# Patient Record
Sex: FEMALE | Race: WHITE | HISPANIC OR LATINO | URBAN - METROPOLITAN AREA
[De-identification: names, ages, dates, MRNs, and addresses within clinical notes are randomized per-mention and may not be internally consistent; named-entity substitution may affect disease eponyms.]

---

## 2021-08-11 ENCOUNTER — EMERGENCY (EMERGENCY)
Facility: HOSPITAL | Age: 21
LOS: 1 days | Discharge: HOME | End: 2021-08-11
Attending: EMERGENCY MEDICINE
Payer: MEDICAID

## 2021-08-11 VITALS
WEIGHT: 175.93 LBS | DIASTOLIC BLOOD PRESSURE: 87 MMHG | OXYGEN SATURATION: 97 % | HEART RATE: 94 BPM | HEIGHT: 65 IN | SYSTOLIC BLOOD PRESSURE: 142 MMHG | RESPIRATION RATE: 18 BRPM | TEMPERATURE: 99 F

## 2021-08-11 PROCEDURE — 99284 EMERGENCY DEPT VISIT MOD MDM: CPT

## 2021-08-12 LAB
HPIV3 RNA SPEC QL NAA+PROBE: DETECTED
RAPID RVP RESULT: DETECTED
SARS-COV-2 RNA SPEC QL NAA+PROBE: SIGNIFICANT CHANGE UP

## 2021-08-12 PROCEDURE — 94640 AIRWAY INHALATION TREATMENT: CPT

## 2021-08-12 PROCEDURE — 99283 EMERGENCY DEPT VISIT LOW MDM: CPT

## 2021-08-12 PROCEDURE — 0225U NFCT DS DNA&RNA 21 SARSCOV2: CPT

## 2021-08-12 RX ORDER — ALBUTEROL 90 UG/1
2 AEROSOL, METERED ORAL EVERY 6 HOURS
Refills: 0 | Status: DISCONTINUED | OUTPATIENT
Start: 2021-08-12 | End: 2021-08-16

## 2021-08-12 RX ORDER — IBUPROFEN 200 MG
600 TABLET ORAL ONCE
Refills: 0 | Status: COMPLETED | OUTPATIENT
Start: 2021-08-12 | End: 2021-08-12

## 2021-08-12 RX ORDER — FLUTICASONE PROPIONATE 50 MCG
1 SPRAY, SUSPENSION NASAL
Refills: 0 | Status: DISCONTINUED | OUTPATIENT
Start: 2021-08-12 | End: 2021-08-16

## 2021-08-12 RX ORDER — ACETAMINOPHEN 500 MG
650 TABLET ORAL ONCE
Refills: 0 | Status: COMPLETED | OUTPATIENT
Start: 2021-08-12 | End: 2021-08-12

## 2021-08-12 RX ADMIN — ALBUTEROL 2 PUFF(S): 90 AEROSOL, METERED ORAL at 00:53

## 2021-08-12 RX ADMIN — Medication 1 SPRAY(S): at 00:52

## 2021-08-12 RX ADMIN — Medication 650 MILLIGRAM(S): at 00:52

## 2021-08-12 RX ADMIN — Medication 600 MILLIGRAM(S): at 00:52

## 2021-08-12 NOTE — ED PROVIDER NOTE - PROGRESS NOTE DETAILS
CHANDRA Cannon NOTE: Pt feeling better, headache resolved, advised self quarantine until results available of COVID test. Pt stable for d/c, reports improvement, VSS, tolerating PO, ambulatory.  Discussion includes results, plan, and return precautions. Pt advised to f/u with PMD 1-2 days. Pt verbalized understanding/agreement of plan.

## 2021-08-12 NOTE — ED PROVIDER NOTE - OBJECTIVE STATEMENT
22 y/o F with no PMHx presents to ED reporting runny nose, sneezing, mild frontal headache, dry cough, subjective fever and chills over past 3 days. No known exposure to COVID. Took no analgesics prior to arrival. Denies pregnancy.

## 2021-08-12 NOTE — ED PROVIDER NOTE - NSFOLLOWUPINSTRUCTIONS_ED_ALL_ED_FT
READ ALL ATTACHED INSTRUCTIONS FOR CORONAVIRUS IMMEDIATELY   - You were tested for COVID-19 (Coronavirus) during your visit to Emergency Department.   - Do not return to work/school/public areas/public transit until you have tested negative for COVID-19.  - Results will be available in 1-2 days. Self quarantine until COVID-19 results available.  - Monitor your symptoms using symptom tracker.  - Practice social distancing and avoid contact with others. Avoid sharing personal household items.   - Practice proper hand hygiene. Disinfect surfaces frequently. Cover your coughs and sneezes.   - Stay hydrated.    - Your blood pressure reading was high while in ED, please monitor your blood pressure at home and follow up with PMD.     SEEK IMMEDIATE MEDICAL CARE IF YOU HAVE ANY OF THE FOLLOWING SYMPTOMS  **Seek immediate medicate attention if you develop new/worsening, signs/symptoms or concerns including, but not limited to shortness of breath, difficulty breathing, chest pain, confusion, severe weakness.**    If you believe you are experiencing a medical emergency call 9-1-1.       Viral Syndrome    WHAT YOU NEED TO KNOW:    Viral syndrome is a term used for symptoms of an infection caused by a virus. Viruses are spread easily from person to person through the air and on shared items.    DISCHARGE INSTRUCTIONS:    Call your local emergency number (911 in the [place]) or have someone else call if:   •You have a seizure.      •You cannot be woken.      •You have chest pain or trouble breathing.      Return to the emergency department if:   •You have a stiff neck, a bad headache, and sensitivity to light.      •You feel weak, dizzy, or confused.      •You stop urinating or urinate a lot less than usual.      •You cough up blood or thick yellow or green mucus.      •You have severe abdominal pain or your abdomen is larger than usual.      Call your doctor if:   •Your symptoms do not get better with treatment or get worse after 3 days.      •You have a rash or ear pain.      •You have burning when you urinate.      •You have questions or concerns about your condition or care.      Medicines: You may need any of the following:   •Acetaminophen decreases pain and fever. It is available without a doctor's order. Ask how much to take and how often to take it. Follow directions. Read the labels of all other medicines you are using to see if they also contain acetaminophen, or ask your doctor or pharmacist. Acetaminophen can cause liver damage if not taken correctly. Do not use more than 4 grams (4,000 milligrams) total of acetaminophen in one day.       •NSAIDs, such as ibuprofen, help decrease swelling, pain, and fever. NSAIDs can cause stomach bleeding or kidney problems in certain people. If you take blood thinner medicine, always ask your healthcare provider if NSAIDs are safe for you. Always read the medicine label and follow directions.      •Cold medicine helps decrease swelling, control a cough, and relieve chest or nasal congestion.       •Saline nasal spray helps decrease nasal congestion.       •Take your medicine as directed. Contact your healthcare provider if you think your medicine is not helping or if you have side effects. Tell him of her if you are allergic to any medicine. Keep a list of the medicines, vitamins, and herbs you take. Include the amounts, and when and why you take them. Bring the list or the pill bottles to follow-up visits. Carry your medicine list with you in case of an emergency.      Manage your symptoms:   •Drink liquids as directed to prevent dehydration. Ask how much liquid to drink each day and which liquids are best for you. Ask if you should drink an oral rehydration solution (ORS). An ORS has the right amounts of water, salts, and sugar you need to replace body fluids. This may help prevent dehydration caused by vomiting or diarrhea. Do not drink liquids with caffeine. Liquids with caffeine can make dehydration worse.      •Get plenty of rest to help your body heal. Take naps throughout the day. Ask your healthcare provider when you can return to work and your normal activities.      •Use a cool mist humidifier to help you breathe easier. Ask your healthcare provider how to use a cool mist humidifier.      •Eat honey or use cough drops for a sore throat. Cough drops are available without a doctor's order. Follow directions for taking cough drops.      •Do not smoke or be close to anyone who is smoking. Nicotine and other chemicals in cigarettes and cigars can cause lung damage. Smoking can also delay healing. Ask your healthcare provider for information if you currently smoke and need help to quit. E-cigarettes or smokeless tobacco still contain nicotine. Talk to your healthcare provider before you use these products.      Prevent the spread of germs:          •Wash your hands often. Wash your hands several times each day. Wash after you use the bathroom, change a child's diaper, and before you prepare or eat food. Use soap and water every time. Rub your soapy hands together, lacing your fingers. Wash the front and back of your hands, and in between your fingers. Use the fingers of one hand to scrub under the fingernails of the other hand. Wash for at least 20 seconds. Rinse with warm, running water for several seconds. Then dry your hands with a clean towel or paper towel. Use hand  that contains alcohol if soap and water are not available. Do not touch your eyes, nose, or mouth without washing your hands first.  Handwashing           •Cover a sneeze or cough. Use a tissue that covers your mouth and nose. Throw the tissue away in a trash can right away. Use the bend of your arm if a tissue is not available. Wash your hands well with soap and water or use a hand .      •Stay away from others while you are sick. Avoid crowds as much as possible.      •Ask about vaccines you may need. Talk to your healthcare provider about your vaccine history. He or she will tell you which vaccines you need, and when to get them.?Get the influenza (flu) vaccine as soon as recommended each year. The flu vaccine is available starting in September or October. Flu viruses change, so it is important to get a flu vaccine every year.      ?Get the pneumonia vaccine if recommended. This vaccine is usually recommended every 5 years. Your provider will tell you when to get this vaccine, if needed.        Follow up with your doctor as directed: Write down your questions so you remember to ask them during your visits.

## 2021-08-12 NOTE — ED PROVIDER NOTE - ATTENDING CONTRIBUTION TO CARE
Britta: I performed a face to face bedside interview with patient regarding history of present illness, review of symptoms and past medical history. I completed an independent physical exam.  I have discussed patient's plan of care with advanced care provider.   I agree with note as stated above including HISTORY OF PRESENT ILLNESS, HIV, PAST MEDICAL/SURGICAL/FAMILY/SOCIAL HISTORY, ALLERGIES AND HOME MEDICATIONS, REVIEW OF SYSTEMS, PHYSICAL EXAM, MEDICAL DECISION MAKING and any PROGRESS NOTES during the time I functioned as the attending physician for this patient  unless otherwise noted. My brief assessment is as follows: 21F no PMH p/w nasal congestion, mild frontal headache, dry cough, subjective fever and chills over past 3 days. No known exposure to COVID. Plan for symptom control, RVP/covid swab, reassess

## 2021-08-12 NOTE — ED PROVIDER NOTE - PATIENT PORTAL LINK FT
You can access the FollowMyHealth Patient Portal offered by [facility] by registering at the following website: [email or web address]. By joining [facility]’s FollowMyHealth portal, you will also be able to view your health information using other applications (apps) compatible with our system.

## 2021-08-12 NOTE — ED PROVIDER NOTE - CLINICAL SUMMARY MEDICAL DECISION MAKING FREE TEXT BOX
20 y/o F with no PMHx presents to ED reporting runny nose, sneezing, mild frontal headache, dry cough, subjective fever and chills over past 3 days. No known exposure to COVID.   -Will give antipyretic, flonase, albuterol inhaler and check COVID testing

## 2021-08-12 NOTE — ED PROVIDER NOTE - PHYSICAL EXAMINATION
Vital signs noted, see flowsheet.  General: NAD, well appearing and non-toxic.  HEENT: NC/AT. MMM. + Nasal discharge, throat clear.  Conjunctiva and sclera clear b/l.  EOMI. PERRL.  Neck: Soft and supple, full ROM without pain.  Cardiac: RRR. +S1/S2. Peripheral pulses 2+ and symmetric b/l. No LE edema.  Respiratory: Speaking in full sentences, no evidence of respiratory distress. Lungs CTA b/l, no wheezes/rhonchi/rales/stridor.   Abdomen: Normoactive bowel sounds x 4 quadrants. Soft, NTND. No guarding or rebound tenderness. No suprapubic tenderness.  Back: Spine midline and non-tender. No CVAT.  Skin: Normal color for race, no evidence of rash, ecchymosis, cyanosis or jaundice.   Neuro: Awake, alert and oriented to person/place/time/situation. Moves all extremities spontaneously and symmetrically.  No focal deficits  Psych: Normal affect

## 2021-10-11 ENCOUNTER — APPOINTMENT (OUTPATIENT)
Dept: OBGYN | Facility: CLINIC | Age: 21
End: 2021-10-11
Payer: MEDICAID

## 2021-10-11 ENCOUNTER — RESULT CHARGE (OUTPATIENT)
Age: 21
End: 2021-10-11

## 2021-10-11 VITALS
DIASTOLIC BLOOD PRESSURE: 70 MMHG | WEIGHT: 177 LBS | BODY MASS INDEX: 29.49 KG/M2 | SYSTOLIC BLOOD PRESSURE: 134 MMHG | HEIGHT: 65 IN

## 2021-10-11 DIAGNOSIS — Z78.9 OTHER SPECIFIED HEALTH STATUS: ICD-10-CM

## 2021-10-11 DIAGNOSIS — R87.619 UNSPECIFIED ABNORMAL CYTOLOGICAL FINDINGS IN SPECIMENS FROM CERVIX UTERI: ICD-10-CM

## 2021-10-11 DIAGNOSIS — Z80.42 FAMILY HISTORY OF MALIGNANT NEOPLASM OF PROSTATE: ICD-10-CM

## 2021-10-11 DIAGNOSIS — Z83.3 FAMILY HISTORY OF DIABETES MELLITUS: ICD-10-CM

## 2021-10-11 PROBLEM — Z00.00 ENCOUNTER FOR PREVENTIVE HEALTH EXAMINATION: Status: ACTIVE | Noted: 2021-10-11

## 2021-10-11 LAB
HCG UR QL: NEGATIVE
QUALITY CONTROL: YES

## 2021-10-11 PROCEDURE — 81025 URINE PREGNANCY TEST: CPT

## 2021-10-11 PROCEDURE — 99202 OFFICE O/P NEW SF 15 MIN: CPT

## 2021-10-11 NOTE — HISTORY OF PRESENT ILLNESS
[N] : Patient denies prior pregnancies [Currently Active] : currently active [Men] : men [Y] : Patient uses contraception [Oral Contraceptive] : uses oral contraception pills [Menarche Age: ____] : age at menarche was [unfilled] [Yes] : Yes [TextBox_4] : New pt presents for a f/u of abnormal pap done at PCP [PapSmeardate] : 04/24/2021 [TextBox_31] : LSIL [LMPDate] : 09/27/2021 [FreeTextEntry1] : pain with sex [FreeTextEntry3] : OCP

## 2021-10-11 NOTE — DISCUSSION/SUMMARY
[FreeTextEntry1] : \par Reviewed patient pap results with her.  Pap results LSIL. As per ASCCP guidelines, recommendation for pap to be repeated in one year. She needs to return in 6 months for her gyn annual and pap follow-up. \par \par Patient appreciative, concerns and questions addressed. Patient verbalized understanding.\par \par RTO in 6 months for gyn annual and prn.

## 2021-10-25 ENCOUNTER — TRANSCRIPTION ENCOUNTER (OUTPATIENT)
Age: 21
End: 2021-10-25

## 2022-04-27 ENCOUNTER — APPOINTMENT (OUTPATIENT)
Dept: OBGYN | Facility: CLINIC | Age: 22
End: 2022-04-27

## 2022-05-02 ENCOUNTER — APPOINTMENT (OUTPATIENT)
Dept: OBGYN | Facility: CLINIC | Age: 22
End: 2022-05-02
Payer: MEDICAID

## 2022-05-02 VITALS
DIASTOLIC BLOOD PRESSURE: 72 MMHG | WEIGHT: 180.8 LBS | HEIGHT: 65 IN | BODY MASS INDEX: 30.12 KG/M2 | SYSTOLIC BLOOD PRESSURE: 122 MMHG

## 2022-05-02 DIAGNOSIS — Z01.419 ENCOUNTER FOR GYNECOLOGICAL EXAMINATION (GENERAL) (ROUTINE) W/OUT ABNORMAL FINDINGS: ICD-10-CM

## 2022-05-02 PROCEDURE — 99395 PREV VISIT EST AGE 18-39: CPT

## 2022-05-02 NOTE — PHYSICAL EXAM
[Chaperone Present] : A chaperone was present in the examining room during all aspects of the physical examination [Appropriately responsive] : appropriately responsive [Alert] : alert [No Acute Distress] : no acute distress [No Lymphadenopathy] : no lymphadenopathy [Soft] : soft [Non-tender] : non-tender [Non-distended] : non-distended [Oriented x3] : oriented x3 [Examination Of The Breasts] : a normal appearance [No Discharge] : no discharge [No Masses] : no breast masses were palpable [Labia Majora] : normal [Labia Minora] : normal [No Bleeding] : There was no active vaginal bleeding [Normal] : normal [Uterine Adnexae] : non-palpable [FreeTextEntry1] : amy

## 2022-05-02 NOTE — HISTORY OF PRESENT ILLNESS
[Oral Contraceptive] : uses oral contraception pills [Y] : Patient is sexually active [N] : Patient denies prior pregnancies [No] : Patient does not have concerns regarding sex [Currently Active] : currently active [Patient refuses STI testing] : Patient refuses STI testing [LMPDate] : 04/05/22 [PGHxTotal] : 0 [FreeTextEntry1] : 04/05/22

## 2022-05-02 NOTE — DISCUSSION/SUMMARY
[FreeTextEntry1] :   The benefits of adequate calcium intake and a daily multivitamin along with routine daily cardiovascular exercise were reviewed with the patient.\par   The patient was recommended to have pelvic sono for complaints of pain. \par   The importance of safer-sex was discussed with the patient.\par We reviewed ASCCP/ACOG guidelines for pap smears.

## 2022-05-03 LAB
C TRACH RRNA SPEC QL NAA+PROBE: NOT DETECTED
N GONORRHOEA RRNA SPEC QL NAA+PROBE: NOT DETECTED
SOURCE TP AMPLIFICATION: NORMAL

## 2022-05-09 ENCOUNTER — NON-APPOINTMENT (OUTPATIENT)
Age: 22
End: 2022-05-09

## 2022-05-12 LAB — CYTOLOGY CVX/VAG DOC THIN PREP: ABNORMAL

## 2022-06-07 ENCOUNTER — APPOINTMENT (OUTPATIENT)
Dept: ANTEPARTUM | Facility: CLINIC | Age: 22
End: 2022-06-07
Payer: MEDICAID

## 2022-06-07 ENCOUNTER — ASOB RESULT (OUTPATIENT)
Age: 22
End: 2022-06-07

## 2022-06-07 ENCOUNTER — APPOINTMENT (OUTPATIENT)
Dept: OBGYN | Facility: CLINIC | Age: 22
End: 2022-06-07
Payer: MEDICAID

## 2022-06-07 VITALS
BODY MASS INDEX: 29.99 KG/M2 | HEIGHT: 65 IN | SYSTOLIC BLOOD PRESSURE: 128 MMHG | WEIGHT: 180 LBS | DIASTOLIC BLOOD PRESSURE: 84 MMHG

## 2022-06-07 DIAGNOSIS — R10.2 PELVIC AND PERINEAL PAIN: ICD-10-CM

## 2022-06-07 PROCEDURE — 99213 OFFICE O/P EST LOW 20 MIN: CPT

## 2022-06-07 PROCEDURE — 76830 TRANSVAGINAL US NON-OB: CPT

## 2022-06-09 PROBLEM — R10.2 PELVIC PAIN IN FEMALE: Status: ACTIVE | Noted: 2022-05-02

## 2022-06-09 NOTE — HISTORY OF PRESENT ILLNESS
[FreeTextEntry1] : Rosalia presents for follow of pelvic pain, which she reported at her annual exam. She has been experiencing intermittent pelvic pain, dull, bilateral, for over 1 year. She also reports that she suffers from constipation. \par Sonogram today is showing 3.5cm uterus with 3mm lining, no ovarian or adnexal masses. \par

## 2022-06-09 NOTE — DISCUSSION/SUMMARY
[FreeTextEntry1] : We reviewed normal pelvic sonogram. I explained that pelvic pain can be caused by GYN, urinary, MSK, or GI origins. Given the fact that she also suffers from constipation, I advised avoidance of constipation to see if this will help with her symptoms. Daily probiotics recommended. I also advised use of miralax twice weekly as needed. \par RTO for annual exam or as needed.

## 2022-06-28 ENCOUNTER — NON-APPOINTMENT (OUTPATIENT)
Age: 22
End: 2022-06-28

## 2022-06-28 ENCOUNTER — EMERGENCY (EMERGENCY)
Facility: HOSPITAL | Age: 22
LOS: 1 days | Discharge: HOME | End: 2022-06-28
Attending: EMERGENCY MEDICINE
Payer: MEDICAID

## 2022-06-28 VITALS
DIASTOLIC BLOOD PRESSURE: 98 MMHG | WEIGHT: 179.9 LBS | OXYGEN SATURATION: 98 % | HEART RATE: 112 BPM | TEMPERATURE: 99 F | HEIGHT: 65 IN | SYSTOLIC BLOOD PRESSURE: 155 MMHG | RESPIRATION RATE: 20 BRPM

## 2022-06-28 VITALS — SYSTOLIC BLOOD PRESSURE: 154 MMHG | OXYGEN SATURATION: 98 % | HEART RATE: 96 BPM | DIASTOLIC BLOOD PRESSURE: 96 MMHG

## 2022-06-28 LAB
ALBUMIN SERPL ELPH-MCNC: 4 G/DL — SIGNIFICANT CHANGE UP (ref 3.3–5.2)
ALP SERPL-CCNC: 120 U/L — SIGNIFICANT CHANGE UP (ref 40–120)
ALT FLD-CCNC: 24 U/L — SIGNIFICANT CHANGE UP
ANION GAP SERPL CALC-SCNC: 13 MMOL/L — SIGNIFICANT CHANGE UP (ref 5–17)
AST SERPL-CCNC: 29 U/L — SIGNIFICANT CHANGE UP
BASOPHILS # BLD AUTO: 0.04 K/UL — SIGNIFICANT CHANGE UP (ref 0–0.2)
BASOPHILS NFR BLD AUTO: 0.5 % — SIGNIFICANT CHANGE UP (ref 0–2)
BILIRUB SERPL-MCNC: 0.2 MG/DL — LOW (ref 0.4–2)
BUN SERPL-MCNC: 8.4 MG/DL — SIGNIFICANT CHANGE UP (ref 8–20)
CALCIUM SERPL-MCNC: 8.7 MG/DL — SIGNIFICANT CHANGE UP (ref 8.6–10.2)
CHLORIDE SERPL-SCNC: 105 MMOL/L — SIGNIFICANT CHANGE UP (ref 98–107)
CO2 SERPL-SCNC: 20 MMOL/L — LOW (ref 22–29)
CREAT SERPL-MCNC: 0.46 MG/DL — LOW (ref 0.5–1.3)
D DIMER BLD IA.RAPID-MCNC: <150 NG/ML DDU — SIGNIFICANT CHANGE UP
EGFR: 137 ML/MIN/1.73M2 — SIGNIFICANT CHANGE UP
EOSINOPHIL # BLD AUTO: 0.18 K/UL — SIGNIFICANT CHANGE UP (ref 0–0.5)
EOSINOPHIL NFR BLD AUTO: 2.1 % — SIGNIFICANT CHANGE UP (ref 0–6)
GLUCOSE SERPL-MCNC: 157 MG/DL — HIGH (ref 70–99)
HCG SERPL-ACNC: <4 MIU/ML — SIGNIFICANT CHANGE UP
HCT VFR BLD CALC: 41.3 % — SIGNIFICANT CHANGE UP (ref 34.5–45)
HGB BLD-MCNC: 14 G/DL — SIGNIFICANT CHANGE UP (ref 11.5–15.5)
IMM GRANULOCYTES NFR BLD AUTO: 0.2 % — SIGNIFICANT CHANGE UP (ref 0–1.5)
LYMPHOCYTES # BLD AUTO: 2.49 K/UL — SIGNIFICANT CHANGE UP (ref 1–3.3)
LYMPHOCYTES # BLD AUTO: 29.6 % — SIGNIFICANT CHANGE UP (ref 13–44)
MCHC RBC-ENTMCNC: 29.2 PG — SIGNIFICANT CHANGE UP (ref 27–34)
MCHC RBC-ENTMCNC: 33.9 GM/DL — SIGNIFICANT CHANGE UP (ref 32–36)
MCV RBC AUTO: 86.2 FL — SIGNIFICANT CHANGE UP (ref 80–100)
MONOCYTES # BLD AUTO: 0.65 K/UL — SIGNIFICANT CHANGE UP (ref 0–0.9)
MONOCYTES NFR BLD AUTO: 7.7 % — SIGNIFICANT CHANGE UP (ref 2–14)
NEUTROPHILS # BLD AUTO: 5.02 K/UL — SIGNIFICANT CHANGE UP (ref 1.8–7.4)
NEUTROPHILS NFR BLD AUTO: 59.9 % — SIGNIFICANT CHANGE UP (ref 43–77)
PLATELET # BLD AUTO: 270 K/UL — SIGNIFICANT CHANGE UP (ref 150–400)
POTASSIUM SERPL-MCNC: 3.4 MMOL/L — LOW (ref 3.5–5.3)
POTASSIUM SERPL-SCNC: 3.4 MMOL/L — LOW (ref 3.5–5.3)
PROT SERPL-MCNC: 6.7 G/DL — SIGNIFICANT CHANGE UP (ref 6.6–8.7)
RAPID RVP RESULT: SIGNIFICANT CHANGE UP
RBC # BLD: 4.79 M/UL — SIGNIFICANT CHANGE UP (ref 3.8–5.2)
RBC # FLD: 12 % — SIGNIFICANT CHANGE UP (ref 10.3–14.5)
SARS-COV-2 RNA SPEC QL NAA+PROBE: SIGNIFICANT CHANGE UP
SODIUM SERPL-SCNC: 138 MMOL/L — SIGNIFICANT CHANGE UP (ref 135–145)
TSH SERPL-MCNC: 0.76 UIU/ML — SIGNIFICANT CHANGE UP (ref 0.27–4.2)
WBC # BLD: 8.4 K/UL — SIGNIFICANT CHANGE UP (ref 3.8–10.5)
WBC # FLD AUTO: 8.4 K/UL — SIGNIFICANT CHANGE UP (ref 3.8–10.5)

## 2022-06-28 PROCEDURE — 93010 ELECTROCARDIOGRAM REPORT: CPT

## 2022-06-28 PROCEDURE — 36415 COLL VENOUS BLD VENIPUNCTURE: CPT

## 2022-06-28 PROCEDURE — 85025 COMPLETE CBC W/AUTO DIFF WBC: CPT

## 2022-06-28 PROCEDURE — 99285 EMERGENCY DEPT VISIT HI MDM: CPT | Mod: 25

## 2022-06-28 PROCEDURE — 80053 COMPREHEN METABOLIC PANEL: CPT

## 2022-06-28 PROCEDURE — 84702 CHORIONIC GONADOTROPIN TEST: CPT

## 2022-06-28 PROCEDURE — 71046 X-RAY EXAM CHEST 2 VIEWS: CPT

## 2022-06-28 PROCEDURE — 0225U NFCT DS DNA&RNA 21 SARSCOV2: CPT

## 2022-06-28 PROCEDURE — 85379 FIBRIN DEGRADATION QUANT: CPT

## 2022-06-28 PROCEDURE — 84443 ASSAY THYROID STIM HORMONE: CPT

## 2022-06-28 PROCEDURE — 71046 X-RAY EXAM CHEST 2 VIEWS: CPT | Mod: 26

## 2022-06-28 PROCEDURE — 93005 ELECTROCARDIOGRAM TRACING: CPT

## 2022-06-28 PROCEDURE — 99285 EMERGENCY DEPT VISIT HI MDM: CPT

## 2022-06-28 NOTE — ED PROVIDER NOTE - PATIENT PORTAL LINK FT
You can access the FollowMyHealth Patient Portal offered by [facility] by registering at the following website: [email or web address]. By joining [facility]’s FollowMyHealth portal, you will also be able to view your health information using other applications (apps) compatible with our system.

## 2022-06-28 NOTE — ED ADULT NURSE NOTE - SUICIDE SCREENING QUESTION 2
August 7, 2018     Patient: Jarek Amaya   YOB: 1964   Date of Visit: 8/7/2018       To Whom it May Concern:    Vladimir Remy is under my professional care  She was seen in my office on 8/7/2018  Please excuse her from work on August 3, 2018  If you have any questions or concerns, please don't hesitate to call           Sincerely,          Tanesha Gaspar MD        CC: No Recipients
No

## 2022-06-28 NOTE — ED PROVIDER NOTE - PROGRESS NOTE DETAILS
labs and imaging reviewed incidental elevated glucose, recommend outpatient f/u with PCP.  Given copies of all results, understands and agrees to proceed.

## 2022-06-28 NOTE — ED ADULT NURSE NOTE - OBJECTIVE STATEMENT
Pt with c/o palpitations, sob x 1 days. Pt states she has been stressed lately. Pt denies N/V/D, fevers, CP.

## 2022-06-28 NOTE — ED PROVIDER NOTE - ATTENDING APP SHARED VISIT CONTRIBUTION OF CARE
Hayden BONNER- 23 Y/O F with no known medical problems p/w sudden onset fo chest pain and sob lasting 10 minutes whole sitting at home this afternoon. Pt states that she is stressed about not getting into university. No nausea, vomiting, diarrhea. pt on ocp, non smoker. Pt had physical this morning and did not eat and drink well whole day. ED  Honey    Pt is alert, well appearing female, s1s2 mild tachy reg, b/l clear breath sounds, abd soft, nt, nd, neuro exam aox3, cn 2-12 intact, no focal deficits, skin warm, dry, good turgor    ekg showed sinus tachy, plan to check labs, r/o acs cxr, r/o pe with d- dimer and reassess

## 2022-06-28 NOTE — ED PROVIDER NOTE - OBJECTIVE STATEMENT
This is a 22 year old female BIBA here for palpitations and feeling like she cant take a full breath x 1 day.  She notes no pmhx or shx.  She reports LMP last week, denies possibility of pregnancy.  She notes no fevers, chills, n/v/d or any sick contacts, recent travel or rashes.  Of note patient is tearful expressing stress at home hear her sister in MVA last week and reports triggered her anxiety because her older sister  of MVA.

## 2022-06-28 NOTE — ED PROVIDER NOTE - CARE PROVIDER_API CALL
Erlin Cheek)  Cardiovascular Disease  [address] [facility], [place] [address]  Phone: [phone]  Fax: [phone]  Follow Up Time:

## 2022-06-28 NOTE — ED ADULT TRIAGE NOTE - CHIEF COMPLAINT QUOTE
Pt c/o palpitations.  Pt reports accompanying flu-like symptoms x 1 week.  Tested negative for flu and covid.

## 2022-06-28 NOTE — ED PROVIDER NOTE - NS ED ATTENDING STATEMENT MOD
This was a shared visit with the MADHU. I reviewed and verified the documentation and independently performed the documented:

## 2022-07-08 ENCOUNTER — APPOINTMENT (OUTPATIENT)
Dept: CARDIOLOGY | Facility: CLINIC | Age: 22
End: 2022-07-08

## 2022-07-08 ENCOUNTER — NON-APPOINTMENT (OUTPATIENT)
Age: 22
End: 2022-07-08

## 2022-07-08 VITALS
DIASTOLIC BLOOD PRESSURE: 76 MMHG | HEART RATE: 99 BPM | SYSTOLIC BLOOD PRESSURE: 122 MMHG | BODY MASS INDEX: 29.36 KG/M2 | HEIGHT: 65 IN | WEIGHT: 176.2 LBS | OXYGEN SATURATION: 97 %

## 2022-07-08 VITALS — HEART RATE: 99 BPM | OXYGEN SATURATION: 97 %

## 2022-07-08 PROCEDURE — 99204 OFFICE O/P NEW MOD 45 MIN: CPT

## 2022-07-08 PROCEDURE — 93000 ELECTROCARDIOGRAM COMPLETE: CPT | Mod: 59

## 2022-07-08 PROCEDURE — 93242 EXT ECG>48HR<7D RECORDING: CPT

## 2022-07-12 NOTE — REASON FOR VISIT
[Symptom and Test Evaluation] : symptom and test evaluation [FreeTextEntry1] : 21 y/o F with no PMH presents for evaluation of palpitations, chest tightness and shortness of breath \par \par Patient notes that she was lying in bed last week and had palpitations + shortness of breath \par and then next day notes that this occurred again similar symptoms with associated chest pain pressure in chest when shes states that sometimes it goes down the arm \par No dizziness or lightheadedness and this only occurred last week \par No swelling in the legs \par no orthopnea or PND\par \par smoke: none\par alcohol occasionally \par drink coffee : used to drink every day \par family hx: father dm2 cancer of the prostate

## 2022-07-12 NOTE — ASSESSMENT
[FreeTextEntry1] : 21 y/o F with no PMH presents for evaluation of palpitations, chest tightness and shortness of breath \par \par 1) Chest pressure with shortness of breath and palpitations\par - patient notes to be having recurrent symptoms of palpitation, chest pressure and shortness of breath \par - EKG reviewed and shows 7/8/2022: Sinus Rhythm @ 93 BPM WITHIN NORMAL LIMITS\par - will refer for exercise stress test to assess for any recurrent symptoms with exercise resendez treadmill score and ekg changes with exercise \par - refer for TTE to assess LV function and valvulopathy \par - refer for Holter monitor for 3 days \par \par Daisy Garcia D.O., FACC\par Cardiology/Vascular Cardiology -[facility]\par Telephone # [phone]\par \par

## 2022-07-19 ENCOUNTER — APPOINTMENT (OUTPATIENT)
Dept: CARDIOLOGY | Facility: CLINIC | Age: 22
End: 2022-07-19

## 2022-07-19 PROCEDURE — 93306 TTE W/DOPPLER COMPLETE: CPT

## 2022-08-02 ENCOUNTER — APPOINTMENT (OUTPATIENT)
Dept: CARDIOLOGY | Facility: CLINIC | Age: 22
End: 2022-08-02

## 2022-08-02 PROCEDURE — 93015 CV STRESS TEST SUPVJ I&R: CPT

## 2022-08-03 ENCOUNTER — TRANSCRIPTION ENCOUNTER (OUTPATIENT)
Age: 22
End: 2022-08-03

## 2022-10-11 ENCOUNTER — APPOINTMENT (OUTPATIENT)
Dept: CARDIOLOGY | Facility: CLINIC | Age: 22
End: 2022-10-11

## 2022-10-11 VITALS
BODY MASS INDEX: 29.99 KG/M2 | TEMPERATURE: 98.6 F | WEIGHT: 180 LBS | HEART RATE: 112 BPM | SYSTOLIC BLOOD PRESSURE: 118 MMHG | DIASTOLIC BLOOD PRESSURE: 72 MMHG | OXYGEN SATURATION: 98 % | HEIGHT: 65 IN

## 2022-10-11 DIAGNOSIS — R06.02 SHORTNESS OF BREATH: ICD-10-CM

## 2022-10-11 DIAGNOSIS — R07.89 OTHER CHEST PAIN: ICD-10-CM

## 2022-10-11 DIAGNOSIS — R00.2 PALPITATIONS: ICD-10-CM

## 2022-10-11 PROCEDURE — 99213 OFFICE O/P EST LOW 20 MIN: CPT | Mod: 25

## 2022-10-11 PROCEDURE — 93000 ELECTROCARDIOGRAM COMPLETE: CPT

## 2022-10-11 RX ORDER — LEVONORGESTREL AND ETHINYL ESTRADIOL 0.15-0.03
KIT ORAL
Refills: 0 | Status: DISCONTINUED | COMMUNITY
End: 2022-10-11

## 2022-10-25 ENCOUNTER — NON-APPOINTMENT (OUTPATIENT)
Age: 22
End: 2022-10-25

## 2022-10-25 PROBLEM — R07.89 CHEST TIGHTNESS OR PRESSURE: Status: ACTIVE | Noted: 2022-07-08

## 2022-10-25 PROBLEM — R06.02 SHORTNESS OF BREATH: Status: ACTIVE | Noted: 2022-07-08

## 2022-10-25 PROBLEM — R00.2 HEART PALPITATIONS: Status: ACTIVE | Noted: 2022-07-08

## 2022-10-25 NOTE — CARDIOLOGY SUMMARY
[de-identified] :  10/11/2022: Sinus  Rhythm @ 100 bpm WITHIN NORMAL LIMITS\par 7/8/2022: Sinus Rhythm @ 93 BPM WITHIN NORMAL LIMITS\par  [de-identified] : 3 day holter monitor \par Sinus rhythm min HR 74 BPM WITH AVERAGE hr 94 BPM MAX hr 154 BPM \par No SVE/SVT Pause or PVC or VT  [de-identified] : 8/2022: Normal Study; 11 METS with no chest pain or shortness of breath \par No EKG changes with exercise \par Duke treadmill score 10 at low cardiac risk  [de-identified] : 7/20/2022: 1. Technically difficult study.\par 2. Normal global left ventricular systolic function.\par 3. Left ventricular ejection fraction, by visual estimation, is 55 to 60%.\par 4. LV EF by Biplane MOD = 58%.\par 5. Spectral Doppler shows impaired relaxation pattern of left ventricular myocardial filling (Grade I diastolic dysfunction).\par 6. Normal right ventricular size and function.\par 7. There is no evidence of pericardial effusion.\par 8. There are no prior studies on this patient for comparison purposes.

## 2022-10-25 NOTE — REASON FOR VISIT
[Symptom and Test Evaluation] : symptom and test evaluation [FreeTextEntry1] : 23 y/o F with no PMH presents for evaluation of palpitations, chest tightness and shortness of breath, here for follow up of cardiovascular testing \par \par Patient notes that she has been doing well with no recurrence of symptoms with no chest pain or shortness of breath \par \par smoke: none\par alcohol occasionally \par drink coffee : used to drink every day \par family hx: father dm2 cancer of the prostate \par

## 2022-10-25 NOTE — ASSESSMENT
[FreeTextEntry1] : 23 y/o F with no PMH presents for evaluation of palpitations, chest tightness and shortness of breath \par \par 1) Chest pressure with shortness of breath and palpitations\par - patient notes to have no recurrence of symptoms of palpitation, chest pressure and shortness of breath \par - EKG reviewed and shows  10/11/2022: Sinus  Rhythm @ 100 bpm WITHIN NORMAL LIMITS\par - TTE done and shows normal LVEF with no significant valvulopathy\par - Exercise stress test within normal limits with Ortiz treadmill score of  10 at low cardiac risk \par - Holter monitor overall normal with no events \par \par \par \par Daisy Garcia D.O., FACC\par Cardiology/Vascular Cardiology -[facility]\par Telephone # [phone]\par \par . \par

## 2023-02-15 ENCOUNTER — NON-APPOINTMENT (OUTPATIENT)
Age: 23
End: 2023-02-15

## 2023-02-15 ENCOUNTER — APPOINTMENT (OUTPATIENT)
Dept: OBGYN | Facility: CLINIC | Age: 23
End: 2023-02-15
Payer: MEDICAID

## 2023-02-15 VITALS
DIASTOLIC BLOOD PRESSURE: 70 MMHG | SYSTOLIC BLOOD PRESSURE: 122 MMHG | BODY MASS INDEX: 31.46 KG/M2 | WEIGHT: 188.8 LBS | HEIGHT: 65 IN

## 2023-02-15 DIAGNOSIS — N91.1 SECONDARY AMENORRHEA: ICD-10-CM

## 2023-02-15 DIAGNOSIS — N91.5 OLIGOMENORRHEA, UNSPECIFIED: ICD-10-CM

## 2023-02-15 LAB
HCG UR QL: NEGATIVE
QUALITY CONTROL: YES

## 2023-02-15 PROCEDURE — 99213 OFFICE O/P EST LOW 20 MIN: CPT

## 2023-02-15 PROCEDURE — 81025 URINE PREGNANCY TEST: CPT

## 2023-02-15 RX ORDER — MEDROXYPROGESTERONE ACETATE 5 MG/1
5 TABLET ORAL
Qty: 10 | Refills: 3 | Status: ACTIVE | COMMUNITY
Start: 2023-02-15 | End: 1900-01-01

## 2023-02-16 NOTE — HISTORY OF PRESENT ILLNESS
[FreeTextEntry1] : Rosalia presents with amenorrhea since oct when nexplanon was removed due to arm pain. Prior to that LMP was in august. \par She has been using condoms for contraception. \par She has occasional pelvic pain. She reports weight gain. She is unaware of previous diagnosis of PCOS but sonogram in June 2022 showing bilateral polyfollicular ovaries.

## 2023-02-16 NOTE — DISCUSSION/SUMMARY
[FreeTextEntry1] : We reviewed the risks of prolonged amenorrhea and I advised that she use a provera withdrawal in order to give herself a period. Instructions were reviewed and Rx sent to pharmacy. \par In regards to workup, I advised labwork today to evaluate for PCOS and other hormonal causes of amenorrhea. Bloodwork drawn today in office. I will call the patient with the results and we will treat as indicated. \par She is not sure if she desires pregnancy at this time or not. I explained that even though she has irregular menses, she can still conceive spontaneously. She will continue using condoms for the time being while the workup is being performed.

## 2023-02-20 ENCOUNTER — NON-APPOINTMENT (OUTPATIENT)
Age: 23
End: 2023-02-20

## 2023-02-22 LAB
ESTIMATED AVERAGE GLUCOSE: 114 MG/DL
ESTRADIOL SERPL-MCNC: 44 PG/ML
FSH SERPL-MCNC: 7.2 IU/L
HBA1C MFR BLD HPLC: 5.6 %
HCG SERPL-MCNC: <1 MIU/ML
LH SERPL-ACNC: 15.7 IU/L
PROLACTIN SERPL-MCNC: 18.9 NG/ML
TESTOST SERPL-MCNC: 60.1 NG/DL
TSH SERPL-ACNC: 1.49 UIU/ML

## 2023-02-25 LAB — 17OHP SERPL-MCNC: 44 NG/DL

## 2023-03-02 LAB — DHEA-SULFATE, SERUM: 386 UG/DL

## 2023-03-13 ENCOUNTER — ASOB RESULT (OUTPATIENT)
Age: 23
End: 2023-03-13

## 2023-03-13 ENCOUNTER — APPOINTMENT (OUTPATIENT)
Dept: ANTEPARTUM | Facility: CLINIC | Age: 23
End: 2023-03-13
Payer: MEDICAID

## 2023-03-13 ENCOUNTER — APPOINTMENT (OUTPATIENT)
Dept: OBGYN | Facility: CLINIC | Age: 23
End: 2023-03-13
Payer: MEDICAID

## 2023-03-13 VITALS
HEIGHT: 65 IN | DIASTOLIC BLOOD PRESSURE: 70 MMHG | SYSTOLIC BLOOD PRESSURE: 120 MMHG | WEIGHT: 188 LBS | BODY MASS INDEX: 31.32 KG/M2

## 2023-03-13 PROCEDURE — 76830 TRANSVAGINAL US NON-OB: CPT

## 2023-03-13 PROCEDURE — 99213 OFFICE O/P EST LOW 20 MIN: CPT

## 2023-03-13 RX ORDER — LETROZOLE TABLETS 2.5 MG/1
2.5 TABLET, FILM COATED ORAL DAILY
Qty: 10 | Refills: 5 | Status: ACTIVE | COMMUNITY
Start: 2023-03-13 | End: 1900-01-01

## 2023-03-13 NOTE — PHYSICAL EXAM
[Chaperone Present] : A chaperone was present in the examining room during all aspects of the physical examination [FreeTextEntry1] : amy [Appropriately responsive] : appropriately responsive [Alert] : alert [No Acute Distress] : no acute distress [Oriented x3] : oriented x3

## 2023-03-13 NOTE — COUNSELING
[Vitamins/Supplements] : vitamins/supplements [Preconception Care/ Fertility options] : preconception care, fertility options [Lab Results] : lab results [Medication Management] : medication management

## 2023-03-13 NOTE — HISTORY OF PRESENT ILLNESS
[FreeTextEntry1] : Rosalia presents for follow up of prolonged amenorrhea/PCOS/infertility. She desires pregnancy. \par Labwork at last visit showed elevated testosterone, she has an appointment to see endo in June. \par She used provera and had withdrawal bleeding, LMP 3/5. She is no longer bleeding. \par \par Sono today showing 6mm lining and polyfollicular ovaries.

## 2023-03-13 NOTE — DISCUSSION/SUMMARY
[FreeTextEntry1] : We reviewed the sonogram and lab results which are consistent with PCOS. Lifestyle modifications including daily PNV, regular exercise, and weight loss were recommended.\par \par The patient desires ovulation induction using letrozole. We reviewed that letrozole has higher success rate and lower twin rate than clomiphene for patients with PCOS, but there is still a risk of twinning with either medication. \par The mechanism of action was explained and she is aware that she may experiencing mild hot flashes. Using a diagram, we reviewed the normal menstrual cycle and how to count from cycle day 1. Provera withdrawal bleed was given and she was instructed to take 5 mg of letrozole from cycle day 3 to 7. Timed intercourse was recommended every other day from 10-20. If she does not have menses on day 30 she was instructed to take a pregnancy test and call the office. If she gets a period she will count that as day 1 and start the cycle over. Written instructions were provided \par We reviewed that infertility can be multifactorial and that if she does not conceive within 3 cycles of letrozole, we should proceed with semen analysis and HSG. She is aware that she may require referral to [facility] in the future. RTO 3-4 months to check in.

## 2023-05-23 ENCOUNTER — LABORATORY RESULT (OUTPATIENT)
Age: 23
End: 2023-05-23

## 2023-05-23 ENCOUNTER — APPOINTMENT (OUTPATIENT)
Dept: OBGYN | Facility: CLINIC | Age: 23
End: 2023-05-23
Payer: MEDICAID

## 2023-05-23 VITALS
HEIGHT: 65 IN | SYSTOLIC BLOOD PRESSURE: 118 MMHG | BODY MASS INDEX: 30.16 KG/M2 | WEIGHT: 181 LBS | DIASTOLIC BLOOD PRESSURE: 68 MMHG

## 2023-05-23 DIAGNOSIS — Z12.4 ENCOUNTER FOR SCREENING FOR MALIGNANT NEOPLASM OF CERVIX: ICD-10-CM

## 2023-05-23 DIAGNOSIS — Z01.419 ENCOUNTER FOR GYNECOLOGICAL EXAMINATION (GENERAL) (ROUTINE) W/OUT ABNORMAL FINDINGS: ICD-10-CM

## 2023-05-23 DIAGNOSIS — N97.0 FEMALE INFERTILITY ASSOCIATED WITH ANOVULATION: ICD-10-CM

## 2023-05-23 LAB
HCG UR QL: NEGATIVE
QUALITY CONTROL: YES

## 2023-05-23 PROCEDURE — 81025 URINE PREGNANCY TEST: CPT

## 2023-05-23 PROCEDURE — 99395 PREV VISIT EST AGE 18-39: CPT

## 2023-05-24 PROBLEM — N97.0 INFERTILITY ASSOCIATED WITH ANOVULATION: Status: ACTIVE | Noted: 2023-03-13

## 2023-05-24 NOTE — PHYSICAL EXAM
[Chaperone Present] : A chaperone was present in the examining room during all aspects of the physical examination [Appropriately responsive] : appropriately responsive [Alert] : alert [No Acute Distress] : no acute distress [No Lymphadenopathy] : no lymphadenopathy [Soft] : soft [Non-tender] : non-tender [Non-distended] : non-distended [Oriented x3] : oriented x3 [Examination Of The Breasts] : a normal appearance [No Discharge] : no discharge [No Masses] : no breast masses were palpable [Labia Majora] : normal [Labia Minora] : normal [Normal] : normal [Uterine Adnexae] : non-palpable [FreeTextEntry1] : amy

## 2023-05-24 NOTE — DISCUSSION/SUMMARY
[FreeTextEntry1] :   The benefits of adequate calcium intake and a daily multivitamin along with routine daily cardiovascular exercise were reviewed with the patient.\par We discussed that letrozole needs to be 5mg per day on cycle day 3-7. \par   The importance of safer-sex was discussed with the patient.\par We reviewed ASCCP/ACOG guidelines for pap smears. \par RTO 3 months for infertility follow up.

## 2023-05-24 NOTE — HISTORY OF PRESENT ILLNESS
[No] : Patient does not have concerns regarding sex [Currently Active] : currently active [Patient refuses STI testing] : Patient refuses STI testing [FreeTextEntry1] : Rosalia is a  LMP 3/29/23 who presents for annual exam. She is without complaints. Denies pelvic pain, abnormal bleeding, or vaginal discharge. Denies issues with bowel or bladder function. \par PCOS, took letrozole but only 2.5mg and did not ovulate or get menses. Last period occurred after provera. \par She is sexually active with male partner (s). She desires pregnancy. Denies pain with intercourse. She is sexually satisfied. \par Lives with family. Works as customer service. Feels safe at home. Denies depression/abuse. \par Immunizations: unsure if received gardasil\par

## 2023-06-02 LAB
CYTOLOGY CVX/VAG DOC THIN PREP: ABNORMAL
HPV HIGH+LOW RISK DNA PNL CVX: DETECTED

## 2023-06-12 ENCOUNTER — APPOINTMENT (OUTPATIENT)
Dept: OBGYN | Facility: CLINIC | Age: 23
End: 2023-06-12
Payer: MEDICAID

## 2023-06-12 VITALS
WEIGHT: 180 LBS | SYSTOLIC BLOOD PRESSURE: 128 MMHG | BODY MASS INDEX: 29.99 KG/M2 | HEIGHT: 65 IN | DIASTOLIC BLOOD PRESSURE: 74 MMHG

## 2023-06-12 DIAGNOSIS — R87.612 LOW GRADE SQUAMOUS INTRAEPITHELIAL LESION ON CYTOLOGIC SMEAR OF CERVIX (LGSIL): ICD-10-CM

## 2023-06-12 DIAGNOSIS — Z32.02 ENCOUNTER FOR PREGNANCY TEST, RESULT NEGATIVE: ICD-10-CM

## 2023-06-12 LAB
HCG UR QL: NEGATIVE
QUALITY CONTROL: YES

## 2023-06-12 PROCEDURE — 81025 URINE PREGNANCY TEST: CPT

## 2023-06-12 PROCEDURE — 57454 BX/CURETT OF CERVIX W/SCOPE: CPT

## 2023-06-12 NOTE — PROCEDURE
[Colposcopy] : Colposcopy  [Consent Obtained] : Consent obtained [Risks] : risks [Benefits] : benefits [Alternatives] : alternatives [Patient] : patient [LGSIL] : LGSIL [HPV High Risk] : HPV high risk [No Premedication] : no premedication [Colposcopy Adequate] : colposcopy adequate [SCI Fully Visualized] : SCI fully visualized [ECC Performed] : ECC performed [Lesion] : lesion seen [Biopsy] : biopsy taken [Hemostasis Obtained] : Hemostasis obtained [Tolerated Well] : the patient tolerated the procedure well [de-identified] : 1 [de-identified] : diffuse acetowhite [de-identified] : cervix 11 o clock [de-identified] : jax wilder [de-identified] : pressure, monsels [de-identified] : low grade\par i will call patient with results

## 2023-06-21 ENCOUNTER — APPOINTMENT (OUTPATIENT)
Dept: ENDOCRINOLOGY | Facility: CLINIC | Age: 23
End: 2023-06-21
Payer: MEDICAID

## 2023-06-21 VITALS
DIASTOLIC BLOOD PRESSURE: 78 MMHG | HEIGHT: 65 IN | WEIGHT: 180 LBS | BODY MASS INDEX: 29.99 KG/M2 | OXYGEN SATURATION: 95 % | HEART RATE: 94 BPM | SYSTOLIC BLOOD PRESSURE: 118 MMHG

## 2023-06-21 DIAGNOSIS — R79.89 OTHER SPECIFIED ABNORMAL FINDINGS OF BLOOD CHEMISTRY: ICD-10-CM

## 2023-06-21 DIAGNOSIS — E28.2 POLYCYSTIC OVARIAN SYNDROME: ICD-10-CM

## 2023-06-21 PROCEDURE — 99204 OFFICE O/P NEW MOD 45 MIN: CPT

## 2023-06-22 ENCOUNTER — NON-APPOINTMENT (OUTPATIENT)
Age: 23
End: 2023-06-22

## 2023-06-27 LAB — CORE LAB BIOPSY: NORMAL

## 2023-07-11 ENCOUNTER — APPOINTMENT (OUTPATIENT)
Dept: OBGYN | Facility: CLINIC | Age: 23
End: 2023-07-11

## 2023-07-31 ENCOUNTER — NON-APPOINTMENT (OUTPATIENT)
Age: 23
End: 2023-07-31

## 2023-07-31 ENCOUNTER — APPOINTMENT (OUTPATIENT)
Dept: ORTHOPEDIC SURGERY | Facility: CLINIC | Age: 23
End: 2023-07-31
Payer: COMMERCIAL

## 2023-07-31 VITALS
HEIGHT: 65 IN | HEART RATE: 89 BPM | SYSTOLIC BLOOD PRESSURE: 131 MMHG | WEIGHT: 180 LBS | DIASTOLIC BLOOD PRESSURE: 90 MMHG | BODY MASS INDEX: 29.99 KG/M2

## 2023-07-31 PROCEDURE — 72100 X-RAY EXAM L-S SPINE 2/3 VWS: CPT

## 2023-07-31 PROCEDURE — 99204 OFFICE O/P NEW MOD 45 MIN: CPT

## 2023-07-31 RX ORDER — METHYLPREDNISOLONE 4 MG/1
4 TABLET ORAL
Qty: 1 | Refills: 0 | Status: ACTIVE | COMMUNITY
Start: 2023-07-31 | End: 1900-01-01

## 2023-07-31 NOTE — PHYSICAL EXAM
[de-identified] : General Appearance: normal without acute distress Mental: Alert and oriented x 3 Psych/affect: appropriate, cooperative Gait: Normal gait  Lumbar spine Palpation: Tender to palpation at paraspinal muscles on left Motor: 5/5 L2-S1 Sensory: 2/2 L2-S1 Straight leg raise: Positive bilaterally Clonus: < 5 beats [de-identified] : 7/31/2023: AP lateral lumbar spine: Disc space well-preserved

## 2023-07-31 NOTE — DISCUSSION/SUMMARY
[de-identified] : Lumbar radiculopathy  Extensive discussion of the natural history of this issue was had with the patient.  We discussed the treatment options focusing on conservative therapy which includes anti-inflammatories, physical therapy/home exercise, & activity modification.  -A prescription for a Medrol Dosepak with the appropriate warnings and side effects was given to the patient. -Physical therapy prescription was given to the patient. Patient will follow-up in 1 to 2 months if the pain returns or does not improve.  We discussed red flag symptoms and that the patient should immediately report to the emergency department if these occur.

## 2023-10-26 ENCOUNTER — APPOINTMENT (OUTPATIENT)
Dept: ORTHOPEDIC SURGERY | Facility: CLINIC | Age: 23
End: 2023-10-26
Payer: MEDICAID

## 2023-10-26 PROCEDURE — 99214 OFFICE O/P EST MOD 30 MIN: CPT

## 2023-10-26 RX ORDER — MELOXICAM 15 MG/1
15 TABLET ORAL DAILY
Qty: 30 | Refills: 1 | Status: ACTIVE | COMMUNITY
Start: 2023-10-26 | End: 1900-01-01

## 2023-11-07 DIAGNOSIS — M54.16 RADICULOPATHY, LUMBAR REGION: ICD-10-CM

## 2023-11-16 ENCOUNTER — APPOINTMENT (OUTPATIENT)
Dept: RADIOLOGY | Facility: CLINIC | Age: 23
End: 2023-11-16

## 2023-11-16 ENCOUNTER — APPOINTMENT (OUTPATIENT)
Dept: MRI IMAGING | Facility: CLINIC | Age: 23
End: 2023-11-16

## 2024-02-20 ENCOUNTER — NON-APPOINTMENT (OUTPATIENT)
Age: 24
End: 2024-02-20

## 2024-04-26 ENCOUNTER — NON-APPOINTMENT (OUTPATIENT)
Age: 24
End: 2024-04-26

## 2024-05-01 ENCOUNTER — APPOINTMENT (OUTPATIENT)
Dept: ANTEPARTUM | Facility: CLINIC | Age: 24
End: 2024-05-01
Payer: COMMERCIAL

## 2024-05-01 ENCOUNTER — ASOB RESULT (OUTPATIENT)
Age: 24
End: 2024-05-01

## 2024-05-01 ENCOUNTER — APPOINTMENT (OUTPATIENT)
Dept: OBGYN | Facility: CLINIC | Age: 24
End: 2024-05-01
Payer: COMMERCIAL

## 2024-05-01 VITALS
DIASTOLIC BLOOD PRESSURE: 80 MMHG | BODY MASS INDEX: 28.49 KG/M2 | SYSTOLIC BLOOD PRESSURE: 118 MMHG | WEIGHT: 171 LBS | HEIGHT: 65 IN

## 2024-05-01 DIAGNOSIS — Z13.29 ENCOUNTER FOR SCREENING FOR OTHER SUSPECTED ENDOCRINE DISORDER: ICD-10-CM

## 2024-05-01 DIAGNOSIS — Z11.3 ENCOUNTER FOR SCREENING FOR INFECTIONS WITH A PREDOMINANTLY SEXUAL MODE OF TRANSMISSION: ICD-10-CM

## 2024-05-01 DIAGNOSIS — Z13.1 ENCOUNTER FOR SCREENING FOR DIABETES MELLITUS: ICD-10-CM

## 2024-05-01 DIAGNOSIS — Z11.1 ENCOUNTER FOR SCREENING FOR RESPIRATORY TUBERCULOSIS: ICD-10-CM

## 2024-05-01 LAB
APPEARANCE: CLEAR
BILIRUBIN URINE: NEGATIVE
BLOOD URINE: NEGATIVE
COLOR: YELLOW
GLUCOSE QUALITATIVE U: NEGATIVE
KETONES URINE: NEGATIVE
LEUKOCYTE ESTERASE URINE: NEGATIVE
NITRITE URINE: NEGATIVE
PH URINE: 5.5
PROTEIN URINE: NEGATIVE
SPECIFIC GRAVITY URINE: <=1.005
UROBILINOGEN URINE: 0.2 (ref 0.2–?)

## 2024-05-01 PROCEDURE — 0500F INITIAL PRENATAL CARE VISIT: CPT

## 2024-05-01 PROCEDURE — 76817 TRANSVAGINAL US OBSTETRIC: CPT

## 2024-05-01 RX ORDER — FOLIC ACID 1 MG/1
1 TABLET ORAL DAILY
Qty: 30 | Refills: 3 | Status: ACTIVE | COMMUNITY
Start: 2024-05-01 | End: 1900-01-01

## 2024-05-03 LAB
ABO + RH PNL BLD: NORMAL
BACTERIA UR CULT: NORMAL
BLD GP AB SCN SERPL QL: NORMAL
C TRACH RRNA SPEC QL NAA+PROBE: NOT DETECTED
ESTIMATED AVERAGE GLUCOSE: 103 MG/DL
HBA1C MFR BLD HPLC: 5.2 %
HBV SURFACE AG SER QL: NONREACTIVE
HCT VFR BLD CALC: 44.3 %
HCV AB SER QL: NONREACTIVE
HCV S/CO RATIO: 0.19 S/CO
HGB A MFR BLD: 97.6 %
HGB A2 MFR BLD: 2.4 %
HGB BLD-MCNC: 14.3 G/DL
HGB FRACT BLD-IMP: NORMAL
HIV1+2 AB SPEC QL IA.RAPID: NONREACTIVE
MCHC RBC-ENTMCNC: 27.9 PG
MCHC RBC-ENTMCNC: 32.3 GM/DL
MCV RBC AUTO: 86.4 FL
MEV IGG FLD QL IA: 26.5 AU/ML
MEV IGG+IGM SER-IMP: POSITIVE
N GONORRHOEA RRNA SPEC QL NAA+PROBE: NOT DETECTED
PLATELET # BLD AUTO: 300 K/UL
RBC # BLD: 5.13 M/UL
RBC # FLD: 13.8 %
RUBV IGG FLD-ACNC: 2.2 INDEX
RUBV IGG SER-IMP: POSITIVE
SOURCE AMPLIFICATION: NORMAL
T PALLIDUM AB SER QL IA: NEGATIVE
TSH SERPL-ACNC: 1.9 UIU/ML
VZV AB TITR SER: POSITIVE
VZV IGG SER IF-ACNC: 2115 INDEX
WBC # FLD AUTO: 13.37 K/UL

## 2024-05-04 PROBLEM — Z11.3 SCREEN FOR STD (SEXUALLY TRANSMITTED DISEASE): Status: ACTIVE | Noted: 2022-05-02

## 2024-05-04 PROBLEM — Z11.1 TUBERCULOSIS SCREENING: Status: ACTIVE | Noted: 2024-05-04

## 2024-05-04 PROBLEM — Z13.29 SCREENING FOR HYPOTHYROIDISM: Status: ACTIVE | Noted: 2023-02-15

## 2024-05-04 PROBLEM — Z13.1 SCREENING FOR DIABETES MELLITUS: Status: ACTIVE | Noted: 2023-02-15

## 2024-05-07 LAB
M TB IFN-G BLD-IMP: NEGATIVE
QUANTIFERON TB PLUS MITOGEN MINUS NIL: >10 IU/ML
QUANTIFERON TB PLUS NIL: 0.01 IU/ML
QUANTIFERON TB PLUS TB1 MINUS NIL: 0 IU/ML
QUANTIFERON TB PLUS TB2 MINUS NIL: 0 IU/ML

## 2024-05-21 ENCOUNTER — EMERGENCY (EMERGENCY)
Facility: HOSPITAL | Age: 24
LOS: 1 days | Discharge: HOME | End: 2024-05-21
Attending: EMERGENCY MEDICINE
Payer: MEDICAID

## 2024-05-21 VITALS
DIASTOLIC BLOOD PRESSURE: 99 MMHG | RESPIRATION RATE: 16 BRPM | SYSTOLIC BLOOD PRESSURE: 141 MMHG | HEART RATE: 100 BPM | OXYGEN SATURATION: 99 % | WEIGHT: 174.17 LBS | TEMPERATURE: 98 F

## 2024-05-21 PROCEDURE — 99284 EMERGENCY DEPT VISIT MOD MDM: CPT

## 2024-05-21 NOTE — ED ADULT TRIAGE NOTE - CHIEF COMPLAINT QUOTE
c/o vaginal bleeding and cramping since 1900. 11 weeks pregnant. has used one pad, "light pink" bleeding.

## 2024-05-21 NOTE — ED PROVIDER NOTE - PATIENT PORTAL LINK FT
You can access the FollowMyHealth Patient Portal offered by [facility] by registering at the following website: [email or web address]. By joining [email or web address]’s FollowMyHealth portal, you will also be able to view your health information using other applications (apps) compatible with our system.

## 2024-05-21 NOTE — ED PROVIDER NOTE - CLINICAL SUMMARY MEDICAL DECISION MAKING FREE TEXT BOX
23 yo female no PMHx LMP 3/4  11w5d GA presents to ED c/o vaginal spotting since 7pm, only with wiping. 23 yo female no PMHx LMP 3/4  11w5d GA presents to ED c/o vaginal spotting since 7pm, only with wiping. US with live IUP. UA with bacteria, will treat asymptomatic bacteruria with cephalexin. Medically stable for discharge. 23 yo female no PMHx LMP 3/4  11w5d GA presents to ED c/o vaginal spotting since 7pm, only with wiping. US with live IUP. Rh+. UA with bacteria, will treat asymptomatic bacteruria with cephalexin. Medically stable for discharge.

## 2024-05-21 NOTE — ED PROVIDER NOTE - PHYSICAL EXAMINATION
Gen: Nontoxic, well appearing, in NAD.  Skin: Warm and dry as visualized.  Head: NC/AT.  Eyes: PERRLA. EOMI.  Neck: Supple, FROM. Trachea midline.   Resp: No distress.  Cardio: Well perfused.  Abd: Nondistended. Soft, nontender. No guarding. No CVAT.   Ext: No deformities. MAEx4. FROM.   Neuro: A&Ox3. Appears nonfocal.   Psych: Normal affect and mood.

## 2024-05-21 NOTE — ED PROVIDER NOTE - NSFOLLOWUPINSTRUCTIONS_ED_ALL_ED_FT
- Prescription sent to pharmacy.  - Acetaminophen 650mg every 6 hours as needed for pain.   - Please bring all documentation from your ED visit to any related future follow up appointment.  - Please call to schedule follow up appointment with your primary care physician within 24-48 hours.  - Please seek immediate medical attention or return to the ED for any new/worsening, signs/symptoms, or concerns.    Feel better! - Prescription sent to pharmacy.  - No sexual intercourse until cleared by OBGYN.   - Acetaminophen 650mg every 6 hours as needed for pain.   - Please bring all documentation from your ED visit to any related future follow up appointment.  - Please call to schedule follow up appointment with your primary care physician within 24-48 hours.  - Please seek immediate medical attention or return to the ED for any new/worsening, signs/symptoms, or concerns.    Feel better! - Prescription sent to pharmacy.  - Acetaminophen 650mg every 6 hours as needed for pain.   - No sexual intercourse until cleared by OBGYN.   - Please bring all documentation from your ED visit to any related future follow up appointment.  - Please call to schedule follow up appointment with your primary care physician within 24-48 hours.  - Please seek immediate medical attention or return to the ED for any new/worsening, signs/symptoms, or concerns.    Feel better!

## 2024-05-21 NOTE — ED PROVIDER NOTE - OBJECTIVE STATEMENT
25 yo female no PMHx LMP 3/4  11w5d GA presents to ED c/o vaginal spotting since 7pm. Only with wiping, no clots. Intermittent cramping. Last US , next appt. . No further complaints at this time.   OB: Megan Soto

## 2024-05-22 VITALS — SYSTOLIC BLOOD PRESSURE: 113 MMHG | DIASTOLIC BLOOD PRESSURE: 83 MMHG

## 2024-05-22 LAB
ALBUMIN SERPL ELPH-MCNC: 4 G/DL — SIGNIFICANT CHANGE UP (ref 3.3–5.2)
ALP SERPL-CCNC: 115 U/L — SIGNIFICANT CHANGE UP (ref 40–120)
ALT FLD-CCNC: 8 U/L — SIGNIFICANT CHANGE UP
ANION GAP SERPL CALC-SCNC: 13 MMOL/L — SIGNIFICANT CHANGE UP (ref 5–17)
APPEARANCE UR: CLEAR — SIGNIFICANT CHANGE UP
AST SERPL-CCNC: 12 U/L — SIGNIFICANT CHANGE UP
BACTERIA # UR AUTO: ABNORMAL /HPF
BILIRUB SERPL-MCNC: <0.2 MG/DL — LOW (ref 0.4–2)
BILIRUB UR-MCNC: NEGATIVE — SIGNIFICANT CHANGE UP
BLD GP AB SCN SERPL QL: SIGNIFICANT CHANGE UP
BUN SERPL-MCNC: 6.4 MG/DL — LOW (ref 8–20)
CALCIUM SERPL-MCNC: 9.4 MG/DL — SIGNIFICANT CHANGE UP (ref 8.4–10.5)
CAST: 1 /LPF — SIGNIFICANT CHANGE UP (ref 0–4)
CHLORIDE SERPL-SCNC: 103 MMOL/L — SIGNIFICANT CHANGE UP (ref 96–108)
CO2 SERPL-SCNC: 21 MMOL/L — LOW (ref 22–29)
COLOR SPEC: YELLOW — SIGNIFICANT CHANGE UP
CREAT SERPL-MCNC: 0.36 MG/DL — LOW (ref 0.5–1.3)
DIFF PNL FLD: ABNORMAL
EGFR: 144 ML/MIN/1.73M2 — SIGNIFICANT CHANGE UP
GLUCOSE SERPL-MCNC: 83 MG/DL — SIGNIFICANT CHANGE UP (ref 70–99)
GLUCOSE UR QL: NEGATIVE MG/DL — SIGNIFICANT CHANGE UP
HCT VFR BLD CALC: 40.2 % — SIGNIFICANT CHANGE UP (ref 34.5–45)
HGB BLD-MCNC: 13.6 G/DL — SIGNIFICANT CHANGE UP (ref 11.5–15.5)
KETONES UR-MCNC: NEGATIVE MG/DL — SIGNIFICANT CHANGE UP
LEUKOCYTE ESTERASE UR-ACNC: NEGATIVE — SIGNIFICANT CHANGE UP
MCHC RBC-ENTMCNC: 28.2 PG — SIGNIFICANT CHANGE UP (ref 27–34)
MCHC RBC-ENTMCNC: 33.8 GM/DL — SIGNIFICANT CHANGE UP (ref 32–36)
MCV RBC AUTO: 83.4 FL — SIGNIFICANT CHANGE UP (ref 80–100)
NITRITE UR-MCNC: NEGATIVE — SIGNIFICANT CHANGE UP
PH UR: 6 — SIGNIFICANT CHANGE UP (ref 5–8)
PLATELET # BLD AUTO: 246 K/UL — SIGNIFICANT CHANGE UP (ref 150–400)
POTASSIUM SERPL-MCNC: 4 MMOL/L — SIGNIFICANT CHANGE UP (ref 3.5–5.3)
POTASSIUM SERPL-SCNC: 4 MMOL/L — SIGNIFICANT CHANGE UP (ref 3.5–5.3)
PROT SERPL-MCNC: 7 G/DL — SIGNIFICANT CHANGE UP (ref 6.6–8.7)
PROT UR-MCNC: NEGATIVE MG/DL — SIGNIFICANT CHANGE UP
RBC # BLD: 4.82 M/UL — SIGNIFICANT CHANGE UP (ref 3.8–5.2)
RBC # FLD: 13.2 % — SIGNIFICANT CHANGE UP (ref 10.3–14.5)
RBC CASTS # UR COMP ASSIST: 1 /HPF — SIGNIFICANT CHANGE UP (ref 0–4)
SODIUM SERPL-SCNC: 137 MMOL/L — SIGNIFICANT CHANGE UP (ref 135–145)
SP GR SPEC: 1.01 — SIGNIFICANT CHANGE UP (ref 1–1.03)
SQUAMOUS # UR AUTO: 6 /HPF — HIGH (ref 0–5)
UROBILINOGEN FLD QL: 0.2 MG/DL — SIGNIFICANT CHANGE UP (ref 0.2–1)
WBC # BLD: 13.41 K/UL — HIGH (ref 3.8–10.5)
WBC # FLD AUTO: 13.41 K/UL — HIGH (ref 3.8–10.5)
WBC UR QL: 6 /HPF — HIGH (ref 0–5)

## 2024-05-22 PROCEDURE — 76802 OB US < 14 WKS ADDL FETUS: CPT

## 2024-05-22 PROCEDURE — 99284 EMERGENCY DEPT VISIT MOD MDM: CPT

## 2024-05-22 PROCEDURE — 87086 URINE CULTURE/COLONY COUNT: CPT

## 2024-05-22 PROCEDURE — 81001 URINALYSIS AUTO W/SCOPE: CPT

## 2024-05-22 PROCEDURE — 86901 BLOOD TYPING SEROLOGIC RH(D): CPT

## 2024-05-22 PROCEDURE — 36415 COLL VENOUS BLD VENIPUNCTURE: CPT

## 2024-05-22 PROCEDURE — 84702 CHORIONIC GONADOTROPIN TEST: CPT

## 2024-05-22 PROCEDURE — 76815 OB US LIMITED FETUS(S): CPT

## 2024-05-22 PROCEDURE — 76817 TRANSVAGINAL US OBSTETRIC: CPT

## 2024-05-22 PROCEDURE — 80053 COMPREHEN METABOLIC PANEL: CPT

## 2024-05-22 PROCEDURE — 86850 RBC ANTIBODY SCREEN: CPT

## 2024-05-22 PROCEDURE — 76815 OB US LIMITED FETUS(S): CPT | Mod: 26

## 2024-05-22 PROCEDURE — 86900 BLOOD TYPING SEROLOGIC ABO: CPT

## 2024-05-22 PROCEDURE — 76817 TRANSVAGINAL US OBSTETRIC: CPT | Mod: 26

## 2024-05-22 PROCEDURE — 85027 COMPLETE CBC AUTOMATED: CPT

## 2024-05-22 RX ORDER — CEPHALEXIN 500 MG
1 CAPSULE ORAL
Qty: 28 | Refills: 0
Start: 2024-05-22 | End: 2024-05-28

## 2024-05-22 NOTE — ED ADULT NURSE NOTE - OBJECTIVE STATEMENT
Pt presents to ED c/o vaginal bleeding that started around 7pm 5/21. Pt reports she is 11 weeks pregnant. Pt denies saturating pad in 1 hour. RR even and unlabored on room air, NAD. Pt safety maintained.

## 2024-05-23 LAB
CULTURE RESULTS: SIGNIFICANT CHANGE UP
SPECIMEN SOURCE: SIGNIFICANT CHANGE UP

## 2024-05-28 ENCOUNTER — APPOINTMENT (OUTPATIENT)
Dept: OBGYN | Facility: CLINIC | Age: 24
End: 2024-05-28

## 2024-05-30 ENCOUNTER — NON-APPOINTMENT (OUTPATIENT)
Age: 24
End: 2024-05-30

## 2024-05-30 ENCOUNTER — ASOB RESULT (OUTPATIENT)
Age: 24
End: 2024-05-30

## 2024-05-30 ENCOUNTER — APPOINTMENT (OUTPATIENT)
Dept: OBGYN | Facility: CLINIC | Age: 24
End: 2024-05-30
Payer: COMMERCIAL

## 2024-05-30 ENCOUNTER — APPOINTMENT (OUTPATIENT)
Dept: ANTEPARTUM | Facility: CLINIC | Age: 24
End: 2024-05-30
Payer: COMMERCIAL

## 2024-05-30 VITALS
HEIGHT: 65 IN | WEIGHT: 173.25 LBS | BODY MASS INDEX: 28.86 KG/M2 | SYSTOLIC BLOOD PRESSURE: 122 MMHG | DIASTOLIC BLOOD PRESSURE: 68 MMHG

## 2024-05-30 DIAGNOSIS — O30.041 TWIN PREGNANCY, DICHORIONIC/DIAMNIOTIC, FIRST TRIMESTER: ICD-10-CM

## 2024-05-30 PROCEDURE — 76813 OB US NUCHAL MEAS 1 GEST: CPT

## 2024-05-30 PROCEDURE — 76801 OB US < 14 WKS SINGLE FETUS: CPT

## 2024-05-30 PROCEDURE — 76814 OB US NUCHAL MEAS ADD-ON: CPT

## 2024-05-30 PROCEDURE — 0502F SUBSEQUENT PRENATAL CARE: CPT

## 2024-05-31 LAB
APPEARANCE: CLEAR
BILIRUBIN URINE: NEGATIVE
BLOOD URINE: NEGATIVE
COLOR: YELLOW
GLUCOSE QUALITATIVE U: NEGATIVE
KETONES URINE: NEGATIVE
LEUKOCYTE ESTERASE URINE: ABNORMAL
NITRITE URINE: NEGATIVE
PH URINE: 6.5
PROTEIN URINE: NEGATIVE
SPECIFIC GRAVITY URINE: <=1.005
UROBILINOGEN URINE: 0.2 (ref 0.2–?)

## 2024-06-06 ENCOUNTER — NON-APPOINTMENT (OUTPATIENT)
Age: 24
End: 2024-06-06

## 2024-06-06 LAB
ADDITIONAL US: NORMAL
COMMENTS: AFP: NORMAL
CRL SCAN TWIN B: NORMAL
CRL SCAN: NORMAL
CROWN RUMP LENGTH TWIN B: 55.3 MM
CROWN RUMP LENGTH: 52.7 MM
DOWN SYNDROME AGE RISK: NORMAL
DOWN SYNDROME INTERPRETATION: NORMAL
DOWN SYNDROME SCREENING RISK: NORMAL
GEST. AGE ON COLLECTION DATE: 12 WEEKS
HCG MOM: 0.91
HCG VALUE: 83.2 IU/ML
MATERNAL AGE AT EDD: 26.7 YR
NOTE: AFP: NORMAL
NT MOM TWIN B: 0.96
NT TWIN B: 1.2 MM
NUCHAL TRANSLUCENCY (NT): 1.1 MM
NUCHAL TRANSLUCENCY MOM: 0.91
NUMBER OF FETUSES: 2
PAPP-A MOM: 2.86
PAPP-A VALUE: 1942.1 NG/ML
RACE: NORMAL
RESULTS AFP: NORMAL
SONOGRAPHER ID#: NORMAL
SUBMIT PART 2 SAMPLE USING: NORMAL
TEST RESULTS: AFP: NORMAL
TRISOMY 18 AGE RISK: NORMAL
TRISOMY 18 INTERPRETATION: NORMAL
TRISOMY 18 SCREENING RISK: NORMAL
WEIGHT AFP: 173 LBS

## 2024-06-07 ENCOUNTER — NON-APPOINTMENT (OUTPATIENT)
Age: 24
End: 2024-06-07

## 2024-06-07 ENCOUNTER — APPOINTMENT (OUTPATIENT)
Dept: OBGYN | Facility: CLINIC | Age: 24
End: 2024-06-07
Payer: COMMERCIAL

## 2024-06-07 PROCEDURE — 36415 COLL VENOUS BLD VENIPUNCTURE: CPT

## 2024-06-14 ENCOUNTER — NON-APPOINTMENT (OUTPATIENT)
Age: 24
End: 2024-06-14

## 2024-06-14 ENCOUNTER — ASOB RESULT (OUTPATIENT)
Age: 24
End: 2024-06-14

## 2024-06-14 ENCOUNTER — APPOINTMENT (OUTPATIENT)
Dept: OBGYN | Facility: CLINIC | Age: 24
End: 2024-06-14
Payer: COMMERCIAL

## 2024-06-14 ENCOUNTER — APPOINTMENT (OUTPATIENT)
Dept: ANTEPARTUM | Facility: CLINIC | Age: 24
End: 2024-06-14
Payer: COMMERCIAL

## 2024-06-14 ENCOUNTER — TRANSCRIPTION ENCOUNTER (OUTPATIENT)
Age: 24
End: 2024-06-14

## 2024-06-14 VITALS
WEIGHT: 173 LBS | SYSTOLIC BLOOD PRESSURE: 110 MMHG | BODY MASS INDEX: 28.82 KG/M2 | DIASTOLIC BLOOD PRESSURE: 68 MMHG | HEIGHT: 65 IN

## 2024-06-14 DIAGNOSIS — O20.9 HEMORRHAGE IN EARLY PREGNANCY, UNSPECIFIED: ICD-10-CM

## 2024-06-14 LAB
APPEARANCE: CLEAR
BILIRUBIN URINE: NEGATIVE
BLOOD URINE: NEGATIVE
COLOR: YELLOW
GLUCOSE QUALITATIVE U: NEGATIVE
KETONES URINE: NEGATIVE
LEUKOCYTE ESTERASE URINE: ABNORMAL
NITRITE URINE: NEGATIVE
PH URINE: 5.5
PROTEIN URINE: NEGATIVE
SPECIFIC GRAVITY URINE: 1.02
UROBILINOGEN URINE: 0.2 (ref 0.2–?)

## 2024-06-14 PROCEDURE — 76816 OB US FOLLOW-UP PER FETUS: CPT

## 2024-06-14 PROCEDURE — 99213 OFFICE O/P EST LOW 20 MIN: CPT

## 2024-06-14 PROCEDURE — 76817 TRANSVAGINAL US OBSTETRIC: CPT

## 2024-06-17 ENCOUNTER — NON-APPOINTMENT (OUTPATIENT)
Age: 24
End: 2024-06-17

## 2024-06-17 DIAGNOSIS — O30.049 TWIN PREGNANCY, DICHORIONIC/DIAMNIOTIC, UNSPECIFIED TRIMESTER: ICD-10-CM

## 2024-06-25 ENCOUNTER — APPOINTMENT (OUTPATIENT)
Dept: ENDOCRINOLOGY | Facility: CLINIC | Age: 24
End: 2024-06-25

## 2024-06-26 ENCOUNTER — NON-APPOINTMENT (OUTPATIENT)
Age: 24
End: 2024-06-26

## 2024-06-26 ENCOUNTER — APPOINTMENT (OUTPATIENT)
Dept: OBGYN | Facility: CLINIC | Age: 24
End: 2024-06-26
Payer: COMMERCIAL

## 2024-06-26 ENCOUNTER — ASOB RESULT (OUTPATIENT)
Age: 24
End: 2024-06-26

## 2024-06-26 ENCOUNTER — APPOINTMENT (OUTPATIENT)
Dept: ANTEPARTUM | Facility: CLINIC | Age: 24
End: 2024-06-26
Payer: COMMERCIAL

## 2024-06-26 VITALS
DIASTOLIC BLOOD PRESSURE: 60 MMHG | BODY MASS INDEX: 29.32 KG/M2 | WEIGHT: 176 LBS | HEIGHT: 65 IN | SYSTOLIC BLOOD PRESSURE: 130 MMHG

## 2024-06-26 DIAGNOSIS — Z34.92 ENCOUNTER FOR SUPERVISION OF NORMAL PREGNANCY, UNSPECIFIED, SECOND TRIMESTER: ICD-10-CM

## 2024-06-26 LAB
APPEARANCE: CLEAR
BILIRUBIN URINE: NEGATIVE
BLOOD URINE: NEGATIVE
COLOR: YELLOW
GLUCOSE QUALITATIVE U: NEGATIVE
KETONES URINE: NEGATIVE
LEUKOCYTE ESTERASE URINE: NEGATIVE
NITRITE URINE: NEGATIVE
PH URINE: 6.5
PROTEIN URINE: NEGATIVE
SPECIFIC GRAVITY URINE: <=1.005
UROBILINOGEN URINE: 0.2 (ref 0.2–?)

## 2024-06-26 PROCEDURE — 0502F SUBSEQUENT PRENATAL CARE: CPT

## 2024-06-26 PROCEDURE — 76817 TRANSVAGINAL US OBSTETRIC: CPT

## 2024-06-28 ENCOUNTER — APPOINTMENT (OUTPATIENT)
Dept: MATERNAL FETAL MEDICINE | Facility: CLINIC | Age: 24
End: 2024-06-28
Payer: COMMERCIAL

## 2024-06-28 ENCOUNTER — ASOB RESULT (OUTPATIENT)
Age: 24
End: 2024-06-28

## 2024-06-28 PROCEDURE — 99202 OFFICE O/P NEW SF 15 MIN: CPT | Mod: 95

## 2024-06-29 LAB
ADDITIONAL US: NORMAL
AFP MOM: 2.63
AFP VALUE: 73.2 NG/ML
COLLECTED ON 2: NORMAL
COLLECTED ON: NORMAL
CRL SCAN TWIN B: NORMAL
CRL SCAN: NORMAL
CROWN RUMP LENGTH TWIN B: 55.3 MM
CROWN RUMP LENGTH: 52.7 MM
DIA MOM: 1.85
DIA VALUE: 263.6 PG/ML
DOWN SYNDROME AGE RISK: NORMAL
DOWN SYNDROME INTERPRETATION: NORMAL
DOWN SYNDROME SCREENING RISK: NORMAL
FIRST TRIMESTER SAMPLE: NORMAL
GEST. AGE ON COLLECTION DATE: 12 WEEKS
GESTATIONAL AGE: 15.9 WEEKS
HCG MOM: 0.87
HCG VALUE: 29.2 IU/ML
INSULIN DEP DIABETES: NO
MATERNAL AGE AT EDD: 26.7 YR
NT MOM TWIN B: 0.96
NT TWIN B: 1.2 MM
NUCHAL TRANSLUCENCY (NT): 1.1 MM
NUCHAL TRANSLUCENCY MOM: 0.91
NUMBER OF FETUSES: 2
OPEN SPINA BIFIDA: NORMAL
OSB INTERPRETATION: NORMAL
PAPP-A MOM: 2.86
PAPP-A VALUE: 1942.1 NG/ML
RACE: NORMAL
SECOND TRIMESTER SAMPLE: NORMAL
SEQUENTIAL 2 COMMENTS: NORMAL
SEQUENTIAL 2 NOTE: NORMAL
SEQUENTIAL 2 RESULTS: NORMAL
SEQUENTIAL 2 TEST RESULTS: NORMAL
SONOGRAPHER ID#: NORMAL
TRISOMY 18 AGE RISK: NORMAL
TRISOMY 18 INTERPRETATION: NORMAL
TRISOMY 18 SCREENING RISK: NORMAL
UE3 MOM: 1.83
UE3 VALUE: 1.62 NG/ML
WEIGHT AFP: 173 LBS
WEIGHT: 176 LBS

## 2024-07-01 ENCOUNTER — NON-APPOINTMENT (OUTPATIENT)
Age: 24
End: 2024-07-01

## 2024-07-25 ENCOUNTER — ASOB RESULT (OUTPATIENT)
Age: 24
End: 2024-07-25

## 2024-07-25 ENCOUNTER — APPOINTMENT (OUTPATIENT)
Dept: ANTEPARTUM | Facility: CLINIC | Age: 24
End: 2024-07-25
Payer: COMMERCIAL

## 2024-07-25 PROCEDURE — 76812 OB US DETAILED ADDL FETUS: CPT

## 2024-07-25 PROCEDURE — 76817 TRANSVAGINAL US OBSTETRIC: CPT

## 2024-07-25 PROCEDURE — 76811 OB US DETAILED SNGL FETUS: CPT

## 2024-07-30 DIAGNOSIS — R79.89 OTHER SPECIFIED ABNORMAL FINDINGS OF BLOOD CHEMISTRY: ICD-10-CM

## 2024-07-30 DIAGNOSIS — R87.612 LOW GRADE SQUAMOUS INTRAEPITHELIAL LESION ON CYTOLOGIC SMEAR OF CERVIX (LGSIL): ICD-10-CM

## 2024-07-30 DIAGNOSIS — M54.16 RADICULOPATHY, LUMBAR REGION: ICD-10-CM

## 2024-07-30 DIAGNOSIS — R07.89 OTHER CHEST PAIN: ICD-10-CM

## 2024-07-30 DIAGNOSIS — Z13.1 ENCOUNTER FOR SCREENING FOR DIABETES MELLITUS: ICD-10-CM

## 2024-07-30 DIAGNOSIS — Z01.419 ENCOUNTER FOR GYNECOLOGICAL EXAMINATION (GENERAL) (ROUTINE) W/OUT ABNORMAL FINDINGS: ICD-10-CM

## 2024-07-30 DIAGNOSIS — R10.2 PELVIC AND PERINEAL PAIN: ICD-10-CM

## 2024-07-30 DIAGNOSIS — Z87.898 PERSONAL HISTORY OF OTHER SPECIFIED CONDITIONS: ICD-10-CM

## 2024-07-30 DIAGNOSIS — N97.0 FEMALE INFERTILITY ASSOCIATED WITH ANOVULATION: ICD-10-CM

## 2024-07-30 DIAGNOSIS — N91.1 SECONDARY AMENORRHEA: ICD-10-CM

## 2024-07-30 DIAGNOSIS — Z87.42 PERSONAL HISTORY OF OTHER DISEASES OF THE FEMALE GENITAL TRACT: ICD-10-CM

## 2024-07-30 DIAGNOSIS — N91.5 OLIGOMENORRHEA, UNSPECIFIED: ICD-10-CM

## 2024-07-30 DIAGNOSIS — Z11.1 ENCOUNTER FOR SCREENING FOR RESPIRATORY TUBERCULOSIS: ICD-10-CM

## 2024-07-30 DIAGNOSIS — Z32.02 ENCOUNTER FOR PREGNANCY TEST, RESULT NEGATIVE: ICD-10-CM

## 2024-07-30 DIAGNOSIS — Z13.29 ENCOUNTER FOR SCREENING FOR OTHER SUSPECTED ENDOCRINE DISORDER: ICD-10-CM

## 2024-07-30 DIAGNOSIS — Z12.4 ENCOUNTER FOR SCREENING FOR MALIGNANT NEOPLASM OF CERVIX: ICD-10-CM

## 2024-07-30 DIAGNOSIS — E28.2 POLYCYSTIC OVARIAN SYNDROME: ICD-10-CM

## 2024-08-01 ENCOUNTER — ASOB RESULT (OUTPATIENT)
Age: 24
End: 2024-08-01

## 2024-08-01 ENCOUNTER — APPOINTMENT (OUTPATIENT)
Dept: ANTEPARTUM | Facility: CLINIC | Age: 24
End: 2024-08-01
Payer: COMMERCIAL

## 2024-08-01 ENCOUNTER — APPOINTMENT (OUTPATIENT)
Dept: MATERNAL FETAL MEDICINE | Facility: CLINIC | Age: 24
End: 2024-08-01
Payer: COMMERCIAL

## 2024-08-01 VITALS
OXYGEN SATURATION: 98 % | SYSTOLIC BLOOD PRESSURE: 130 MMHG | HEART RATE: 96 BPM | BODY MASS INDEX: 29.95 KG/M2 | WEIGHT: 180 LBS | DIASTOLIC BLOOD PRESSURE: 70 MMHG

## 2024-08-01 DIAGNOSIS — Z3A.21 21 WEEKS GESTATION OF PREGNANCY: ICD-10-CM

## 2024-08-01 DIAGNOSIS — Z87.09 PERSONAL HISTORY OF OTHER DISEASES OF THE RESPIRATORY SYSTEM: ICD-10-CM

## 2024-08-01 PROCEDURE — ZZZZZ: CPT

## 2024-08-01 PROCEDURE — 76816 OB US FOLLOW-UP PER FETUS: CPT

## 2024-08-01 PROCEDURE — 99215 OFFICE O/P EST HI 40 MIN: CPT

## 2024-08-01 RX ORDER — KRILL/OM-3/DHA/EPA/PHOSPHO/AST 1000-230MG
81 CAPSULE ORAL
Refills: 0 | Status: ACTIVE | COMMUNITY
Start: 2024-08-01

## 2024-08-01 RX ORDER — ASPIRIN 81 MG/1
81 TABLET, CHEWABLE ORAL
Qty: 60 | Refills: 6 | Status: ACTIVE | COMMUNITY
Start: 2024-08-01 | End: 1900-01-01

## 2024-08-01 RX ORDER — CHOLECALCIFEROL (VITAMIN D3) 25 MCG
27-0.8-228 TABLET,CHEWABLE ORAL
Refills: 0 | Status: ACTIVE | COMMUNITY
Start: 2024-08-01

## 2024-08-01 NOTE — DISCUSSION/SUMMARY
[FreeTextEntry1] : Rosalia is a 24-year-old G1, P0 being seen today at 21 weeks for spontaneous diamniotic dichorionic twin gestation.  A targeted ultrasound was performed today and no abnormalities were noted and the comprehensive ultrasound study is complete.  Vital signs today reveal blood pressure of 130/70 maternal weight is 180 pounds consistent with a BMI of 29.95 kg.  Diamniotic dichorionic twin gestation.  Management of a dichorionic diamniotic twin gestation was discussed.  Rosalia will have growth scans performed monthly until delivery.  In addition weekly  testing beginning at 32 weeks until delivery was also discussed.  Problems and complications related to a twin gestation including gestational hypertension, preeclampsia and gestational diabetes were discussed.  She is on low-dose aspirin taking 1 tablet daily.  She was advised to alternate 1 tablet and 2 tablets which she will do until 37 weeks.  A 3-hour glucose tolerance test between 24 and 28 weeks is suggested.  We discussed fetal growth discordance.  Signs and symptoms of preeclampsia were discussed and should be reinforced later in the pregnancy.  We discussed the possibility of  labor with  delivery.  Signs and symptoms of  labor were discussed and she will contact your office should any of those occur.  Twin gestations are best delivered at 38 weeks and mode of delivery will be based on fetal presentation.  Rosalia has had  prenatal screening tests performed.  Both her Ultra-Screen and sequential blood work demonstrated low risk for fetal aneuploidy.  However noninterventional prenatal screening blood work has been performed twice and has not been reportable due to low fetal fraction.  She had a genetic consultation which discussed the increased risk of fetal aneuploidy.  Amniocentesis was discussed and was declined.  Report was sent under separate cover.  We discussed amniocentesis again and Rosalia continues to decline diagnostic testing.  She understands that reassuring  screening test does not preclude the possibility of fetal aneuploidy.  She has received the initial COVID-19 vaccination with the Pfizer vaccine.  Risks and benefits of booster vaccination in pregnancy were discussed and after all questions were answered she continues to decline booster vaccination in pregnancy.  She understands she has certain comorbidities that put her at increased risk for problems and/or complications should she have a COVID infection.  She will contact your office to discuss various treatment options should that occur.  Her father has diabetes.  She has a noncontributory medical and surgical history.  She has no known allergies to medications and denies alcohol, tobacco or drug use.  I spent a total of 42 minutes reviewing patient's prenatal record, prenatal blood work, outside medical records, previous consultations and ultrasound reports counseling and coordinating care.  Recommendations;  1.  Growth scan performed monthly. 2.  Low-dose aspirin alternating 1 tablet and 2 tablets until 37 weeks. 3.  3-hour glucose tolerance test between 24 and 28 weeks. 4.  Weekly  testing beginning at 32 weeks until delivery. 5.  Delivery at 38 weeks is recommended. 6.  Follow-up maternal-fetal medicine consultation as clinically indicated.

## 2024-08-07 ENCOUNTER — NON-APPOINTMENT (OUTPATIENT)
Age: 24
End: 2024-08-07

## 2024-08-07 ENCOUNTER — APPOINTMENT (OUTPATIENT)
Dept: OBGYN | Facility: CLINIC | Age: 24
End: 2024-08-07

## 2024-08-07 PROBLEM — R35.0 URINARY FREQUENCY: Status: ACTIVE | Noted: 2024-08-07

## 2024-08-07 PROCEDURE — 0502F SUBSEQUENT PRENATAL CARE: CPT

## 2024-08-08 ENCOUNTER — NON-APPOINTMENT (OUTPATIENT)
Age: 24
End: 2024-08-08

## 2024-08-12 ENCOUNTER — NON-APPOINTMENT (OUTPATIENT)
Age: 24
End: 2024-08-12

## 2024-08-13 ENCOUNTER — NON-APPOINTMENT (OUTPATIENT)
Age: 24
End: 2024-08-13

## 2024-08-13 ENCOUNTER — APPOINTMENT (OUTPATIENT)
Dept: ANTEPARTUM | Facility: CLINIC | Age: 24
End: 2024-08-13
Payer: COMMERCIAL

## 2024-08-13 ENCOUNTER — APPOINTMENT (OUTPATIENT)
Dept: OBGYN | Facility: CLINIC | Age: 24
End: 2024-08-13
Payer: COMMERCIAL

## 2024-08-13 ENCOUNTER — ASOB RESULT (OUTPATIENT)
Age: 24
End: 2024-08-13

## 2024-08-13 DIAGNOSIS — O36.8122: ICD-10-CM

## 2024-08-13 LAB
APPEARANCE: CLEAR
BILIRUBIN URINE: NEGATIVE
BLOOD URINE: ABNORMAL
COLOR: YELLOW
GLUCOSE QUALITATIVE U: NEGATIVE
KETONES URINE: 15
LEUKOCYTE ESTERASE URINE: ABNORMAL
NITRITE URINE: NEGATIVE
PH URINE: 5.5
PROTEIN URINE: NEGATIVE
SPECIFIC GRAVITY URINE: <=1.005
UROBILINOGEN URINE: 0.2 (ref 0.2–?)

## 2024-08-13 PROCEDURE — 0502F SUBSEQUENT PRENATAL CARE: CPT

## 2024-08-13 PROCEDURE — 76815 OB US LIMITED FETUS(S): CPT

## 2024-08-15 ENCOUNTER — NON-APPOINTMENT (OUTPATIENT)
Age: 24
End: 2024-08-15

## 2024-08-21 ENCOUNTER — ASOB RESULT (OUTPATIENT)
Age: 24
End: 2024-08-21

## 2024-08-21 ENCOUNTER — APPOINTMENT (OUTPATIENT)
Dept: ANTEPARTUM | Facility: CLINIC | Age: 24
End: 2024-08-21
Payer: COMMERCIAL

## 2024-08-21 PROCEDURE — 76816 OB US FOLLOW-UP PER FETUS: CPT | Mod: 59

## 2024-08-21 PROCEDURE — 76820 UMBILICAL ARTERY ECHO: CPT | Mod: 59

## 2024-08-21 RX ORDER — SULFAMETHOXAZOLE AND TRIMETHOPRIM 800; 160 MG/1; MG/1
800-160 TABLET ORAL TWICE DAILY
Qty: 6 | Refills: 0 | Status: ACTIVE | COMMUNITY
Start: 2024-08-13

## 2024-08-27 ENCOUNTER — APPOINTMENT (OUTPATIENT)
Dept: MATERNAL FETAL MEDICINE | Facility: CLINIC | Age: 24
End: 2024-08-27
Payer: COMMERCIAL

## 2024-08-27 ENCOUNTER — ASOB RESULT (OUTPATIENT)
Age: 24
End: 2024-08-27

## 2024-08-27 PROCEDURE — 99442: CPT | Mod: 93

## 2024-08-28 ENCOUNTER — NON-APPOINTMENT (OUTPATIENT)
Age: 24
End: 2024-08-28

## 2024-08-30 ENCOUNTER — APPOINTMENT (OUTPATIENT)
Dept: ANTEPARTUM | Facility: CLINIC | Age: 24
End: 2024-08-30
Payer: COMMERCIAL

## 2024-08-30 ENCOUNTER — ASOB RESULT (OUTPATIENT)
Age: 24
End: 2024-08-30

## 2024-08-30 PROCEDURE — 93976 VASCULAR STUDY: CPT

## 2024-08-30 PROCEDURE — 76821 MIDDLE CEREBRAL ARTERY ECHO: CPT

## 2024-08-30 PROCEDURE — 76815 OB US LIMITED FETUS(S): CPT | Mod: 59

## 2024-08-30 PROCEDURE — 76820 UMBILICAL ARTERY ECHO: CPT

## 2024-09-03 ENCOUNTER — ASOB RESULT (OUTPATIENT)
Age: 24
End: 2024-09-03

## 2024-09-03 ENCOUNTER — NON-APPOINTMENT (OUTPATIENT)
Age: 24
End: 2024-09-03

## 2024-09-03 ENCOUNTER — APPOINTMENT (OUTPATIENT)
Dept: ANTEPARTUM | Facility: CLINIC | Age: 24
End: 2024-09-03
Payer: MEDICAID

## 2024-09-03 PROCEDURE — 76820 UMBILICAL ARTERY ECHO: CPT | Mod: 59

## 2024-09-03 PROCEDURE — 93976 VASCULAR STUDY: CPT

## 2024-09-03 PROCEDURE — 76815 OB US LIMITED FETUS(S): CPT

## 2024-09-04 ENCOUNTER — APPOINTMENT (OUTPATIENT)
Dept: OBGYN | Facility: CLINIC | Age: 24
End: 2024-09-04
Payer: MEDICAID

## 2024-09-04 DIAGNOSIS — O30.049 TWIN PREGNANCY, DICHORIONIC/DIAMNIOTIC, UNSPECIFIED TRIMESTER: ICD-10-CM

## 2024-09-04 DIAGNOSIS — O99.719 DISEASES OF THE SKIN AND SUBCUTANEOUS TISSUE COMPLICATING PREGNANCY, UNSPECIFIED TRIMESTER: ICD-10-CM

## 2024-09-04 DIAGNOSIS — Z34.92 ENCOUNTER FOR SUPERVISION OF NORMAL PREGNANCY, UNSPECIFIED, SECOND TRIMESTER: ICD-10-CM

## 2024-09-04 LAB
APPEARANCE: CLEAR
BILIRUBIN URINE: NEGATIVE
BLOOD URINE: NEGATIVE
COLOR: YELLOW
GLUCOSE QUALITATIVE U: NEGATIVE
KETONES URINE: NEGATIVE
LEUKOCYTE ESTERASE URINE: ABNORMAL
NITRITE URINE: NEGATIVE
PH URINE: 5.5
PROTEIN URINE: NEGATIVE
SPECIFIC GRAVITY URINE: >=1.03
UROBILINOGEN URINE: 0.2 (ref 0.2–?)

## 2024-09-04 PROCEDURE — 0502F SUBSEQUENT PRENATAL CARE: CPT

## 2024-09-05 ENCOUNTER — NON-APPOINTMENT (OUTPATIENT)
Age: 24
End: 2024-09-05

## 2024-09-06 ENCOUNTER — APPOINTMENT (OUTPATIENT)
Dept: ANTEPARTUM | Facility: CLINIC | Age: 24
End: 2024-09-06
Payer: COMMERCIAL

## 2024-09-06 ENCOUNTER — ASOB RESULT (OUTPATIENT)
Age: 24
End: 2024-09-06

## 2024-09-06 ENCOUNTER — RX RENEWAL (OUTPATIENT)
Age: 24
End: 2024-09-06

## 2024-09-06 PROCEDURE — 76821 MIDDLE CEREBRAL ARTERY ECHO: CPT | Mod: 59

## 2024-09-06 PROCEDURE — 76819 FETAL BIOPHYS PROFIL W/O NST: CPT | Mod: 59

## 2024-09-06 PROCEDURE — 76816 OB US FOLLOW-UP PER FETUS: CPT | Mod: 59

## 2024-09-06 PROCEDURE — 76820 UMBILICAL ARTERY ECHO: CPT | Mod: 59

## 2024-09-06 PROCEDURE — 36415 COLL VENOUS BLD VENIPUNCTURE: CPT

## 2024-09-10 ENCOUNTER — APPOINTMENT (OUTPATIENT)
Dept: ANTEPARTUM | Facility: CLINIC | Age: 24
End: 2024-09-10
Payer: MEDICAID

## 2024-09-10 ENCOUNTER — ASOB RESULT (OUTPATIENT)
Age: 24
End: 2024-09-10

## 2024-09-10 PROCEDURE — 76821 MIDDLE CEREBRAL ARTERY ECHO: CPT | Mod: 59

## 2024-09-10 PROCEDURE — 76820 UMBILICAL ARTERY ECHO: CPT | Mod: 59

## 2024-09-10 PROCEDURE — 76818 FETAL BIOPHYS PROFILE W/NST: CPT | Mod: 59

## 2024-09-11 DIAGNOSIS — Z3A.21 21 WEEKS GESTATION OF PREGNANCY: ICD-10-CM

## 2024-09-11 LAB
ALBUMIN SERPL ELPH-MCNC: 4 G/DL
ALP BLD-CCNC: 163 U/L
ALT SERPL-CCNC: 11 U/L
ANION GAP SERPL CALC-SCNC: 21 MMOL/L
AST SERPL-CCNC: 16 U/L
BILIRUB SERPL-MCNC: 0.2 MG/DL
BUN SERPL-MCNC: 8 MG/DL
CALCIUM SERPL-MCNC: 9.4 MG/DL
CHLORIDE SERPL-SCNC: 100 MMOL/L
CO2 SERPL-SCNC: 20 MMOL/L
CREAT SERPL-MCNC: 0.45 MG/DL
CREAT SPEC-SCNC: 72 MG/DL
CREAT/PROT UR: 0.2 RATIO
EGFR: 136 ML/MIN/1.73M2
GLUCOSE 1H P 50 G GLC PO SERPL-MCNC: 101 MG/DL
GLUCOSE SERPL-MCNC: 39 MG/DL
HCT VFR BLD CALC: 41.4 %
HGB BLD-MCNC: 12.8 G/DL
MCHC RBC-ENTMCNC: 29 PG
MCHC RBC-ENTMCNC: 30.9 GM/DL
MCV RBC AUTO: 93.7 FL
PLATELET # BLD AUTO: 259 K/UL
POTASSIUM SERPL-SCNC: 3.5 MMOL/L
PROT SERPL-MCNC: 6.7 G/DL
PROT UR-MCNC: 13 MG/DL
RBC # BLD: 4.42 M/UL
RBC # FLD: 14.2 %
SODIUM SERPL-SCNC: 141 MMOL/L
WBC # FLD AUTO: 11.4 K/UL

## 2024-09-13 ENCOUNTER — APPOINTMENT (OUTPATIENT)
Dept: ANTEPARTUM | Facility: CLINIC | Age: 24
End: 2024-09-13
Payer: COMMERCIAL

## 2024-09-13 ENCOUNTER — ASOB RESULT (OUTPATIENT)
Age: 24
End: 2024-09-13

## 2024-09-13 PROCEDURE — 76819 FETAL BIOPHYS PROFIL W/O NST: CPT

## 2024-09-13 PROCEDURE — 76820 UMBILICAL ARTERY ECHO: CPT | Mod: 59

## 2024-09-13 PROCEDURE — 76821 MIDDLE CEREBRAL ARTERY ECHO: CPT

## 2024-09-17 ENCOUNTER — APPOINTMENT (OUTPATIENT)
Dept: MATERNAL FETAL MEDICINE | Facility: CLINIC | Age: 24
End: 2024-09-17
Payer: COMMERCIAL

## 2024-09-17 ENCOUNTER — APPOINTMENT (OUTPATIENT)
Dept: ANTEPARTUM | Facility: CLINIC | Age: 24
End: 2024-09-17
Payer: MEDICAID

## 2024-09-17 ENCOUNTER — ASOB RESULT (OUTPATIENT)
Age: 24
End: 2024-09-17

## 2024-09-17 ENCOUNTER — APPOINTMENT (OUTPATIENT)
Dept: ANTEPARTUM | Facility: CLINIC | Age: 24
End: 2024-09-17
Payer: COMMERCIAL

## 2024-09-17 VITALS
WEIGHT: 183 LBS | BODY MASS INDEX: 30.45 KG/M2 | DIASTOLIC BLOOD PRESSURE: 70 MMHG | HEART RATE: 105 BPM | OXYGEN SATURATION: 99 % | SYSTOLIC BLOOD PRESSURE: 130 MMHG

## 2024-09-17 DIAGNOSIS — Z87.09 PERSONAL HISTORY OF OTHER DISEASES OF THE RESPIRATORY SYSTEM: ICD-10-CM

## 2024-09-17 DIAGNOSIS — Z3A.28 28 WEEKS GESTATION OF PREGNANCY: ICD-10-CM

## 2024-09-17 DIAGNOSIS — O30.049 TWIN PREGNANCY, DICHORIONIC/DIAMNIOTIC, UNSPECIFIED TRIMESTER: ICD-10-CM

## 2024-09-17 DIAGNOSIS — O36.5992: ICD-10-CM

## 2024-09-17 PROCEDURE — 76818 FETAL BIOPHYS PROFILE W/NST: CPT | Mod: 59

## 2024-09-17 PROCEDURE — 76820 UMBILICAL ARTERY ECHO: CPT | Mod: 59

## 2024-09-17 PROCEDURE — ZZZZZ: CPT

## 2024-09-17 PROCEDURE — 99214 OFFICE O/P EST MOD 30 MIN: CPT

## 2024-09-17 PROCEDURE — 76821 MIDDLE CEREBRAL ARTERY ECHO: CPT | Mod: 59

## 2024-09-17 PROCEDURE — 76818 FETAL BIOPHYS PROFILE W/NST: CPT

## 2024-09-17 PROCEDURE — 93976 VASCULAR STUDY: CPT

## 2024-09-17 NOTE — DATA REVIEWED
[FreeTextEntry1] : Todays testing shows BPP 8/8 on both twins with normal Doppler flow noted.   She is scheduled for an interval growth sonogram next week to assess the growth of both twins, especially twin B.   We discussed indetail the differences between SGA and IUGr, and the need for not only serial growth sonograms but also the benefits of Doppler flow studies and frequent fetal testing.   We reviewed the scheduled that has been made for the upcoming weeks, as well as the different potential delivery options pending these results.   All of Rosalia questions were answered.

## 2024-09-17 NOTE — HISTORY OF PRESENT ILLNESS
[FreeTextEntry1] : Rosalia presents for BPP/Doppler for a set of Di/Di twins with a significant growth discordance and IUGR of twin B

## 2024-09-17 NOTE — DISCUSSION/SUMMARY
[FreeTextEntry1] : Twice weekly testing is scheduled.   Interval growth sonogram is scheduled for next week.   If Doppler flow studies and fetal testing remain reassuring, will reevaluate delivery timing base don fetal growth in third trimester.

## 2024-09-19 ENCOUNTER — NON-APPOINTMENT (OUTPATIENT)
Age: 24
End: 2024-09-19

## 2024-09-20 ENCOUNTER — APPOINTMENT (OUTPATIENT)
Dept: ANTEPARTUM | Facility: CLINIC | Age: 24
End: 2024-09-20
Payer: MEDICAID

## 2024-09-20 ENCOUNTER — ASOB RESULT (OUTPATIENT)
Age: 24
End: 2024-09-20

## 2024-09-20 ENCOUNTER — APPOINTMENT (OUTPATIENT)
Dept: OBGYN | Facility: CLINIC | Age: 24
End: 2024-09-20
Payer: MEDICAID

## 2024-09-20 VITALS
HEIGHT: 65 IN | SYSTOLIC BLOOD PRESSURE: 130 MMHG | WEIGHT: 184.13 LBS | BODY MASS INDEX: 30.68 KG/M2 | DIASTOLIC BLOOD PRESSURE: 64 MMHG

## 2024-09-20 DIAGNOSIS — Z34.92 ENCOUNTER FOR SUPERVISION OF NORMAL PREGNANCY, UNSPECIFIED, SECOND TRIMESTER: ICD-10-CM

## 2024-09-20 PROCEDURE — 0502F SUBSEQUENT PRENATAL CARE: CPT

## 2024-09-20 PROCEDURE — 76821 MIDDLE CEREBRAL ARTERY ECHO: CPT | Mod: 59

## 2024-09-20 PROCEDURE — 76820 UMBILICAL ARTERY ECHO: CPT | Mod: 59

## 2024-09-20 PROCEDURE — 76818 FETAL BIOPHYS PROFILE W/NST: CPT | Mod: 59

## 2024-09-20 PROCEDURE — 59426 ANTEPARTUM CARE ONLY: CPT

## 2024-09-20 PROCEDURE — 93976 VASCULAR STUDY: CPT

## 2024-09-22 LAB
APPEARANCE: CLEAR
BILIRUBIN URINE: NEGATIVE
BLOOD URINE: NEGATIVE
COLOR: YELLOW
GLUCOSE QUALITATIVE U: NEGATIVE
KETONES URINE: NEGATIVE
LEUKOCYTE ESTERASE URINE: ABNORMAL
NITRITE URINE: NEGATIVE
PH URINE: 6
PROTEIN URINE: NEGATIVE
SPECIFIC GRAVITY URINE: 1.02
UROBILINOGEN URINE: 0.2 (ref 0.2–?)

## 2024-09-24 ENCOUNTER — APPOINTMENT (OUTPATIENT)
Dept: ANTEPARTUM | Facility: CLINIC | Age: 24
End: 2024-09-24
Payer: MEDICAID

## 2024-09-24 ENCOUNTER — ASOB RESULT (OUTPATIENT)
Age: 24
End: 2024-09-24

## 2024-09-24 ENCOUNTER — APPOINTMENT (OUTPATIENT)
Dept: ANTEPARTUM | Facility: CLINIC | Age: 24
End: 2024-09-24

## 2024-09-24 PROCEDURE — 76820 UMBILICAL ARTERY ECHO: CPT

## 2024-09-24 PROCEDURE — 76819 FETAL BIOPHYS PROFIL W/O NST: CPT | Mod: 59

## 2024-09-24 PROCEDURE — 93976 VASCULAR STUDY: CPT

## 2024-09-24 PROCEDURE — 76820 UMBILICAL ARTERY ECHO: CPT | Mod: 59

## 2024-09-24 PROCEDURE — 76819 FETAL BIOPHYS PROFIL W/O NST: CPT

## 2024-09-24 PROCEDURE — 76821 MIDDLE CEREBRAL ARTERY ECHO: CPT | Mod: 59

## 2024-09-25 ENCOUNTER — NON-APPOINTMENT (OUTPATIENT)
Age: 24
End: 2024-09-25

## 2024-09-27 ENCOUNTER — ASOB RESULT (OUTPATIENT)
Age: 24
End: 2024-09-27

## 2024-09-27 ENCOUNTER — APPOINTMENT (OUTPATIENT)
Dept: ANTEPARTUM | Facility: CLINIC | Age: 24
End: 2024-09-27
Payer: COMMERCIAL

## 2024-09-27 PROCEDURE — 76818 FETAL BIOPHYS PROFILE W/NST: CPT

## 2024-09-27 PROCEDURE — 76820 UMBILICAL ARTERY ECHO: CPT

## 2024-09-27 PROCEDURE — 76818 FETAL BIOPHYS PROFILE W/NST: CPT | Mod: 59

## 2024-09-27 PROCEDURE — 76821 MIDDLE CEREBRAL ARTERY ECHO: CPT

## 2024-09-28 ENCOUNTER — INPATIENT (INPATIENT)
Facility: HOSPITAL | Age: 24
LOS: 0 days | Discharge: ANOTHER HOSPITAL | DRG: 833 | End: 2024-09-29
Attending: STUDENT IN AN ORGANIZED HEALTH CARE EDUCATION/TRAINING PROGRAM | Admitting: STUDENT IN AN ORGANIZED HEALTH CARE EDUCATION/TRAINING PROGRAM
Payer: MEDICAID

## 2024-09-28 DIAGNOSIS — O26.899 OTHER SPECIFIED PREGNANCY RELATED CONDITIONS, UNSPECIFIED TRIMESTER: ICD-10-CM

## 2024-09-29 ENCOUNTER — INPATIENT (INPATIENT)
Facility: HOSPITAL | Age: 24
LOS: 32 days | Discharge: HOME | End: 2024-11-01
Attending: OBSTETRICS & GYNECOLOGY | Admitting: OBSTETRICS & GYNECOLOGY
Payer: MEDICAID

## 2024-09-29 VITALS — SYSTOLIC BLOOD PRESSURE: 166 MMHG | TEMPERATURE: 98 F | HEART RATE: 97 BPM | DIASTOLIC BLOOD PRESSURE: 109 MMHG

## 2024-09-29 VITALS — SYSTOLIC BLOOD PRESSURE: 129 MMHG | DIASTOLIC BLOOD PRESSURE: 86 MMHG | HEART RATE: 123 BPM | TEMPERATURE: 99 F

## 2024-09-29 VITALS — OXYGEN SATURATION: 97 % | HEART RATE: 115 BPM

## 2024-09-29 DIAGNOSIS — Z3A.29 29 WEEKS GESTATION OF PREGNANCY: ICD-10-CM

## 2024-09-29 DIAGNOSIS — O14.90 UNSPECIFIED PRE-ECLAMPSIA, UNSPECIFIED TRIMESTER: ICD-10-CM

## 2024-09-29 DIAGNOSIS — O30.049 TWIN PREGNANCY, DICHORIONIC/DIAMNIOTIC, UNSPECIFIED TRIMESTER: ICD-10-CM

## 2024-09-29 DIAGNOSIS — O14.00 MILD TO MODERATE PRE-ECLAMPSIA, UNSPECIFIED TRIMESTER: ICD-10-CM

## 2024-09-29 LAB
ALBUMIN SERPL ELPH-MCNC: 3.6 G/DL — SIGNIFICANT CHANGE UP (ref 3.3–5.2)
ALBUMIN SERPL ELPH-MCNC: 3.9 G/DL — SIGNIFICANT CHANGE UP (ref 3.3–5)
ALP SERPL-CCNC: 188 U/L — HIGH (ref 40–120)
ALP SERPL-CCNC: 192 U/L — HIGH (ref 40–120)
ALT FLD-CCNC: 12 U/L — SIGNIFICANT CHANGE UP
ALT FLD-CCNC: 12 U/L — SIGNIFICANT CHANGE UP (ref 10–45)
ANION GAP SERPL CALC-SCNC: 15 MMOL/L — SIGNIFICANT CHANGE UP (ref 5–17)
ANION GAP SERPL CALC-SCNC: 18 MMOL/L — HIGH (ref 5–17)
APPEARANCE UR: CLEAR — SIGNIFICANT CHANGE UP
APTT BLD: 24.6 SEC — SIGNIFICANT CHANGE UP (ref 24.5–35.6)
AST SERPL-CCNC: 15 U/L — SIGNIFICANT CHANGE UP (ref 10–40)
AST SERPL-CCNC: 29 U/L — SIGNIFICANT CHANGE UP
BASOPHILS # BLD AUTO: 0.02 K/UL — SIGNIFICANT CHANGE UP (ref 0–0.2)
BASOPHILS # BLD AUTO: 0.03 K/UL — SIGNIFICANT CHANGE UP (ref 0–0.2)
BASOPHILS NFR BLD AUTO: 0.2 % — SIGNIFICANT CHANGE UP (ref 0–2)
BASOPHILS NFR BLD AUTO: 0.2 % — SIGNIFICANT CHANGE UP (ref 0–2)
BILIRUB SERPL-MCNC: 0.2 MG/DL — LOW (ref 0.4–2)
BILIRUB SERPL-MCNC: 0.2 MG/DL — SIGNIFICANT CHANGE UP (ref 0.2–1.2)
BILIRUB UR-MCNC: NEGATIVE — SIGNIFICANT CHANGE UP
BLD GP AB SCN SERPL QL: NEGATIVE — SIGNIFICANT CHANGE UP
BLD GP AB SCN SERPL QL: SIGNIFICANT CHANGE UP
BUN SERPL-MCNC: 6 MG/DL — LOW (ref 7–23)
BUN SERPL-MCNC: 7 MG/DL — LOW (ref 8–20)
CALCIUM SERPL-MCNC: 8.4 MG/DL — SIGNIFICANT CHANGE UP (ref 8.4–10.5)
CALCIUM SERPL-MCNC: 9.3 MG/DL — SIGNIFICANT CHANGE UP (ref 8.4–10.5)
CHLORIDE SERPL-SCNC: 106 MMOL/L — SIGNIFICANT CHANGE UP (ref 96–108)
CHLORIDE SERPL-SCNC: 99 MMOL/L — SIGNIFICANT CHANGE UP (ref 96–108)
CO2 SERPL-SCNC: 17 MMOL/L — LOW (ref 22–29)
CO2 SERPL-SCNC: 18 MMOL/L — LOW (ref 22–31)
COLOR SPEC: YELLOW — SIGNIFICANT CHANGE UP
CREAT ?TM UR-MCNC: 28 MG/DL — SIGNIFICANT CHANGE UP
CREAT SERPL-MCNC: 0.32 MG/DL — LOW (ref 0.5–1.3)
CREAT SERPL-MCNC: 0.38 MG/DL — LOW (ref 0.5–1.3)
DIFF PNL FLD: NEGATIVE — SIGNIFICANT CHANGE UP
EGFR: 142 ML/MIN/1.73M2 — SIGNIFICANT CHANGE UP
EGFR: 148 ML/MIN/1.73M2 — SIGNIFICANT CHANGE UP
EGFR: 148 ML/MIN/1.73M2 — SIGNIFICANT CHANGE UP
EOSINOPHIL # BLD AUTO: 0 K/UL — SIGNIFICANT CHANGE UP (ref 0–0.5)
EOSINOPHIL # BLD AUTO: 0.19 K/UL — SIGNIFICANT CHANGE UP (ref 0–0.5)
EOSINOPHIL NFR BLD AUTO: 0 % — SIGNIFICANT CHANGE UP (ref 0–6)
EOSINOPHIL NFR BLD AUTO: 1.5 % — SIGNIFICANT CHANGE UP (ref 0–6)
FIBRINOGEN PPP-MCNC: 424 MG/DL — SIGNIFICANT CHANGE UP (ref 200–450)
FIBRINOGEN PPP-MCNC: 529 MG/DL — HIGH (ref 200–450)
GLUCOSE SERPL-MCNC: 138 MG/DL — HIGH (ref 70–99)
GLUCOSE SERPL-MCNC: 69 MG/DL — LOW (ref 70–99)
GLUCOSE UR QL: NEGATIVE MG/DL — SIGNIFICANT CHANGE UP
HCT VFR BLD CALC: 38 % — SIGNIFICANT CHANGE UP (ref 34.5–45)
HCT VFR BLD CALC: 38.4 % — SIGNIFICANT CHANGE UP (ref 34.5–45)
HGB BLD-MCNC: 12.6 G/DL — SIGNIFICANT CHANGE UP (ref 11.5–15.5)
HGB BLD-MCNC: 13.4 G/DL — SIGNIFICANT CHANGE UP (ref 11.5–15.5)
IMM GRANULOCYTES NFR BLD AUTO: 0.3 % — SIGNIFICANT CHANGE UP (ref 0–0.9)
IMM GRANULOCYTES NFR BLD AUTO: 0.5 % — SIGNIFICANT CHANGE UP (ref 0–0.9)
INR BLD: 0.91 RATIO — SIGNIFICANT CHANGE UP (ref 0.85–1.16)
KETONES UR-MCNC: NEGATIVE MG/DL — SIGNIFICANT CHANGE UP
LDH SERPL L TO P-CCNC: 177 U/L — SIGNIFICANT CHANGE UP (ref 50–242)
LDH SERPL L TO P-CCNC: 297 U/L — HIGH (ref 98–192)
LEUKOCYTE ESTERASE UR-ACNC: NEGATIVE — SIGNIFICANT CHANGE UP
LYMPHOCYTES # BLD AUTO: 0.71 K/UL — LOW (ref 1–3.3)
LYMPHOCYTES # BLD AUTO: 18.2 % — SIGNIFICANT CHANGE UP (ref 13–44)
LYMPHOCYTES # BLD AUTO: 2.32 K/UL — SIGNIFICANT CHANGE UP (ref 1–3.3)
LYMPHOCYTES # BLD AUTO: 6.9 % — LOW (ref 13–44)
MAGNESIUM SERPL-MCNC: 4 MG/DL — HIGH (ref 1.6–2.6)
MAGNESIUM SERPL-MCNC: 4.9 MG/DL — HIGH (ref 1.6–2.6)
MAGNESIUM SERPL-MCNC: 5.2 MG/DL — HIGH (ref 1.6–2.6)
MCHC RBC-ENTMCNC: 27.8 PG — SIGNIFICANT CHANGE UP (ref 27–34)
MCHC RBC-ENTMCNC: 28.8 PG — SIGNIFICANT CHANGE UP (ref 27–34)
MCHC RBC-ENTMCNC: 33.2 GM/DL — SIGNIFICANT CHANGE UP (ref 32–36)
MCHC RBC-ENTMCNC: 34.9 GM/DL — SIGNIFICANT CHANGE UP (ref 32–36)
MCV RBC AUTO: 82.4 FL — SIGNIFICANT CHANGE UP (ref 80–100)
MCV RBC AUTO: 83.7 FL — SIGNIFICANT CHANGE UP (ref 80–100)
MONOCYTES # BLD AUTO: 0.22 K/UL — SIGNIFICANT CHANGE UP (ref 0–0.9)
MONOCYTES # BLD AUTO: 0.97 K/UL — HIGH (ref 0–0.9)
MONOCYTES NFR BLD AUTO: 2.1 % — SIGNIFICANT CHANGE UP (ref 2–14)
MONOCYTES NFR BLD AUTO: 7.6 % — SIGNIFICANT CHANGE UP (ref 2–14)
NEUTROPHILS # BLD AUTO: 9.18 K/UL — HIGH (ref 1.8–7.4)
NEUTROPHILS # BLD AUTO: 9.27 K/UL — HIGH (ref 1.8–7.4)
NEUTROPHILS NFR BLD AUTO: 72.2 % — SIGNIFICANT CHANGE UP (ref 43–77)
NEUTROPHILS NFR BLD AUTO: 90.3 % — HIGH (ref 43–77)
NITRITE UR-MCNC: NEGATIVE — SIGNIFICANT CHANGE UP
NRBC # BLD: 0 /100 WBCS — SIGNIFICANT CHANGE UP (ref 0–0)
PH UR: 6 — SIGNIFICANT CHANGE UP (ref 5–8)
PLATELET # BLD AUTO: 260 K/UL — SIGNIFICANT CHANGE UP (ref 150–400)
PLATELET # BLD AUTO: 264 K/UL — SIGNIFICANT CHANGE UP (ref 150–400)
POTASSIUM SERPL-MCNC: 3.7 MMOL/L — SIGNIFICANT CHANGE UP (ref 3.5–5.3)
POTASSIUM SERPL-MCNC: 3.9 MMOL/L — SIGNIFICANT CHANGE UP (ref 3.5–5.3)
POTASSIUM SERPL-SCNC: 3.7 MMOL/L — SIGNIFICANT CHANGE UP (ref 3.5–5.3)
POTASSIUM SERPL-SCNC: 3.9 MMOL/L — SIGNIFICANT CHANGE UP (ref 3.5–5.3)
PROT ?TM UR-MCNC: 10 MG/DL — SIGNIFICANT CHANGE UP (ref 0–12)
PROT SERPL-MCNC: 6.5 G/DL — LOW (ref 6.6–8.7)
PROT SERPL-MCNC: 7.1 G/DL — SIGNIFICANT CHANGE UP (ref 6–8.3)
PROT UR-MCNC: NEGATIVE MG/DL — SIGNIFICANT CHANGE UP
PROT/CREAT UR-RTO: 0.4 RATIO — HIGH
PROTHROM AB SERPL-ACNC: 10.6 SEC — SIGNIFICANT CHANGE UP (ref 9.9–13.4)
RBC # BLD: 4.54 M/UL — SIGNIFICANT CHANGE UP (ref 3.8–5.2)
RBC # BLD: 4.66 M/UL — SIGNIFICANT CHANGE UP (ref 3.8–5.2)
RBC # FLD: 13 % — SIGNIFICANT CHANGE UP (ref 10.3–14.5)
RBC # FLD: 13 % — SIGNIFICANT CHANGE UP (ref 10.3–14.5)
RH IG SCN BLD-IMP: POSITIVE — SIGNIFICANT CHANGE UP
SODIUM SERPL-SCNC: 134 MMOL/L — LOW (ref 135–145)
SODIUM SERPL-SCNC: 139 MMOL/L — SIGNIFICANT CHANGE UP (ref 135–145)
SP GR SPEC: 1.01 — SIGNIFICANT CHANGE UP (ref 1–1.03)
URATE SERPL-MCNC: 3.4 MG/DL — SIGNIFICANT CHANGE UP (ref 2.4–5.7)
URATE SERPL-MCNC: 4.2 MG/DL — SIGNIFICANT CHANGE UP (ref 2.5–7)
UROBILINOGEN FLD QL: 0.2 MG/DL — SIGNIFICANT CHANGE UP (ref 0.2–1)
WBC # BLD: 10.27 K/UL — SIGNIFICANT CHANGE UP (ref 3.8–10.5)
WBC # BLD: 12.73 K/UL — HIGH (ref 3.8–10.5)
WBC # FLD AUTO: 10.27 K/UL — SIGNIFICANT CHANGE UP (ref 3.8–10.5)
WBC # FLD AUTO: 12.73 K/UL — HIGH (ref 3.8–10.5)

## 2024-09-29 PROCEDURE — 99221 1ST HOSP IP/OBS SF/LOW 40: CPT

## 2024-09-29 PROCEDURE — 36415 COLL VENOUS BLD VENIPUNCTURE: CPT

## 2024-09-29 PROCEDURE — 80053 COMPREHEN METABOLIC PANEL: CPT

## 2024-09-29 PROCEDURE — 86900 BLOOD TYPING SEROLOGIC ABO: CPT

## 2024-09-29 PROCEDURE — 83735 ASSAY OF MAGNESIUM: CPT

## 2024-09-29 PROCEDURE — 85610 PROTHROMBIN TIME: CPT

## 2024-09-29 PROCEDURE — 84550 ASSAY OF BLOOD/URIC ACID: CPT

## 2024-09-29 PROCEDURE — 86901 BLOOD TYPING SEROLOGIC RH(D): CPT

## 2024-09-29 PROCEDURE — 85730 THROMBOPLASTIN TIME PARTIAL: CPT

## 2024-09-29 PROCEDURE — 85025 COMPLETE CBC W/AUTO DIFF WBC: CPT

## 2024-09-29 PROCEDURE — 81003 URINALYSIS AUTO W/O SCOPE: CPT

## 2024-09-29 PROCEDURE — 84156 ASSAY OF PROTEIN URINE: CPT

## 2024-09-29 PROCEDURE — 85384 FIBRINOGEN ACTIVITY: CPT

## 2024-09-29 PROCEDURE — 82570 ASSAY OF URINE CREATININE: CPT

## 2024-09-29 PROCEDURE — 99222 1ST HOSP IP/OBS MODERATE 55: CPT | Mod: GC

## 2024-09-29 PROCEDURE — 86850 RBC ANTIBODY SCREEN: CPT

## 2024-09-29 PROCEDURE — 83615 LACTATE (LD) (LDH) ENZYME: CPT

## 2024-09-29 RX ORDER — INFLUENZ VIR VAC TV P-SURF2003 15MCG/.5ML
0.5 SYRINGE (ML) INTRAMUSCULAR ONCE
Refills: 0 | Status: COMPLETED | OUTPATIENT
Start: 2024-09-29 | End: 2024-09-29

## 2024-09-29 RX ORDER — NIFEDIPINE 30 MG
30 TABLET, EXTENDED RELEASE 24 HR ORAL DAILY
Refills: 0 | Status: DISCONTINUED | OUTPATIENT
Start: 2024-09-29 | End: 2024-09-29

## 2024-09-29 RX ORDER — PRENATAL VIT/IRON FUM/FOLIC AC 60 MG-1 MG
1 TABLET ORAL DAILY
Refills: 0 | Status: DISCONTINUED | OUTPATIENT
Start: 2024-09-29 | End: 2024-09-30

## 2024-09-29 RX ORDER — ONDANSETRON HYDROCHLORIDE 2 MG/ML
4 INJECTION, SOLUTION INTRAMUSCULAR; INTRAVENOUS EVERY 8 HOURS
Refills: 0 | Status: DISCONTINUED | OUTPATIENT
Start: 2024-09-29 | End: 2024-11-01

## 2024-09-29 RX ORDER — MAGNESIUM SULFATE IN 0.9% NACL 2 G/50 ML
2 INTRAVENOUS SOLUTION, PIGGYBACK (ML) INTRAVENOUS
Qty: 40 | Refills: 0 | Status: DISCONTINUED | OUTPATIENT
Start: 2024-09-29 | End: 2024-09-30

## 2024-09-29 RX ORDER — HEPARIN SODIUM 10000 [USP'U]/ML
5000 INJECTION INTRAVENOUS; SUBCUTANEOUS EVERY 12 HOURS
Refills: 0 | Status: DISCONTINUED | OUTPATIENT
Start: 2024-09-29 | End: 2024-09-29

## 2024-09-29 RX ORDER — NIFEDIPINE 30 MG
60 TABLET, EXTENDED RELEASE 24 HR ORAL DAILY
Refills: 0 | Status: CANCELLED | OUTPATIENT
Start: 2024-09-30 | End: 2024-09-29

## 2024-09-29 RX ORDER — NIFEDIPINE 30 MG
30 TABLET, EXTENDED RELEASE 24 HR ORAL ONCE
Refills: 0 | Status: COMPLETED | OUTPATIENT
Start: 2024-09-29 | End: 2024-09-29

## 2024-09-29 RX ORDER — ACETAMINOPHEN 500 MG/5ML
1000 LIQUID (ML) ORAL ONCE
Refills: 0 | Status: COMPLETED | OUTPATIENT
Start: 2024-09-29 | End: 2024-09-29

## 2024-09-29 RX ORDER — NIFEDIPINE 90 MG
30 TABLET, EXTENDED RELEASE 24 HR ORAL EVERY 24 HOURS
Refills: 0 | Status: DISCONTINUED | OUTPATIENT
Start: 2024-09-29 | End: 2024-11-01

## 2024-09-29 RX ORDER — ASPIRIN/MAG CARB/ALUMINUM AMIN 325 MG
81 TABLET ORAL DAILY
Refills: 0 | Status: DISCONTINUED | OUTPATIENT
Start: 2024-09-29 | End: 2024-09-29

## 2024-09-29 RX ORDER — MAGNESIUM SULFATE 500 MG/ML
2 SYRINGE (ML) INJECTION
Qty: 40 | Refills: 0 | Status: DISCONTINUED | OUTPATIENT
Start: 2024-09-29 | End: 2024-09-29

## 2024-09-29 RX ORDER — SODIUM CHLORIDE 9 G/1000ML
1000 INJECTION, SOLUTION INTRAVENOUS
Refills: 0 | Status: DISCONTINUED | OUTPATIENT
Start: 2024-09-29 | End: 2024-09-29

## 2024-09-29 RX ORDER — ACETAMINOPHEN 500 MG
975 TABLET ORAL EVERY 6 HOURS
Refills: 0 | Status: DISCONTINUED | OUTPATIENT
Start: 2024-09-29 | End: 2024-11-01

## 2024-09-29 RX ORDER — MAGNESIUM SULFATE 500 MG/ML
4 SYRINGE (ML) INJECTION ONCE
Refills: 0 | Status: COMPLETED | OUTPATIENT
Start: 2024-09-29 | End: 2024-09-29

## 2024-09-29 RX ORDER — LABETALOL HYDROCHLORIDE 200 MG/1
20 TABLET, FILM COATED ORAL ONCE
Refills: 0 | Status: COMPLETED | OUTPATIENT
Start: 2024-09-29 | End: 2024-09-29

## 2024-09-29 RX ORDER — BETAMETHASONE SODIUM PHOSPHATE AND BETAMETHASONE ACETATE 3; 3 MG/ML; MG/ML
12 INJECTION, SUSPENSION INTRA-ARTICULAR; INTRALESIONAL; INTRAMUSCULAR ONCE
Refills: 0 | Status: COMPLETED | OUTPATIENT
Start: 2024-09-30 | End: 2024-09-30

## 2024-09-29 RX ADMIN — Medication 400 MILLIGRAM(S): at 01:13

## 2024-09-29 RX ADMIN — Medication 50 GM/HR: at 21:12

## 2024-09-29 RX ADMIN — Medication 30 MILLIGRAM(S): at 03:02

## 2024-09-29 RX ADMIN — Medication 300 GRAM(S): at 00:55

## 2024-09-29 RX ADMIN — Medication 50 GM/HR: at 01:16

## 2024-09-29 RX ADMIN — Medication 12 MILLIGRAM(S): at 01:07

## 2024-09-29 RX ADMIN — Medication 50 GM/HR: at 10:08

## 2024-09-29 RX ADMIN — SODIUM CHLORIDE 75 MILLILITER(S): 9 INJECTION, SOLUTION INTRAVENOUS at 07:30

## 2024-09-29 RX ADMIN — Medication 1000 MILLIGRAM(S): at 02:15

## 2024-09-29 RX ADMIN — LABETALOL HYDROCHLORIDE 20 MILLIGRAM(S): 200 TABLET, FILM COATED ORAL at 00:54

## 2024-09-29 RX ADMIN — Medication 30 MILLIGRAM(S): at 07:39

## 2024-09-29 RX ADMIN — Medication 975 MILLIGRAM(S): at 11:01

## 2024-09-29 NOTE — OB PROVIDER H&P - NSHPLABSRESULTS_GEN_ALL_CORE
12.6   10.27 )-----------( 264      ( 29 Sep 2024 11:09 )             38.0   09-29-24 @ 11:09    139  |  106  |  6[L]             --------------------------< 138[H]     3.9  |  18[L]  | 0.38[L]    eGFR AA: --  eGFR N-AA: --    Calcium: 8.4  Phosphorus: --  Magnesium: --    AST: 15    ALT: 12  AlkPhos: 192[H]  Protein: 7.1  Albumin: 3.9  TBili: 0.2  D-Bili: --  09-29-24 @ 06:55    --  |  --  |  --             --------------------------< --     --  |  --  | --    eGFR AA: --  eGFR N-AA: --    Calcium: --  Phosphorus: --  Magnesium: 4.0[H]    AST: --    ALT: --  AlkPhos: --  Protein: --  Albumin: --  TBili: --  D-Bili: --  09-29-24 @ 00:30    134[L]  |  99  |  7.0[L]             --------------------------< 69[L]     3.7  |  17.0[L]  | 0.32[L]    eGFR AA: --  eGFR N-AA: --    Calcium: 9.3  Phosphorus: --  Magnesium: --    AST: 29    ALT: 12  AlkPhos: 188[H]  Protein: 6.5[L]  Albumin: 3.6  TBili: 0.2[L]  D-Bili: --    Urinalysis Basic - ( 29 Sep 2024 11:09 )    Color: x / Appearance: x / SG: x / pH: x  Gluc: 138 mg/dL / Ketone: x  / Bili: x / Urobili: x   Blood: x / Protein: x / Nitrite: x   Leuk Esterase: x / RBC: x / WBC x   Sq Epi: x / Non Sq Epi: x / Bacteria: x

## 2024-09-29 NOTE — OB PROVIDER H&P - CURRENT PREGNANCY COMPLICATIONS, OB PROFILE
Preeclampsia/Gestational Age less than 36 Weeks/Hypertensive Disorder/Intrauterine Growth Restriction/Maternal Unknown GBS/Asthma

## 2024-09-29 NOTE — CONSULT NOTE PEDS - SUBJECTIVE AND OBJECTIVE BOX
Ms. Martinez is a 23 y/o  with di-di twin pregnancy at 29w 6d admitted due to concern for sPEC. No significant maternal medical history. Prenatal course notable for twin gestation, pre-eclampsia on procardia daily and breech. Most recent EFWs are: Twin A- 1183g (23%ile) and Twin B- 701g (1%ile) on 24.    I met with Ms. Martinez to discussed what to expect should she deliver at 29 weeks gestation.    1. The NICU team will be present at her delivery and will immediately assess and care for her infants.    2. The infants will likely require respiratory support, most commonly in the form of nasal CPAP. The outcomes improve after  steroids. Some infants at this gestational age may require intubation and mechanical ventilation.    3. Depending on the clinical status of the infants, enteral feedings will likely not be started immediately. IV nutrition in the form of TPN would be provided via umbilical line or PICC until the infants are able to tolerate full enteral feedings. Due to immature suck/swallow, they may require an orogastric tube once feeds are initiated. They are also at risk for hypoglycemia.    4. Discussed the benefits of breastfeeding in  infants and encouraged mother to pump following delivery.    5. Due to prematurity, the infants will be at increased risk for infection and would likely be started on antibiotics after birth. They will be screened for infections following delivery and may require other courses of antibiotics during their hospital course if an infection were suspected. If they show signs and symptoms of feeding intolerance, feedings may be held, and the infants may require evaluation for intestinal infection (necrotizing enterocolitis).    6. Risk of symptomatic PDA discussed with possible need for medical vs procedural closure.    7. Risk of intraventricular hemorrhage (IVH) and possible consequences discussed.    8. Retinopathy of prematurity (ROP) risk discussed along with close follow up with ophthalmologist. If ROP is significant, medical or surgical treatment may be required.    9. Elevated risk of jaundice and possible requirement for phototherapy discussed.    10. The infants will be at risk for developmental delays as a consequence of prematurity. They will be evaluated by a developmental pediatrician to monitor for neurodevelopmental delays.    11. Thermoregulation issues and need to be in an isolette with slow weaning to a crib discussed.    12. Length of stay is highly variable, but at this gestational age, average stay is approximately 48 days. Weight-based estimates may project a slightly shorter average stay, however this is very individualized. Furthermore, clinical course may vary between the twins. Discussed discharged criteria.    Ms. Martinez had the chance to ask any questions and was encouraged to contact the NICU at that time if additional questions arise.    Thank you for the opportunity to participate in the care of this patient and please inform us of any changes in her status. Ms. Martinez is a 25 y/o  with di-di twin pregnancy at 29w 6d admitted due to concern for sPEC. No significant maternal medical history. Prenatal course notable for twin gestation, pre-eclampsia on procardia daily and breech. Most recent EFWs are: Twin A- 1183g (23%ile) and Twin B- 701g (1%ile) on 24.    I met with Ms. Martinez and her partner to discussed what to expect should she deliver at 29 weeks gestation.    1. The NICU team will be present at her delivery and will immediately assess and care for her infants.    2. The infants will likely require respiratory support, most commonly in the form of nasal CPAP. The outcomes improve after  steroids. Some infants at this gestational age may require intubation and mechanical ventilation.    3. Depending on the clinical status of the infants, enteral feedings will likely not be started immediately. IV nutrition in the form of TPN would be provided via umbilical line or PICC until the infants are able to tolerate full enteral feedings. Due to immature suck/swallow, they may require an orogastric tube once feeds are initiated. They are also at risk for hypoglycemia.    4. Discussed the benefits of breastfeeding in  infants and encouraged mother to pump following delivery.    5. Due to prematurity, the infants will be at increased risk for infection and would likely be started on antibiotics after birth. They will be screened for infections following delivery and may require other courses of antibiotics during their hospital course if an infection were suspected. If they show signs and symptoms of feeding intolerance, feedings may be held, and the infants may require evaluation for intestinal infection (necrotizing enterocolitis).    6. Risk of symptomatic PDA discussed with possible need for medical vs procedural closure.    7. Risk of intraventricular hemorrhage (IVH) and possible consequences discussed.    8. Retinopathy of prematurity (ROP) risk discussed along with close follow up with ophthalmologist. If ROP is significant, medical or surgical treatment may be required.    9. Elevated risk of jaundice and possible requirement for phototherapy discussed.    10. The infants will be at risk for developmental delays as a consequence of prematurity. They will be evaluated by a developmental pediatrician to monitor for neurodevelopmental delays.    11. Thermoregulation issues and need to be in an isolette with slow weaning to a crib discussed.    12. Length of stay is highly variable, but at this gestational age, average stay is approximately 48 days. Weight-based estimates may project a slightly shorter average stay, however this is very individualized. Furthermore, clinical course may vary between the twins. Discussed discharged criteria.    Ms. Martinez and her partner had the chance to ask any questions and was encouraged to contact the NICU at that time if additional questions arise.    Thank you for the opportunity to participate in the care of this patient and please inform us of any changes in her status.

## 2024-09-29 NOTE — OB PROVIDER H&P - NSHPPHYSICALEXAM_GEN_ALL_CORE
Vital Signs Last 24 Hrs  T(C): 37.2 (29 Sep 2024 11:04), Max: 37.2 (29 Sep 2024 09:58)  T(F): 99 (29 Sep 2024 11:04), Max: 99 (29 Sep 2024 11:04)  HR: 116 (29 Sep 2024 12:10) (71 - 125)  BP: 128/70 (29 Sep 2024 12:02) (115/69 - 179/103)  BP(mean): --  ABP: --  ABP(mean): --  RR: 18 (29 Sep 2024 11:04) (16 - 18)  SpO2: 98% (29 Sep 2024 12:10) (96% - 100%)    Exam:  GA: NAD  CV: RR  PULM: nonlabored breathing  Abd: soft, nontender, gravid, no palpable contractions  VE: deferred    EFH:  A: baseline 140, moderate variability, +accels, -decels  B: baseline 130, moderate variability, +accels, -decels  [place]: not [place]im[place]

## 2024-09-29 NOTE — OB PROVIDER H&P - ASSESSMENT
24y  at 29w6d GA by LMP admitted as a transfer from [facility] due to sPEC in the setting of FGR of baby B at 29w. Pt is s/p Labetalol 20 IVP x1 at [facility] and has not required any more short acting antihypertensive medications. She continues on Mg, Procardia 30XL and BMZ. Pt is asymptomatic and fetal statuses of both A and B are reassuring.     #sPEC  - HELLP labs sent  - s/p Lab 20  - Continue Procardia 30XL daily  - Continue Mg    #Fetal wellbeing  - continuous monitoring for now, switch to BID once moved to antepartum floor  - PNV  - BMZ (-)  - NICU consult placed  - GBS swab sent    #Maternal wellbeing  - T&S sent  - regular diet once moved to antepartum floor  - Mg level q6h while on Mg  - HSQ for DVT prophylaxis once moved to antepartum floor    D/w Dr. Jose David Shipman PGY3 24y  at 29w6d GA by LMP admitted as a transfer from [facility] due to sPEC in the setting of FGR of baby B at 29w. Pt is s/p Labetalol 20 IVP x1 at [facility] and has not required any more short acting antihypertensive medications. She continues on Mg, Procardia 30XL and BMZ. Pt is asymptomatic and fetal statuses of both A and B are reassuring.     #sPEC  - HELLP labs sent  - s/p Lab 20  - Continue Procardia 30XL daily  - Continue Mg  - continue aspirin    #Fetal wellbeing  - continuous monitoring for now, switch to BID once moved to antepartum floor  - PNV  - BMZ (-)  - NICU consult placed  - GBS swab sent    #Maternal wellbeing  - T&S sent  - regular diet once moved to antepartum floor  - Mg level q6h while on Mg  - HSQ for DVT prophylaxis once moved to antepartum floor    D/w Dr. Jose David Shipman PGY3

## 2024-09-29 NOTE — OB RN PATIENT PROFILE - AS SC BRADEN SENSORY
Partially impaired: cannot see medication labels or newsprint, but can see obstacles in path, and the surrounding layout; can count fingers at arm's length
(4) no impairment

## 2024-09-29 NOTE — OB PROVIDER H&P - HISTORY OF PRESENT ILLNESS
24y  at 29w6d GA by LMP who presents as a transfer from [facility] for pre-eclampsia with severe features. Pt initially presented to [facility] with abdominal pain and a headache of 5/10. She had severe range BP requiring Labetalol 20mg IVP and was diagnosed with sPEC. She was started on Procardia 30mg XL daily, Mg and BMZ. Pt was then transferred for a higher level of NICU care. Upon arrival, pt reports feeling well. She denies vaginal bleeding, contractions and leakage of fluid. She endorses good fetal movement. Denies headache, vision changes, RUQ/epigastric pain.    Prenatal course is significant for:  Di/di twin gestation.  Severe IUGR of fetus B (<1%, UAD wnl)  PEC w/ SF    Most recent ATU sono :   Fetus A: breech inferior maternal R, anterior placenta, MVP 4.8, BPP 8/8, UAD wnl  Fetus B: breech superior maternal L, anterior placenta, MVP 4.44, BPP 8/8, UAD wnl    Most recent growth :  Fetus A: breech inferior maternal R, anterior placenta, EFW 1183g 23%, AC 24%, UAD wnl, BPP 8/8  Fetus B: breech superior maternal L, anterior placenta, EFW 701g <1%, AC <1%, UAD wnl, BPP 8/8    POB:   PGYN: h/o PCOS. H/o abnormal pap smears s/p colpo/bioposy wnl. -fibroids,  denies STD hx  PMH: h/o childhood asthma, last use of albuterol in . She was hospitalized for RSV in  but never intubated.  PSH: Denies  SH: Denies EtOH, tobacco and illicit drug use during this pregnancy; feels safe at home   Meds: PNVs, bASA  Allergies: NKDA

## 2024-09-29 NOTE — OB PROVIDER H&P - ATTENDING COMMENTS
MFM Attending  Transfer for [facility]    24y  at 29w6d GA by LMP admitted as a transfer from [facility] due to preeclampsiawith severe features  DC/DA twin pregnancy complicated by FGR  twin B, normal umbilical artery Doppler   s/p Labetalol 20 IVP x1 at [facility]   Magnesium sulfate for seizure prophylaxis  ,Procardia 30XL   s/p betamethasone , second dose tomorrow      Continue OBS/BR  NICU consult  GBS culture sent  2nd dose of betamethasone tomorrow    Myrna Main MD, MPH

## 2024-09-30 ENCOUNTER — APPOINTMENT (OUTPATIENT)
Dept: ANTEPARTUM | Facility: CLINIC | Age: 24
End: 2024-09-30

## 2024-09-30 ENCOUNTER — ASOB RESULT (OUTPATIENT)
Age: 24
End: 2024-09-30

## 2024-09-30 PROBLEM — J45.909 UNSPECIFIED ASTHMA, UNCOMPLICATED: Chronic | Status: ACTIVE | Noted: 2024-09-29

## 2024-09-30 PROBLEM — L30.9 DERMATITIS, UNSPECIFIED: Chronic | Status: ACTIVE | Noted: 2024-09-29

## 2024-09-30 LAB
ALBUMIN SERPL ELPH-MCNC: 3.8 G/DL — SIGNIFICANT CHANGE UP (ref 3.3–5)
ALP SERPL-CCNC: 177 U/L — HIGH (ref 40–120)
ALT FLD-CCNC: 17 U/L — SIGNIFICANT CHANGE UP (ref 10–45)
ANION GAP SERPL CALC-SCNC: 15 MMOL/L — SIGNIFICANT CHANGE UP (ref 5–17)
AST SERPL-CCNC: 17 U/L — SIGNIFICANT CHANGE UP (ref 10–40)
BASOPHILS # BLD AUTO: 0.01 K/UL — SIGNIFICANT CHANGE UP (ref 0–0.2)
BASOPHILS NFR BLD AUTO: 0.1 % — SIGNIFICANT CHANGE UP (ref 0–2)
BILIRUB SERPL-MCNC: 0.2 MG/DL — SIGNIFICANT CHANGE UP (ref 0.2–1.2)
BUN SERPL-MCNC: 6 MG/DL — LOW (ref 7–23)
CALCIUM SERPL-MCNC: 9.1 MG/DL — SIGNIFICANT CHANGE UP (ref 8.4–10.5)
CHLORIDE SERPL-SCNC: 105 MMOL/L — SIGNIFICANT CHANGE UP (ref 96–108)
CO2 SERPL-SCNC: 19 MMOL/L — LOW (ref 22–31)
CREAT SERPL-MCNC: 0.43 MG/DL — LOW (ref 0.5–1.3)
EGFR: 137 ML/MIN/1.73M2 — SIGNIFICANT CHANGE UP
EOSINOPHIL # BLD AUTO: 0 K/UL — SIGNIFICANT CHANGE UP (ref 0–0.5)
EOSINOPHIL NFR BLD AUTO: 0 % — SIGNIFICANT CHANGE UP (ref 0–6)
GLUCOSE SERPL-MCNC: 156 MG/DL — HIGH (ref 70–99)
GROUP B BETA STREP DNA (PCR): SIGNIFICANT CHANGE UP
HCT VFR BLD CALC: 35.1 % — SIGNIFICANT CHANGE UP (ref 34.5–45)
HGB BLD-MCNC: 11.7 G/DL — SIGNIFICANT CHANGE UP (ref 11.5–15.5)
IMM GRANULOCYTES NFR BLD AUTO: 1 % — HIGH (ref 0–0.9)
LDH SERPL L TO P-CCNC: 170 U/L — SIGNIFICANT CHANGE UP (ref 50–242)
LYMPHOCYTES # BLD AUTO: 0.71 K/UL — LOW (ref 1–3.3)
LYMPHOCYTES # BLD AUTO: 6.4 % — LOW (ref 13–44)
MCHC RBC-ENTMCNC: 28.1 PG — SIGNIFICANT CHANGE UP (ref 27–34)
MCHC RBC-ENTMCNC: 33.3 GM/DL — SIGNIFICANT CHANGE UP (ref 32–36)
MCV RBC AUTO: 84.4 FL — SIGNIFICANT CHANGE UP (ref 80–100)
MONOCYTES # BLD AUTO: 0.49 K/UL — SIGNIFICANT CHANGE UP (ref 0–0.9)
MONOCYTES NFR BLD AUTO: 4.4 % — SIGNIFICANT CHANGE UP (ref 2–14)
NEUTROPHILS # BLD AUTO: 9.78 K/UL — HIGH (ref 1.8–7.4)
NEUTROPHILS NFR BLD AUTO: 88.1 % — HIGH (ref 43–77)
NRBC # BLD: 0 /100 WBCS — SIGNIFICANT CHANGE UP (ref 0–0)
PLATELET # BLD AUTO: 261 K/UL — SIGNIFICANT CHANGE UP (ref 150–400)
POTASSIUM SERPL-MCNC: 4.1 MMOL/L — SIGNIFICANT CHANGE UP (ref 3.5–5.3)
POTASSIUM SERPL-SCNC: 4.1 MMOL/L — SIGNIFICANT CHANGE UP (ref 3.5–5.3)
PROT SERPL-MCNC: 6.8 G/DL — SIGNIFICANT CHANGE UP (ref 6–8.3)
RBC # BLD: 4.16 M/UL — SIGNIFICANT CHANGE UP (ref 3.8–5.2)
RBC # FLD: 13.1 % — SIGNIFICANT CHANGE UP (ref 10.3–14.5)
SODIUM SERPL-SCNC: 139 MMOL/L — SIGNIFICANT CHANGE UP (ref 135–145)
SOURCE GROUP B STREP: SIGNIFICANT CHANGE UP
URATE SERPL-MCNC: 3.9 MG/DL — SIGNIFICANT CHANGE UP (ref 2.5–7)
WBC # BLD: 11.1 K/UL — HIGH (ref 3.8–10.5)
WBC # FLD AUTO: 11.1 K/UL — HIGH (ref 3.8–10.5)

## 2024-09-30 PROCEDURE — 99232 SBSQ HOSP IP/OBS MODERATE 35: CPT

## 2024-09-30 RX ORDER — HEPARIN SODIUM 10000 [USP'U]/ML
5000 INJECTION INTRAVENOUS; SUBCUTANEOUS EVERY 12 HOURS
Refills: 0 | Status: DISCONTINUED | OUTPATIENT
Start: 2024-09-30 | End: 2024-10-22

## 2024-09-30 RX ADMIN — Medication 30 MILLIGRAM(S): at 05:14

## 2024-09-30 RX ADMIN — BETAMETHASONE SODIUM PHOSPHATE AND BETAMETHASONE ACETATE 12 MILLIGRAM(S): 3; 3 INJECTION, SUSPENSION INTRA-ARTICULAR; INTRALESIONAL; INTRAMUSCULAR at 01:11

## 2024-09-30 RX ADMIN — HEPARIN SODIUM 5000 UNIT(S): 10000 INJECTION INTRAVENOUS; SUBCUTANEOUS at 21:33

## 2024-09-30 RX ADMIN — HEPARIN SODIUM 5000 UNIT(S): 10000 INJECTION INTRAVENOUS; SUBCUTANEOUS at 09:12

## 2024-09-30 NOTE — PROGRESS NOTE ADULT - ASSESSMENT
24y  at 30w0d GA by LMP admitted as a transfer from [facility] due to sPEC in the setting of severe FGR of baby B. Pt is s/p Labetalol 20 IVP x1 at [facility] and has not required any more short acting antihypertensive medications. Pt is asymptomatic this morning and had no overnight events.    #sPEC  - BP overnight 110-140/70-80  - HELLP wnl  - s/p Mg x24h  - s/p Lab 20  - Continue Procardia 30XL daily  - continue aspirin    #Fetal wellbeing  - NST BID  - PNV  - BMZ (-)  - s/p NICU consult   - f/u GBS (swab sent )    #Maternal wellbeing  - T&S q3d  - regular diet   - HSQ for DVT prophylaxis      Maureen Shipman PGY3

## 2024-09-30 NOTE — PROGRESS NOTE ADULT - SUBJECTIVE AND OBJECTIVE BOX
PGY 3 Antepartum Note    Patient seen and examined at bedside in NAD. Denies any CTX, LOF or VB.  Reports good fetal movement. Denies headache, vision changes, RUQ/epigastric pain.    T(F): 98.8 (05:29)  HR: 111 (05:29)  BP: 116/68 (05:29)  RR: 18 (05:29)    Gen: NAD  Cardio: rrr, s1/s2  Pulm: ca b/l  Abd: gravid, nontender, no palpable contractions  Ext: no edema,  no calf tenderness  SVE: deferred    Medications:    betamethasone Injectable: 12 milliGRAM(s) (09-30-24 @ 01:11)  magnesium sulfate Infusion: 50 mL/Hr (09-29-24 @ 10:09),  50 mL/Hr (09-29-24 @ 10:04)  NIFEdipine XL: 30 milliGRAM(s) (09-30-24 @ 05:14)      Labs:                        12.6   10.27 )-----------( 264      ( 29 Sep 2024 11:09 )             38.0     09-29    139  |  106  |  6[L]  ----------------------------<  138[H]  3.9   |  18[L]  |  0.38[L]    Ca    8.4      29 Sep 2024 11:09  Mg     5.2     09-29    TPro  7.1  /  Alb  3.9  /  TBili  0.2  /  DBili  x   /  AST  15  /  ALT  12  /  AlkPhos  192[H]  09-29      Urinalysis Basic - ( 29 Sep 2024 11:09 )    Color: x / Appearance: x / SG: x / pH: x  Gluc: 138 mg/dL / Ketone: x  / Bili: x / Urobili: x   Blood: x / Protein: x / Nitrite: x   Leuk Esterase: x / RBC: x / WBC x   Sq Epi: x / Non Sq Epi: x / Bacteria: x

## 2024-10-01 ENCOUNTER — APPOINTMENT (OUTPATIENT)
Dept: ANTEPARTUM | Facility: CLINIC | Age: 24
End: 2024-10-01

## 2024-10-01 PROCEDURE — 99232 SBSQ HOSP IP/OBS MODERATE 35: CPT

## 2024-10-01 RX ADMIN — Medication 30 MILLIGRAM(S): at 05:17

## 2024-10-01 RX ADMIN — HEPARIN SODIUM 5000 UNIT(S): 10000 INJECTION INTRAVENOUS; SUBCUTANEOUS at 09:13

## 2024-10-01 RX ADMIN — HEPARIN SODIUM 5000 UNIT(S): 10000 INJECTION INTRAVENOUS; SUBCUTANEOUS at 20:57

## 2024-10-01 NOTE — PROGRESS NOTE ADULT - NSPROGADDITIONALINFOA_GEN_ALL_CORE
24 y.o. P0 at 30w0d w/ di-di twins c/b FGR of baby B (EFW <1%, AC <1% w/ normal dopplers) admitted for preeclampsia with severe features. Met criteria with severe range blood pressure requiring IV hypertensive. Today, patient reports no maternal or fetal complaints. Blood pressures 120-140s/80s on Nifedipine 30mg. No serum abnormalities on labs. This morning with NST reassuring x2. S/p BMZ - and NICU. Last scan with breech/breech presentation. Consented for primary . Continue in house monitoring with delivery at 34w0d or sooner if worsening uncontrolled blood pressures, end organ damage, or non-reassuring fetal status.     Seen and d/w Dr. Altagracia Bear, ROMERO Fellow

## 2024-10-01 NOTE — PROGRESS NOTE ADULT - ASSESSMENT
24y  at 30w0d GA by LMP admitted as a transfer from [facility] due to sPEC in the setting of severe FGR of baby B. Pt is s/p Labetalol 20 IVP x1 at [facility] and has not required any more short acting antihypertensive medications. Pt is asymptomatic this morning and had no overnight events.    #sPEC  - BP overnight 120-140/70-80  - HELLP q3d  - s/p Mg x24h  - s/p Lab 20  - Continue Procardia 30XL daily  - continue aspirin    #Fetal wellbeing  - NST BID  - PNV  - BMZ (-)  - s/p NICU consult   - f/u GBS (swab sent )    #Maternal wellbeing  - T&S q3d  - regular diet   - HSQ for DVT prophylaxis      Maureen Shipman PGY3

## 2024-10-01 NOTE — PROGRESS NOTE ADULT - SUBJECTIVE AND OBJECTIVE BOX
PGY 3 Antepartum Note    Patient seen and examined at bedside in NAD. Denies any CTX, LOF or VB.  Reports good fetal movement of both babies. Denies headache, vision changes, RUQ/epigastric pain    T(F): 98.6 (05:15)  HR: 97 (05:15)  BP: 129/84 (05:15)  RR: 18 (05:15)      Gen: NAD  Cardio: rrr, s1/s2  Pulm: ca b/l  Abd: gravid, nontender, no palpable contractions  Ext: no edema,  no calf tenderness  SVE: deferred    Medications:    betamethasone Injectable: 12 milliGRAM(s) (09-30-24 @ 01:11)  heparin   Injectable: 5000 Unit(s) (09-30-24 @ 21:33),  5000 Unit(s) (09-30-24 @ 09:12)  magnesium sulfate Infusion: 50 mL/Hr (09-29-24 @ 10:09),  50 mL/Hr (09-29-24 @ 10:04)  NIFEdipine XL: 30 milliGRAM(s) (10-01-24 @ 05:17),  30 milliGRAM(s) (09-30-24 @ 05:14)      Labs:                        11.7   11.10 )-----------( 261      ( 30 Sep 2024 10:39 )             35.1     09-30    139  |  105  |  6[L]  ----------------------------<  156[H]  4.1   |  19[L]  |  0.43[L]    Ca    9.1      30 Sep 2024 10:39  Mg     5.2     09-29    TPro  6.8  /  Alb  3.8  /  TBili  0.2  /  DBili  x   /  AST  17  /  ALT  17  /  AlkPhos  177[H]  09-30      Urinalysis Basic - ( 30 Sep 2024 10:39 )    Color: x / Appearance: x / SG: x / pH: x  Gluc: 156 mg/dL / Ketone: x  / Bili: x / Urobili: x   Blood: x / Protein: x / Nitrite: x   Leuk Esterase: x / RBC: x / WBC x   Sq Epi: x / Non Sq Epi: x / Bacteria: x

## 2024-10-02 LAB
ALBUMIN SERPL ELPH-MCNC: 4.1 G/DL — SIGNIFICANT CHANGE UP (ref 3.3–5)
ALP SERPL-CCNC: 175 U/L — HIGH (ref 40–120)
ALT FLD-CCNC: 12 U/L — SIGNIFICANT CHANGE UP (ref 10–45)
ANION GAP SERPL CALC-SCNC: 14 MMOL/L — SIGNIFICANT CHANGE UP (ref 5–17)
AST SERPL-CCNC: 18 U/L — SIGNIFICANT CHANGE UP (ref 10–40)
BASOPHILS # BLD AUTO: 0.04 K/UL — SIGNIFICANT CHANGE UP (ref 0–0.2)
BASOPHILS NFR BLD AUTO: 0.4 % — SIGNIFICANT CHANGE UP (ref 0–2)
BILIRUB SERPL-MCNC: 0.3 MG/DL — SIGNIFICANT CHANGE UP (ref 0.2–1.2)
BLD GP AB SCN SERPL QL: NEGATIVE — SIGNIFICANT CHANGE UP
BUN SERPL-MCNC: 11 MG/DL — SIGNIFICANT CHANGE UP (ref 7–23)
CALCIUM SERPL-MCNC: 9.3 MG/DL — SIGNIFICANT CHANGE UP (ref 8.4–10.5)
CHLORIDE SERPL-SCNC: 104 MMOL/L — SIGNIFICANT CHANGE UP (ref 96–108)
CO2 SERPL-SCNC: 18 MMOL/L — LOW (ref 22–31)
CREAT SERPL-MCNC: 0.41 MG/DL — LOW (ref 0.5–1.3)
EGFR: 139 ML/MIN/1.73M2 — SIGNIFICANT CHANGE UP
EOSINOPHIL # BLD AUTO: 0.07 K/UL — SIGNIFICANT CHANGE UP (ref 0–0.5)
EOSINOPHIL NFR BLD AUTO: 0.7 % — SIGNIFICANT CHANGE UP (ref 0–6)
GLUCOSE SERPL-MCNC: 80 MG/DL — SIGNIFICANT CHANGE UP (ref 70–99)
HCT VFR BLD CALC: 38.4 % — SIGNIFICANT CHANGE UP (ref 34.5–45)
HGB BLD-MCNC: 12.7 G/DL — SIGNIFICANT CHANGE UP (ref 11.5–15.5)
IMM GRANULOCYTES NFR BLD AUTO: 0.7 % — SIGNIFICANT CHANGE UP (ref 0–0.9)
LDH SERPL L TO P-CCNC: 164 U/L — SIGNIFICANT CHANGE UP (ref 50–242)
LYMPHOCYTES # BLD AUTO: 1.99 K/UL — SIGNIFICANT CHANGE UP (ref 1–3.3)
LYMPHOCYTES # BLD AUTO: 19 % — SIGNIFICANT CHANGE UP (ref 13–44)
MCHC RBC-ENTMCNC: 28.2 PG — SIGNIFICANT CHANGE UP (ref 27–34)
MCHC RBC-ENTMCNC: 33.1 GM/DL — SIGNIFICANT CHANGE UP (ref 32–36)
MCV RBC AUTO: 85.1 FL — SIGNIFICANT CHANGE UP (ref 80–100)
MONOCYTES # BLD AUTO: 1.01 K/UL — HIGH (ref 0–0.9)
MONOCYTES NFR BLD AUTO: 9.7 % — SIGNIFICANT CHANGE UP (ref 2–14)
NEUTROPHILS # BLD AUTO: 7.27 K/UL — SIGNIFICANT CHANGE UP (ref 1.8–7.4)
NEUTROPHILS NFR BLD AUTO: 69.5 % — SIGNIFICANT CHANGE UP (ref 43–77)
NRBC # BLD: 0 /100 WBCS — SIGNIFICANT CHANGE UP (ref 0–0)
PLATELET # BLD AUTO: 255 K/UL — SIGNIFICANT CHANGE UP (ref 150–400)
POTASSIUM SERPL-MCNC: 3.6 MMOL/L — SIGNIFICANT CHANGE UP (ref 3.5–5.3)
POTASSIUM SERPL-SCNC: 3.6 MMOL/L — SIGNIFICANT CHANGE UP (ref 3.5–5.3)
PROT SERPL-MCNC: 7 G/DL — SIGNIFICANT CHANGE UP (ref 6–8.3)
RBC # BLD: 4.51 M/UL — SIGNIFICANT CHANGE UP (ref 3.8–5.2)
RBC # FLD: 12.8 % — SIGNIFICANT CHANGE UP (ref 10.3–14.5)
RH IG SCN BLD-IMP: POSITIVE — SIGNIFICANT CHANGE UP
SODIUM SERPL-SCNC: 136 MMOL/L — SIGNIFICANT CHANGE UP (ref 135–145)
URATE SERPL-MCNC: 3.6 MG/DL — SIGNIFICANT CHANGE UP (ref 2.5–7)
WBC # BLD: 10.45 K/UL — SIGNIFICANT CHANGE UP (ref 3.8–10.5)
WBC # FLD AUTO: 10.45 K/UL — SIGNIFICANT CHANGE UP (ref 3.8–10.5)

## 2024-10-02 PROCEDURE — 99232 SBSQ HOSP IP/OBS MODERATE 35: CPT

## 2024-10-02 RX ADMIN — HEPARIN SODIUM 5000 UNIT(S): 10000 INJECTION INTRAVENOUS; SUBCUTANEOUS at 09:16

## 2024-10-02 RX ADMIN — HEPARIN SODIUM 5000 UNIT(S): 10000 INJECTION INTRAVENOUS; SUBCUTANEOUS at 21:23

## 2024-10-02 RX ADMIN — Medication 30 MILLIGRAM(S): at 06:14

## 2024-10-02 NOTE — PROGRESS NOTE ADULT - ASSESSMENT
24y  at 30w1d GA by LMP admitted as a transfer from [facility] due to sPEC in the setting of severe FGR of baby B. Pt is s/p Labetalol 20 IVP x1 at [facility] and has not required any more short acting antihypertensive medications. Pt is asymptomatic this morning and had no overnight events.    #sPEC  - BP overnight 120/70s  - HELLP q3d  - s/p Mg x24h  - s/p Lab 20  - Continue Procardia 30XL daily  - continue aspirin    #Fetal wellbeing  -ATU(): A: beech inferior maternal R, anterior placenta, MVP 6, BPP 8/8, UAD wnl. Fetus B: breech superior maternal L, anterior placenta, MVP 6.3, BPP 8/8, UAD wnl  - NST BID  - PNV  - BMZ (-)  - s/p NICU consult   - f/u GBS (swab sent )    #Maternal wellbeing  - T&S q3d  - regular diet   - HSQ for DVT prophylaxis      Maureen Shipman PGY3

## 2024-10-02 NOTE — PROGRESS NOTE ADULT - SUBJECTIVE AND OBJECTIVE BOX
PGY 3 Antepartum Note    Patient seen and examined at bedside in NAD. Denies any CTX, LOF or VB.  Reports good fetal movement of both babies. Denies headache, vision changes, RUQ/epigastric pain      T(F): 98.3 (06:00)  HR: 87 (06:00)  BP: 132/81 (06:00)  RR: 18 (06:00)      Gen: NAD  Cardio: rrr, s1/s2  Pulm: ca b/l  Abd: gravid, nontender, no palpable contractions  Ext: no edema,  no calf tenderness  SVE: deferred    Medications:    betamethasone Injectable: 12 milliGRAM(s) (09-30-24 @ 01:11)  heparin   Injectable: 5000 Unit(s) (10-01-24 @ 20:57),  5000 Unit(s) (10-01-24 @ 09:13),  5000 Unit(s) (09-30-24 @ 21:33),  5000 Unit(s) (09-30-24 @ 09:12)  magnesium sulfate Infusion: 50 mL/Hr (09-29-24 @ 10:09),  50 mL/Hr (09-29-24 @ 10:04)  NIFEdipine XL: 30 milliGRAM(s) (10-02-24 @ 06:14),  30 milliGRAM(s) (10-01-24 @ 05:17),  30 milliGRAM(s) (09-30-24 @ 05:14)      Labs:                        12.7   10.45 )-----------( 255      ( 02 Oct 2024 06:38 )             38.4     10-02    136  |  104  |  11  ----------------------------<  80  3.6   |  18[L]  |  0.41[L]    Ca    9.3      02 Oct 2024 06:38    TPro  7.0  /  Alb  x   /  TBili  0.3  /  DBili  x   /  AST  18  /  ALT  12  /  AlkPhos  175[H]  10-02      Urinalysis Basic - ( 02 Oct 2024 06:38 )    Color: x / Appearance: x / SG: x / pH: x  Gluc: 80 mg/dL / Ketone: x  / Bili: x / Urobili: x   Blood: x / Protein: x / Nitrite: x   Leuk Esterase: x / RBC: x / WBC x   Sq Epi: x / Non Sq Epi: x / Bacteria: x

## 2024-10-02 NOTE — PROGRESS NOTE ADULT - NSPROGADDITIONALINFOA_GEN_ALL_CORE
24 y.o. P0 at 30w1d w/ di-di twins c/b FGR of baby B (EFW <1%, AC <1% w/ normal dopplers) admitted for preeclampsia with severe features. Met criteria with severe range blood pressure requiring IV hypertensive. Blood pressures currently well controlled on Nifedipine 30mg. No maternal/fetal complaints. No serum abnormalities on labs and will space to q3d. This morning with NST reassuring x2. S/p BMZ (-) and NICU. Last scan with breech/breech presentation. Last growth  with A: EFW 1183g 23%, AC 24%, B: EFW 710g <1%, AC <1%. Plan for BPP tomorrow. Consented for primary . Continue in house monitoring with delivery at 34w0d or sooner if worsening uncontrolled blood pressures, end organ damage, or non-reassuring fetal status.    Seen and d/w FERMIN HuberM Fellow

## 2024-10-03 ENCOUNTER — APPOINTMENT (OUTPATIENT)
Dept: ANTEPARTUM | Facility: CLINIC | Age: 24
End: 2024-10-03

## 2024-10-03 PROCEDURE — 99232 SBSQ HOSP IP/OBS MODERATE 35: CPT

## 2024-10-03 RX ADMIN — HEPARIN SODIUM 5000 UNIT(S): 10000 INJECTION INTRAVENOUS; SUBCUTANEOUS at 20:59

## 2024-10-03 RX ADMIN — Medication 30 MILLIGRAM(S): at 06:05

## 2024-10-03 RX ADMIN — HEPARIN SODIUM 5000 UNIT(S): 10000 INJECTION INTRAVENOUS; SUBCUTANEOUS at 08:56

## 2024-10-03 NOTE — PROGRESS NOTE ADULT - NSPROGADDITIONALINFOA_GEN_ALL_CORE
24 y.o. P0 at 30w2d w/ di-di twins c/b FGR of baby B (EFW <1%, AC <1% w/ normal dopplers) admitted for preeclampsia with severe features. Met criteria with severe range blood pressure requiring IV hypertensive. Currently on Nifedipine 30mg with blood pressures 120-130s/80s. No serum abnormalities - repeat labs q3d. NST reassuring x2. S/p BMZ (-) and NICU. Last scan with breech/breech presentation. Last growth  with A: EFW 1183g 23%, AC 24%, B: EFW 710g <1%, AC <1%. Plan for BPP tomorrow and growth next week. Consented for primary . Continue in house monitoring with delivery at 34w0d or sooner if worsening uncontrolled blood pressures, end organ damage, or non-reassuring fetal status.    Seen and d/w ROMERO Huber Fellow

## 2024-10-03 NOTE — PROGRESS NOTE ADULT - ASSESSMENT
24y  at 30w2d GA by LMP admitted as a transfer from [facility] due to sPEC in the setting of severe FGR of baby B. Pt is s/p Labetalol 20 IVP x1 at [facility] and has not required any more short acting antihypertensive medications. Pt is asymptomatic this morning and had no overnight events.    #sPEC  - BP overnight 110-120/80s  - HELLP q3d  - s/p Mg x24h  - s/p Lab 20  - Continue Procardia 30XL daily  - continue aspirin    #Fetal wellbeing  -ATU(): A: beech inferior maternal R, anterior placenta, MVP 6, BPP 8/8, UAD wnl. Fetus B: breech superior maternal L, anterior placenta, MVP 6.3, BPP 8/8, UAD wnl  - NST BID  - PNV  - BMZ (-)  - s/p NICU consult   - GBS: neg    #Maternal wellbeing  - T&S q3d  - regular diet   - HSQ for DVT prophylaxis      Maureen Shipman PGY3

## 2024-10-03 NOTE — PROGRESS NOTE ADULT - SUBJECTIVE AND OBJECTIVE BOX
PGY 3 Antepartum Note    Patient seen and examined at bedside in NAD. Denies any CTX, LOF or VB.  Reports good fetal movement x2. Denies headache, vision changes, RUQ/epigastric pain.    T(F): 98.6 (05:58)  HR: 74 (05:58)  BP: 122/77 (05:58)  RR: 18 (05:58)      Gen: NAD  Cardio: rrr, s1/s2  Pulm: ca b/l  Abd: gravid, nontender, no palpable contractions  Ext: no edema,  no calf tenderness  SVE: deferred    Medications:    betamethasone Injectable: 12 milliGRAM(s) (09-30-24 @ 01:11)  heparin   Injectable: 5000 Unit(s) (10-02-24 @ 21:23),  5000 Unit(s) (10-02-24 @ 09:16),  5000 Unit(s) (10-01-24 @ 20:57),  5000 Unit(s) (10-01-24 @ 09:13),  5000 Unit(s) (09-30-24 @ 21:33),  5000 Unit(s) (09-30-24 @ 09:12)  magnesium sulfate Infusion: 50 mL/Hr (09-29-24 @ 10:09),  50 mL/Hr (09-29-24 @ 10:04)  NIFEdipine XL: 30 milliGRAM(s) (10-03-24 @ 06:05),  30 milliGRAM(s) (10-02-24 @ 06:14),  30 milliGRAM(s) (10-01-24 @ 05:17),  30 milliGRAM(s) (09-30-24 @ 05:14)      Labs:                        12.7   10.45 )-----------( 255      ( 02 Oct 2024 06:38 )             38.4     10-02    136  |  104  |  11  ----------------------------<  80  3.6   |  18[L]  |  0.41[L]    Ca    9.3      02 Oct 2024 06:38    TPro  7.0  /  Alb  4.1  /  TBili  0.3  /  DBili  x   /  AST  18  /  ALT  12  /  AlkPhos  175[H]  10-02      Urinalysis Basic - ( 02 Oct 2024 06:38 )    Color: x / Appearance: x / SG: x / pH: x  Gluc: 80 mg/dL / Ketone: x  / Bili: x / Urobili: x   Blood: x / Protein: x / Nitrite: x   Leuk Esterase: x / RBC: x / WBC x   Sq Epi: x / Non Sq Epi: x / Bacteria: x

## 2024-10-04 ENCOUNTER — APPOINTMENT (OUTPATIENT)
Dept: ANTEPARTUM | Facility: CLINIC | Age: 24
End: 2024-10-04

## 2024-10-04 ENCOUNTER — ASOB RESULT (OUTPATIENT)
Age: 24
End: 2024-10-04

## 2024-10-04 PROCEDURE — 99232 SBSQ HOSP IP/OBS MODERATE 35: CPT

## 2024-10-04 RX ORDER — METOCLOPRAMIDE HCL 10 MG
10 TABLET ORAL ONCE
Refills: 0 | Status: COMPLETED | OUTPATIENT
Start: 2024-10-04 | End: 2024-10-04

## 2024-10-04 RX ADMIN — HEPARIN SODIUM 5000 UNIT(S): 10000 INJECTION INTRAVENOUS; SUBCUTANEOUS at 09:49

## 2024-10-04 RX ADMIN — Medication 10 MILLIGRAM(S): at 09:26

## 2024-10-04 RX ADMIN — Medication 30 MILLIGRAM(S): at 05:39

## 2024-10-04 RX ADMIN — Medication 975 MILLIGRAM(S): at 09:25

## 2024-10-04 RX ADMIN — HEPARIN SODIUM 5000 UNIT(S): 10000 INJECTION INTRAVENOUS; SUBCUTANEOUS at 20:57

## 2024-10-04 RX ADMIN — Medication 975 MILLIGRAM(S): at 10:15

## 2024-10-04 NOTE — PROGRESS NOTE ADULT - SUBJECTIVE AND OBJECTIVE BOX
PGY 3 Antepartum Note    Patient seen and examined at bedside in NAD. Denies any CTX, LOF or VB.  Reports good fetal movement x2. Denies headache, vision changes, RUQ/epigastric pain.    T(F): 98.8 (05:38)  HR: 100 (05:38)  BP: 125/81 (05:38)  RR: 18 (05:38)      Gen: NAD  Cardio: rrr, s1/s2  Pulm: ca b/l  Abd: gravid, nontender, no palpable contractions  Ext: no edema,  no calf tenderness  SVE: deferred    Medications:    betamethasone Injectable: 12 milliGRAM(s) (09-30-24 @ 01:11)  heparin   Injectable: 5000 Unit(s) (10-03-24 @ 20:59),  5000 Unit(s) (10-03-24 @ 08:56),  5000 Unit(s) (10-02-24 @ 21:23),  5000 Unit(s) (10-02-24 @ 09:16),  5000 Unit(s) (10-01-24 @ 20:57),  5000 Unit(s) (10-01-24 @ 09:13),  5000 Unit(s) (09-30-24 @ 21:33),  5000 Unit(s) (09-30-24 @ 09:12)  magnesium sulfate Infusion: 50 mL/Hr (09-29-24 @ 10:09),  50 mL/Hr (09-29-24 @ 10:04)  NIFEdipine XL: 30 milliGRAM(s) (10-04-24 @ 05:39),  30 milliGRAM(s) (10-03-24 @ 06:05),  30 milliGRAM(s) (10-02-24 @ 06:14),  30 milliGRAM(s) (10-01-24 @ 05:17),  30 milliGRAM(s) (09-30-24 @ 05:14)      Labs:

## 2024-10-04 NOTE — PROGRESS NOTE ADULT - NSPROGADDITIONALINFOA_GEN_ALL_CORE
24 y.o. P0 at 30w4d w/ di-di twins c/b FGR of baby B (EFW <1%, AC <1% w/ normal dopplers) admitted for preeclampsia with severe features. Met criteria with severe range blood pressure requiring IV hypertensive. Currently on Nifedipine 30mg with blood pressures 120-130s/80s. No serum abnormalities - repeat labs q3d. This morning with mild headache, will trial Tylenol and re-assess.    NST reassuring x2. S/p BMZ (-) and NICU. Last scan with breech/breech presentation. Last growth  with A: EFW 1183g 23%, AC 24%, B: EFW 710g <1%, AC <1%. Plan for BPP/growth today. Consented for primary  for fetal malpresentation. Continue in house monitoring with delivery at 34w0d or sooner if worsening uncontrolled blood pressures, end organ damage, or non-reassuring fetal status.    Seen and d/w Dr. Altagracia Bear, FERMINM Fellow

## 2024-10-04 NOTE — PROGRESS NOTE ADULT - ASSESSMENT
24y  at 30w3d GA by LMP admitted as a transfer from [facility] due to sPEC in the setting of severe FGR of baby B. Pt is s/p Labetalol 20 IVP x1 at [facility] and has not required any more short acting antihypertensive medications. Pt is asymptomatic this morning and had no overnight events.    #sPEC  - BP overnight 110-140/70-80s  - HELLP q3d  - s/p Mg x24h  - s/p Lab 20  - Continue Procardia 30XL daily  - continue aspirin    #Fetal wellbeing  -ATU(): A: beech inferior maternal R, anterior placenta, MVP 6, BPP 8/8, UAD wnl. Fetus B: breech superior maternal L, anterior placenta, MVP 6.3, BPP 8/8, UAD wnl  - NST BID  - PNV  - BMZ (-)  - s/p NICU consult   - GBS: neg    #Maternal wellbeing  - T&S q3d  - regular diet   - HSQ for DVT prophylaxis      Maureen Shipman PGY3

## 2024-10-05 LAB
ALBUMIN SERPL ELPH-MCNC: 3.8 G/DL — SIGNIFICANT CHANGE UP (ref 3.3–5)
ALP SERPL-CCNC: 184 U/L — HIGH (ref 40–120)
ALT FLD-CCNC: 10 U/L — SIGNIFICANT CHANGE UP (ref 10–45)
ANION GAP SERPL CALC-SCNC: 13 MMOL/L — SIGNIFICANT CHANGE UP (ref 5–17)
AST SERPL-CCNC: 11 U/L — SIGNIFICANT CHANGE UP (ref 10–40)
BASOPHILS # BLD AUTO: 0.04 K/UL — SIGNIFICANT CHANGE UP (ref 0–0.2)
BASOPHILS NFR BLD AUTO: 0.4 % — SIGNIFICANT CHANGE UP (ref 0–2)
BILIRUB SERPL-MCNC: 0.2 MG/DL — SIGNIFICANT CHANGE UP (ref 0.2–1.2)
BLD GP AB SCN SERPL QL: NEGATIVE — SIGNIFICANT CHANGE UP
BUN SERPL-MCNC: 6 MG/DL — LOW (ref 7–23)
CALCIUM SERPL-MCNC: 9.7 MG/DL — SIGNIFICANT CHANGE UP (ref 8.4–10.5)
CHLORIDE SERPL-SCNC: 103 MMOL/L — SIGNIFICANT CHANGE UP (ref 96–108)
CO2 SERPL-SCNC: 18 MMOL/L — LOW (ref 22–31)
CREAT SERPL-MCNC: 0.34 MG/DL — LOW (ref 0.5–1.3)
EGFR: 145 ML/MIN/1.73M2 — SIGNIFICANT CHANGE UP
EOSINOPHIL # BLD AUTO: 0.12 K/UL — SIGNIFICANT CHANGE UP (ref 0–0.5)
EOSINOPHIL NFR BLD AUTO: 1.1 % — SIGNIFICANT CHANGE UP (ref 0–6)
GLUCOSE SERPL-MCNC: 91 MG/DL — SIGNIFICANT CHANGE UP (ref 70–99)
HCT VFR BLD CALC: 39.1 % — SIGNIFICANT CHANGE UP (ref 34.5–45)
HGB BLD-MCNC: 12.8 G/DL — SIGNIFICANT CHANGE UP (ref 11.5–15.5)
IMM GRANULOCYTES NFR BLD AUTO: 1 % — HIGH (ref 0–0.9)
LDH SERPL L TO P-CCNC: 156 U/L — SIGNIFICANT CHANGE UP (ref 50–242)
LYMPHOCYTES # BLD AUTO: 18.2 % — SIGNIFICANT CHANGE UP (ref 13–44)
LYMPHOCYTES # BLD AUTO: 2.07 K/UL — SIGNIFICANT CHANGE UP (ref 1–3.3)
MCHC RBC-ENTMCNC: 27.4 PG — SIGNIFICANT CHANGE UP (ref 27–34)
MCHC RBC-ENTMCNC: 32.7 GM/DL — SIGNIFICANT CHANGE UP (ref 32–36)
MCV RBC AUTO: 83.5 FL — SIGNIFICANT CHANGE UP (ref 80–100)
MONOCYTES # BLD AUTO: 1.08 K/UL — HIGH (ref 0–0.9)
MONOCYTES NFR BLD AUTO: 9.5 % — SIGNIFICANT CHANGE UP (ref 2–14)
NEUTROPHILS # BLD AUTO: 7.94 K/UL — HIGH (ref 1.8–7.4)
NEUTROPHILS NFR BLD AUTO: 69.8 % — SIGNIFICANT CHANGE UP (ref 43–77)
NRBC # BLD: 0 /100 WBCS — SIGNIFICANT CHANGE UP (ref 0–0)
PLATELET # BLD AUTO: 239 K/UL — SIGNIFICANT CHANGE UP (ref 150–400)
POTASSIUM SERPL-MCNC: 3.9 MMOL/L — SIGNIFICANT CHANGE UP (ref 3.5–5.3)
POTASSIUM SERPL-SCNC: 3.9 MMOL/L — SIGNIFICANT CHANGE UP (ref 3.5–5.3)
PROT SERPL-MCNC: 7.1 G/DL — SIGNIFICANT CHANGE UP (ref 6–8.3)
RBC # BLD: 4.68 M/UL — SIGNIFICANT CHANGE UP (ref 3.8–5.2)
RBC # FLD: 12.8 % — SIGNIFICANT CHANGE UP (ref 10.3–14.5)
RH IG SCN BLD-IMP: POSITIVE — SIGNIFICANT CHANGE UP
SODIUM SERPL-SCNC: 134 MMOL/L — LOW (ref 135–145)
URATE SERPL-MCNC: 3.4 MG/DL — SIGNIFICANT CHANGE UP (ref 2.5–7)
WBC # BLD: 11.36 K/UL — HIGH (ref 3.8–10.5)
WBC # FLD AUTO: 11.36 K/UL — HIGH (ref 3.8–10.5)

## 2024-10-05 PROCEDURE — 99232 SBSQ HOSP IP/OBS MODERATE 35: CPT

## 2024-10-05 RX ADMIN — Medication 30 MILLIGRAM(S): at 06:14

## 2024-10-05 RX ADMIN — HEPARIN SODIUM 5000 UNIT(S): 10000 INJECTION INTRAVENOUS; SUBCUTANEOUS at 21:07

## 2024-10-05 RX ADMIN — HEPARIN SODIUM 5000 UNIT(S): 10000 INJECTION INTRAVENOUS; SUBCUTANEOUS at 09:01

## 2024-10-05 NOTE — PROGRESS NOTE ADULT - ASSESSMENT
24y  at 30w4d GA by LMP admitted as a transfer from [facility] due to sPEC in the setting of severe FGR of baby B. Pt is s/p Labetalol 20 IVP x1 at [facility] and has not required any more short acting antihypertensive medications. Pt is asymptomatic this morning and had no overnight events.    #sPEC  - cw BP monitoring  - HELLP q3d  - s/p Mg x24h  - s/p Lab 20  - Continue Procardia 30XL daily    #Fetal wellbeing  ATU (10/4): A:breech, maternal R, anterior placenta, 1494g, MVP 4.19, BPP 8/8. B: transverse head mat R, anterior placenta, MVP 3.39, 888g (<1%, AC <1%, intermittently elevated UAD), BPP 8/8  -ATU(): A: beech inferior maternal R, anterior placenta, MVP 6, BPP 8/8, UAD wnl. Fetus B: breech superior maternal L, anterior placenta, MVP 6.3, BPP 8/8, UAD wnl  - NST BID  - PNV  - BMZ (-)  - s/p NICU consult   - GBS: neg    #Maternal wellbeing  - T&S q3d  - regular diet   - HSQ for DVT prophylaxis    DTaveras PGY3

## 2024-10-05 NOTE — PROGRESS NOTE ADULT - SUBJECTIVE AND OBJECTIVE BOX
R3 Antepartum Note, HD#    Patient seen and examined at bedside, no acute overnight events. No acute complaints. Pt reports +FM, denies LOF, VB, ctx, HA, epigastric pain, blurred vision, CP, SOB, N/V, fevers, and chills.    Vital Signs Last 24 Hours  T(C): 36.8 (10-05-24 @ 06:20), Max: 37.5 (10-04-24 @ 22:08)  HR: 105 (10-05-24 @ 06:20) (105 - 112)  BP: 120/81 (10-05-24 @ 06:20) (120/81 - 144/91)  RR: 18 (10-05-24 @ 06:20) (18 - 18)  SpO2: 97% (10-05-24 @ 06:20) (95% - 99%)    CAPILLARY BLOOD GLUCOSE          Physical Exam:  General: NAD  Abdomen: Soft, non-tender, gravid  Ext: No pain or swelling    NST reactive overnight    Labs:                MEDICATIONS  (STANDING):  heparin   Injectable 5000 Unit(s) SubCutaneous every 12 hours  influenza   Vaccine 0.5 milliLiter(s) IntraMuscular once  lactated ringers. 1000 milliLiter(s) (50 mL/Hr) IV Continuous <Continuous>  NIFEdipine XL 30 milliGRAM(s) Oral every 24 hours    MEDICATIONS  (PRN):  acetaminophen     Tablet .. 975 milliGRAM(s) Oral every 6 hours PRN Mild Pain (1 - 3), Moderate Pain (4 - 6)  ondansetron Injectable 4 milliGRAM(s) IV Push every 8 hours PRN Nausea and/or Vomiting

## 2024-10-06 PROCEDURE — 99232 SBSQ HOSP IP/OBS MODERATE 35: CPT

## 2024-10-06 RX ADMIN — HEPARIN SODIUM 5000 UNIT(S): 10000 INJECTION INTRAVENOUS; SUBCUTANEOUS at 21:07

## 2024-10-06 RX ADMIN — Medication 30 MILLIGRAM(S): at 06:20

## 2024-10-06 RX ADMIN — HEPARIN SODIUM 5000 UNIT(S): 10000 INJECTION INTRAVENOUS; SUBCUTANEOUS at 09:10

## 2024-10-06 NOTE — PROGRESS NOTE ADULT - ASSESSMENT
24y  at 30w5d GA by LMP admitted as a transfer from [facility] due to sPEC in the setting of severe FGR of baby B. Pt is s/p Labetalol 20 IVP x1 at [facility] and has not required any more short acting antihypertensive medications. Pt is asymptomatic this morning and had no overnight events.    #sPEC  - cw BP monitoring  - HELLP q3d  - s/p Mg x24h  - s/p Lab 20  - Continue Procardia 30XL daily    #Fetal wellbeing  ATU (10/4): A: breech, maternal R, anterior placenta, 1494g, MVP 4.19, BPP 8/8. B: transverse head mat R, anterior placenta, MVP 3.39, 888g (<1%, AC <1%, intermittently elevated UAD), BPP 8/8  -ATU(): A: beech inferior maternal R, anterior placenta, MVP 6, BPP 8/8, UAD wnl. Fetus B: breech superior maternal L, anterior placenta, MVP 6.3, BPP 8/8, UAD wnl  - NST BID  - PNV  - BMZ (-)  - s/p NICU consult   - GBS: neg    #Maternal wellbeing  - T&S q3d  - regular diet   - HSQ for DVT prophylaxis    Maureen Shipman PGY3   24y  at 30w6d GA by LMP admitted as a transfer from [facility] due to sPEC in the setting of severe FGR of baby B. Pt is s/p Labetalol 20 IVP x1 at [facility] and has not required any more short acting antihypertensive medications. Pt is asymptomatic this morning and had no overnight events.    #sPEC  - cw BP monitoring  - HELLP q3d  - s/p Mg x24h  - s/p Lab 20  - Continue Procardia 30XL daily    #Fetal wellbeing  ATU (10/4): A: breech, maternal R, anterior placenta, 1494g, MVP 4.19, BPP 8/8. B: transverse head mat R, anterior placenta, MVP 3.39, 888g (<1%, AC <1%, intermittently elevated UAD), BPP 8/8  -ATU(): A: beech inferior maternal R, anterior placenta, MVP 6, BPP 8/8, UAD wnl. Fetus B: breech superior maternal L, anterior placenta, MVP 6.3, BPP 8/8, UAD wnl  - NST BID  - PNV  - BMZ (-)  - s/p NICU consult   - GBS: neg    #Maternal wellbeing  - T&S q3d  - regular diet   - HSQ for DVT prophylaxis    Maureen Shipman PGY3

## 2024-10-06 NOTE — PROGRESS NOTE ADULT - SUBJECTIVE AND OBJECTIVE BOX
PGY 3 Antepartum Note    Patient seen and examined at bedside in NAD. Denies any CTX, LOF or VB.  Reports good fetal movement. Denies headache, vision changes, RUQ/epigastric pain.    T(F): 98.2 (00:45)  HR: 103 (00:45)  BP: 118/74 (00:45)  RR: 18 (00:45)    Gen: NAD  Cardio: rrr, s1/s2  Pulm: ca b/l  Abd: gravid, nontender, no palpable contractions  Ext: no edema,  no calf tenderness  SVE: deferred    Medications:    betamethasone Injectable: 12 milliGRAM(s) (09-30-24 @ 01:11)  heparin   Injectable: 5000 Unit(s) (10-05-24 @ 21:07),  5000 Unit(s) (10-05-24 @ 09:01),  5000 Unit(s) (10-04-24 @ 20:57),  5000 Unit(s) (10-04-24 @ 09:49),  5000 Unit(s) (10-03-24 @ 20:59),  5000 Unit(s) (10-03-24 @ 08:56),  5000 Unit(s) (10-02-24 @ 21:23),  5000 Unit(s) (10-02-24 @ 09:16),  5000 Unit(s) (10-01-24 @ 20:57),  5000 Unit(s) (10-01-24 @ 09:13),  5000 Unit(s) (09-30-24 @ 21:33),  5000 Unit(s) (09-30-24 @ 09:12)  magnesium sulfate Infusion: 50 mL/Hr (09-29-24 @ 10:09),  50 mL/Hr (09-29-24 @ 10:04)  metoclopramide: 10 milliGRAM(s) (10-04-24 @ 09:26)  NIFEdipine XL: 30 milliGRAM(s) (10-05-24 @ 06:14),  30 milliGRAM(s) (10-04-24 @ 05:39),  30 milliGRAM(s) (10-03-24 @ 06:05),  30 milliGRAM(s) (10-02-24 @ 06:14),  30 milliGRAM(s) (10-01-24 @ 05:17),  30 milliGRAM(s) (09-30-24 @ 05:14)      Labs:                        12.8   11.36 )-----------( 239      ( 05 Oct 2024 06:43 )             39.1     10-05    134[L]  |  103  |  6[L]  ----------------------------<  91  3.9   |  18[L]  |  0.34[L]    Ca    9.7      05 Oct 2024 06:43    TPro  7.1  /  Alb  3.8  /  TBili  0.2  /  DBili  x   /  AST  11  /  ALT  10  /  AlkPhos  184[H]  10-05      Urinalysis Basic - ( 05 Oct 2024 06:43 )    Color: x / Appearance: x / SG: x / pH: x  Gluc: 91 mg/dL / Ketone: x  / Bili: x / Urobili: x   Blood: x / Protein: x / Nitrite: x   Leuk Esterase: x / RBC: x / WBC x   Sq Epi: x / Non Sq Epi: x / Bacteria: x

## 2024-10-07 ENCOUNTER — ASOB RESULT (OUTPATIENT)
Age: 24
End: 2024-10-07

## 2024-10-07 ENCOUNTER — APPOINTMENT (OUTPATIENT)
Dept: ANTEPARTUM | Facility: CLINIC | Age: 24
End: 2024-10-07

## 2024-10-07 PROCEDURE — 99231 SBSQ HOSP IP/OBS SF/LOW 25: CPT

## 2024-10-07 RX ADMIN — HEPARIN SODIUM 5000 UNIT(S): 10000 INJECTION INTRAVENOUS; SUBCUTANEOUS at 20:28

## 2024-10-07 RX ADMIN — Medication 30 MILLIGRAM(S): at 05:45

## 2024-10-07 RX ADMIN — HEPARIN SODIUM 5000 UNIT(S): 10000 INJECTION INTRAVENOUS; SUBCUTANEOUS at 10:13

## 2024-10-07 NOTE — PROGRESS NOTE ADULT - SUBJECTIVE AND OBJECTIVE BOX
PGY 3 Antepartum Note    Patient seen and examined at bedside in NAD. Denies any CTX, LOF or VB.  Reports good fetal movement. Denies headache, vision changes, RUQ/epigastric pain.    T(F): 98.3 (05:28)  HR: 90 (05:28)  BP: 125/82 (05:28)  RR: 18 (05:28)      Gen: NAD  Cardio: rrr, s1/s2  Pulm: ca b/l  Abd: gravid, nontender, no palpable contractions  Ext: no edema,  no calf tenderness  SVE: deferred    Medications:    betamethasone Injectable: 12 milliGRAM(s) (09-30-24 @ 01:11)  heparin   Injectable: 5000 Unit(s) (10-06-24 @ 21:07),  5000 Unit(s) (10-06-24 @ 09:10),  5000 Unit(s) (10-05-24 @ 21:07),  5000 Unit(s) (10-05-24 @ 09:01),  5000 Unit(s) (10-04-24 @ 20:57),  5000 Unit(s) (10-04-24 @ 09:49),  5000 Unit(s) (10-03-24 @ 20:59),  5000 Unit(s) (10-03-24 @ 08:56),  5000 Unit(s) (10-02-24 @ 21:23),  5000 Unit(s) (10-02-24 @ 09:16),  5000 Unit(s) (10-01-24 @ 20:57),  5000 Unit(s) (10-01-24 @ 09:13),  5000 Unit(s) (09-30-24 @ 21:33),  5000 Unit(s) (09-30-24 @ 09:12)  magnesium sulfate Infusion: 50 mL/Hr (09-29-24 @ 10:09),  50 mL/Hr (09-29-24 @ 10:04)  metoclopramide: 10 milliGRAM(s) (10-04-24 @ 09:26)  NIFEdipine XL: 30 milliGRAM(s) (10-07-24 @ 05:45),  30 milliGRAM(s) (10-06-24 @ 06:20),  30 milliGRAM(s) (10-05-24 @ 06:14),  30 milliGRAM(s) (10-04-24 @ 05:39),  30 milliGRAM(s) (10-03-24 @ 06:05),  30 milliGRAM(s) (10-02-24 @ 06:14),  30 milliGRAM(s) (10-01-24 @ 05:17),  30 milliGRAM(s) (09-30-24 @ 05:14)      Labs:

## 2024-10-07 NOTE — PROGRESS NOTE ADULT - NSPROGADDITIONALINFOA_GEN_ALL_CORE
24 y.o. P0 at 31w0d admitted for preeclampsia with severe features with prenatal care c/b severe FGR of baby B (EFW <1%, AC <1%, intermittent elevated dopplers). Over the weekend has had intermittent headache, resolved with Tylenol, but asymptomatic this morning and without labor complaints. Since receiving single IV Labetalol push, blood pressures have been well controlled on Nifedipine 30mg (120-130s/80s). Labs q3d, without serum abnormalities. This morning with reassuring fetal status - reactive and reassuring x2. Plan for BPP. Next growth in ~2 weeks. s/p BMZ (-), NICU consult, Mg. MOD: Primary  for fetal malpresentation. Candidate for rescue steroids if indicated. Inpatient monitoring until delivery at 34 weeks or sooner if clinically indicated (worsening uncontrolled blood pressures, end organ dysfunction, non-reassuring fetal status).     Seen and d/w ROMERO Villagomez Fellow

## 2024-10-07 NOTE — PROGRESS NOTE ADULT - ASSESSMENT
24y  at 31w0d GA by LMP admitted as a transfer from [facility] due to sPEC in the setting of severe FGR of baby B. Pt is s/p Labetalol 20 IVP x1 at [facility] and has not required any more short acting antihypertensive medications. Pt is asymptomatic this morning and had no overnight events.    #sPEC  - cw BP monitoring  - HELLP q3d  - s/p Mg x24h  - s/p Lab 20  - Continue Procardia 30XL daily    #Fetal wellbeing  ATU (10/4): A: breech, maternal R, anterior placenta, 1494g, MVP 4.19, BPP 8/8. B: transverse head mat R, anterior placenta, MVP 3.39, 888g (<1%, AC <1%, intermittently elevated UAD), BPP 8/8  -ATU(): A: beech inferior maternal R, anterior placenta, MVP 6, BPP 8/8, UAD wnl. Fetus B: breech superior maternal L, anterior placenta, MVP 6.3, BPP 8/8, UAD wnl  - NST BID  - PNV  - BMZ (-)  - s/p NICU consult   - GBS: neg    #Maternal wellbeing  - T&S q3d  - regular diet   - HSQ for DVT prophylaxis    Maureen Shipman PGY3

## 2024-10-08 ENCOUNTER — APPOINTMENT (OUTPATIENT)
Dept: ANTEPARTUM | Facility: CLINIC | Age: 24
End: 2024-10-08

## 2024-10-08 LAB
ALBUMIN SERPL ELPH-MCNC: 3.7 G/DL — SIGNIFICANT CHANGE UP (ref 3.3–5)
ALP SERPL-CCNC: 193 U/L — HIGH (ref 40–120)
ALT FLD-CCNC: 12 U/L — SIGNIFICANT CHANGE UP (ref 10–45)
ANION GAP SERPL CALC-SCNC: 13 MMOL/L — SIGNIFICANT CHANGE UP (ref 5–17)
AST SERPL-CCNC: 14 U/L — SIGNIFICANT CHANGE UP (ref 10–40)
BASOPHILS # BLD AUTO: 0.03 K/UL — SIGNIFICANT CHANGE UP (ref 0–0.2)
BASOPHILS NFR BLD AUTO: 0.3 % — SIGNIFICANT CHANGE UP (ref 0–2)
BILIRUB SERPL-MCNC: 0.2 MG/DL — SIGNIFICANT CHANGE UP (ref 0.2–1.2)
BLD GP AB SCN SERPL QL: NEGATIVE — SIGNIFICANT CHANGE UP
BUN SERPL-MCNC: 7 MG/DL — SIGNIFICANT CHANGE UP (ref 7–23)
CALCIUM SERPL-MCNC: 9.6 MG/DL — SIGNIFICANT CHANGE UP (ref 8.4–10.5)
CHLORIDE SERPL-SCNC: 103 MMOL/L — SIGNIFICANT CHANGE UP (ref 96–108)
CO2 SERPL-SCNC: 17 MMOL/L — LOW (ref 22–31)
CREAT SERPL-MCNC: 0.4 MG/DL — LOW (ref 0.5–1.3)
EGFR: 140 ML/MIN/1.73M2 — SIGNIFICANT CHANGE UP
EOSINOPHIL # BLD AUTO: 0.1 K/UL — SIGNIFICANT CHANGE UP (ref 0–0.5)
EOSINOPHIL NFR BLD AUTO: 1 % — SIGNIFICANT CHANGE UP (ref 0–6)
GLUCOSE SERPL-MCNC: 113 MG/DL — HIGH (ref 70–99)
HCT VFR BLD CALC: 38.8 % — SIGNIFICANT CHANGE UP (ref 34.5–45)
HGB BLD-MCNC: 13.1 G/DL — SIGNIFICANT CHANGE UP (ref 11.5–15.5)
IMM GRANULOCYTES NFR BLD AUTO: 0.8 % — SIGNIFICANT CHANGE UP (ref 0–0.9)
LDH SERPL L TO P-CCNC: 178 U/L — SIGNIFICANT CHANGE UP (ref 50–242)
LYMPHOCYTES # BLD AUTO: 1.98 K/UL — SIGNIFICANT CHANGE UP (ref 1–3.3)
LYMPHOCYTES # BLD AUTO: 19.5 % — SIGNIFICANT CHANGE UP (ref 13–44)
MCHC RBC-ENTMCNC: 28.5 PG — SIGNIFICANT CHANGE UP (ref 27–34)
MCHC RBC-ENTMCNC: 33.8 GM/DL — SIGNIFICANT CHANGE UP (ref 32–36)
MCV RBC AUTO: 84.5 FL — SIGNIFICANT CHANGE UP (ref 80–100)
MONOCYTES # BLD AUTO: 0.89 K/UL — SIGNIFICANT CHANGE UP (ref 0–0.9)
MONOCYTES NFR BLD AUTO: 8.8 % — SIGNIFICANT CHANGE UP (ref 2–14)
NEUTROPHILS # BLD AUTO: 7.09 K/UL — SIGNIFICANT CHANGE UP (ref 1.8–7.4)
NEUTROPHILS NFR BLD AUTO: 69.6 % — SIGNIFICANT CHANGE UP (ref 43–77)
NRBC # BLD: 0 /100 WBCS — SIGNIFICANT CHANGE UP (ref 0–0)
PLATELET # BLD AUTO: 250 K/UL — SIGNIFICANT CHANGE UP (ref 150–400)
POTASSIUM SERPL-MCNC: 3.6 MMOL/L — SIGNIFICANT CHANGE UP (ref 3.5–5.3)
POTASSIUM SERPL-SCNC: 3.6 MMOL/L — SIGNIFICANT CHANGE UP (ref 3.5–5.3)
PROT SERPL-MCNC: 6.9 G/DL — SIGNIFICANT CHANGE UP (ref 6–8.3)
RBC # BLD: 4.59 M/UL — SIGNIFICANT CHANGE UP (ref 3.8–5.2)
RBC # FLD: 12.5 % — SIGNIFICANT CHANGE UP (ref 10.3–14.5)
RH IG SCN BLD-IMP: POSITIVE — SIGNIFICANT CHANGE UP
SODIUM SERPL-SCNC: 133 MMOL/L — LOW (ref 135–145)
URATE SERPL-MCNC: 3.5 MG/DL — SIGNIFICANT CHANGE UP (ref 2.5–7)
WBC # BLD: 10.17 K/UL — SIGNIFICANT CHANGE UP (ref 3.8–10.5)
WBC # FLD AUTO: 10.17 K/UL — SIGNIFICANT CHANGE UP (ref 3.8–10.5)

## 2024-10-08 PROCEDURE — 99231 SBSQ HOSP IP/OBS SF/LOW 25: CPT

## 2024-10-08 RX ORDER — METOCLOPRAMIDE HCL 10 MG
10 TABLET ORAL EVERY 8 HOURS
Refills: 0 | Status: DISCONTINUED | OUTPATIENT
Start: 2024-10-08 | End: 2024-11-01

## 2024-10-08 RX ADMIN — HEPARIN SODIUM 5000 UNIT(S): 10000 INJECTION INTRAVENOUS; SUBCUTANEOUS at 20:23

## 2024-10-08 RX ADMIN — Medication 30 MILLIGRAM(S): at 05:52

## 2024-10-08 RX ADMIN — Medication 1000 MILLILITER(S): at 23:16

## 2024-10-08 RX ADMIN — Medication 975 MILLIGRAM(S): at 05:57

## 2024-10-08 RX ADMIN — HEPARIN SODIUM 5000 UNIT(S): 10000 INJECTION INTRAVENOUS; SUBCUTANEOUS at 09:15

## 2024-10-08 RX ADMIN — Medication 975 MILLIGRAM(S): at 06:25

## 2024-10-08 NOTE — PROGRESS NOTE ADULT - NSPROGADDITIONALINFOA_GEN_ALL_CORE
Chart(s)/Patient 24 y.o. P0 at 31w1d admitted for preeclampsia with severe features with prenatal care c/b severe FGR of baby B (EFW <1%, AC <1%, intermittent elevated dopplers). This morning had a headache again but improved with Tylenol/sleep and no other symptoms. Blood pressures continue to be well controlled on Nifedipine 30mg with labs reviewed from this morning with no serum abnormalities. This AM w/ reactive and reassuring NST x2. Plan for BPP again later this week with Dopplers. s/p BMZ (-), NICU consult, Mg. MOD: Primary  for fetal malpresentation. Candidate for rescue steroids if indicated. Inpatient monitoring until delivery at 34 weeks or sooner if clinically indicated (worsening uncontrolled blood pressures, end organ dysfunction, non-reassuring fetal status).     Seen and d/w Dr. Azeem Bear, MFM Fellow

## 2024-10-08 NOTE — PROGRESS NOTE ADULT - ASSESSMENT
24y  at 31w1d GA by LMP admitted as a transfer from [facility] due to sPEC in the setting of severe FGR of baby B. Pt is s/p Labetalol 20 IVP x1 at [facility] and has not required any more short acting antihypertensive medications. Pt complaining of headache this morning. Fetal statuses reassuring overnight.    #sPEC  - Tylenol and reglan PRN for headache  - cw BP monitoring  - HELLP q3d  - s/p Mg x24h  - s/p Lab 20  - Continue Procardia 30XL daily    #Fetal wellbeing  -ATU(10/7): A: breech, anterior placenta, MVP 5.23, BPP 8/8. B: back down head R, anterior placenta, MVP 3.75, BPP 8/8  -ATU (10/4): A: breech, maternal R, anterior placenta, 1494g, MVP 4.19, BPP 8/8. B: transverse head mat R, anterior placenta, MVP 3.39, 888g (<1%, AC <1%, intermittently elevated UAD), BPP 8/8  -ATU(): A: beech inferior maternal R, anterior placenta, MVP 6, BPP 8/8, UAD wnl. Fetus B: breech superior maternal L, anterior placenta, MVP 6.3, BPP 8/8, UAD wnl  - NST BID  - PNV  - BMZ (-)  - s/p NICU consult   - GBS: neg    #Maternal wellbeing  - T&S q3d  - regular diet   - HSQ for DVT prophylaxis    Maureen Shipman PGY3

## 2024-10-08 NOTE — PROGRESS NOTE ADULT - SUBJECTIVE AND OBJECTIVE BOX
PGY 3 Antepartum Note    Patient seen and examined at bedside in NAD. Denies any CTX, LOF or VB.  Reports good fetal movement. Pt endorses headache this morning, recently received Tylenol. Denies vision changes, RUQ/epigastric pain.    T(F): 98.5 (05:50)  HR: 102 (05:50)  BP: 123/72 (05:50)  RR: 18 (05:50)      Gen: NAD  Cardio: rrr, s1/s2  Pulm: ca b/l  Abd: gravid, nontender, no palpable contractions  Ext: no edema,  no calf tenderness  SVE: deferred    Medications:    betamethasone Injectable: 12 milliGRAM(s) (09-30-24 @ 01:11)  heparin   Injectable: 5000 Unit(s) (10-07-24 @ 20:28),  5000 Unit(s) (10-07-24 @ 10:13),  5000 Unit(s) (10-06-24 @ 21:07),  5000 Unit(s) (10-06-24 @ 09:10),  5000 Unit(s) (10-05-24 @ 21:07),  5000 Unit(s) (10-05-24 @ 09:01),  5000 Unit(s) (10-04-24 @ 20:57),  5000 Unit(s) (10-04-24 @ 09:49),  5000 Unit(s) (10-03-24 @ 20:59),  5000 Unit(s) (10-03-24 @ 08:56),  5000 Unit(s) (10-02-24 @ 21:23),  5000 Unit(s) (10-02-24 @ 09:16),  5000 Unit(s) (10-01-24 @ 20:57),  5000 Unit(s) (10-01-24 @ 09:13),  5000 Unit(s) (09-30-24 @ 21:33),  5000 Unit(s) (09-30-24 @ 09:12)  magnesium sulfate Infusion: 50 mL/Hr (09-29-24 @ 10:09),  50 mL/Hr (09-29-24 @ 10:04)  metoclopramide: 10 milliGRAM(s) (10-04-24 @ 09:26)  NIFEdipine XL: 30 milliGRAM(s) (10-08-24 @ 05:52),  30 milliGRAM(s) (10-07-24 @ 05:45),  30 milliGRAM(s) (10-06-24 @ 06:20),  30 milliGRAM(s) (10-05-24 @ 06:14),  30 milliGRAM(s) (10-04-24 @ 05:39),  30 milliGRAM(s) (10-03-24 @ 06:05),  30 milliGRAM(s) (10-02-24 @ 06:14),  30 milliGRAM(s) (10-01-24 @ 05:17),  30 milliGRAM(s) (09-30-24 @ 05:14)      Labs:                        13.1   10.17 )-----------( 250      ( 08 Oct 2024 06:27 )             38.8     10-08    133[L]  |  103  |  7   ----------------------------<  113[H]  3.6   |  17[L]  |  0.40[L]    Ca    9.6      08 Oct 2024 06:27    TPro  6.9  /  Alb  3.7  /  TBili  0.2  /  DBili  x   /  AST  14  /  ALT  12  /  AlkPhos  193[H]  10-08      Urinalysis Basic - ( 08 Oct 2024 06:27 )    Color: x / Appearance: x / SG: x / pH: x  Gluc: 113 mg/dL / Ketone: x  / Bili: x / Urobili: x   Blood: x / Protein: x / Nitrite: x   Leuk Esterase: x / RBC: x / WBC x   Sq Epi: x / Non Sq Epi: x / Bacteria: x

## 2024-10-09 PROCEDURE — 99231 SBSQ HOSP IP/OBS SF/LOW 25: CPT

## 2024-10-09 RX ORDER — PRENATAL VIT/IRON FUM/FOLIC AC 60 MG-1 MG
1 TABLET ORAL DAILY
Refills: 0 | Status: DISCONTINUED | OUTPATIENT
Start: 2024-10-09 | End: 2024-11-01

## 2024-10-09 RX ADMIN — HEPARIN SODIUM 5000 UNIT(S): 10000 INJECTION INTRAVENOUS; SUBCUTANEOUS at 21:30

## 2024-10-09 RX ADMIN — HEPARIN SODIUM 5000 UNIT(S): 10000 INJECTION INTRAVENOUS; SUBCUTANEOUS at 09:49

## 2024-10-09 RX ADMIN — Medication 30 MILLIGRAM(S): at 05:57

## 2024-10-09 NOTE — PROGRESS NOTE ADULT - NSPROGADDITIONALINFOA_GEN_ALL_CORE
24 y.o. P0 at 31w2d admitted for preeclampsia with severe features with prenatal care c/b severe FGR of baby B (EFW <1%, AC <1%, intermittent elevated dopplers). Overnight with intermittent contractions but no labor complaints this morning or symptoms of preeclampsia. Blood pressures well controlled on Nifedipine 30mg. Serum labs with no abnormalities with plan for q3d. This AM w/ reactive and reassuring NST x2. Plan for BPP again later this week with Dopplers. s/p BMZ (-), NICU consult, Mg. MOD: Primary  for fetal malpresentation. Candidate for rescue steroids if indicated. Inpatient monitoring until delivery at 34 weeks or sooner if clinically indicated (worsening uncontrolled blood pressures, end organ dysfunction, non-reassuring fetal status).     Seen and d/w Dr. Azeem Bear, MFM Fellow

## 2024-10-09 NOTE — PROGRESS NOTE ADULT - ASSESSMENT
24y  at 31w2d GA by LMP admitted as a transfer from [facility] due to sPEC in the setting of severe FGR of baby B. Pt is s/p Labetalol 20 IVP x1 at [facility] and has not required any more short acting antihypertensive medications. Pt with cramping overnight, cervix examined and found to be closed. Pt with no complaints this morning. Fetal statuses reassuring overnight.    #sPEC  - Tylenol and reglan PRN for headache  - cw BP monitoring  - HELLP q3d  - s/p Mg x24h  - s/p Lab 20  - Continue Procardia 30XL daily    #Fetal wellbeing  -ATU(10/7): A: breech, anterior placenta, MVP 5.23, BPP 8/8. B: back down head R, anterior placenta, MVP 3.75, BPP 8/8  -ATU (10/4): A: breech, maternal R, anterior placenta, 1494g, MVP 4.19, BPP 8/8. B: transverse head mat R, anterior placenta, MVP 3.39, 888g (<1%, AC <1%, intermittently elevated UAD), BPP 8/8  -ATU(): A: beech inferior maternal R, anterior placenta, MVP 6, BPP 8/8, UAD wnl. Fetus B: breech superior maternal L, anterior placenta, MVP 6.3, BPP 8/8, UAD wnl  - NST BID  - PNV  - BMZ (-)  - s/p NICU consult   - GBS: neg    #Maternal wellbeing  - T&S q3d  - regular diet   - HSQ for DVT prophylaxis    Maureen Shipman PGY3

## 2024-10-09 NOTE — DIETITIAN INITIAL EVALUATION ADULT - REASON
Nutrition focused physical exam not warranted at this time as pt is eating well and has not experienced any recent unintentional weight changes.

## 2024-10-09 NOTE — DIETITIAN INITIAL EVALUATION ADULT - ORAL INTAKE PTA/DIET HISTORY
Nutrition assessment completed at bedside. Pt reported good appetite and PO intake prior to admission, no recent significant changes. Pt endorsed weight gain prior to admission in setting of pregnancy, stated pre-pregnancy weight was 170 pounds, current dosing weight noted to be 184.9 pounds (9/29). Pt reported height of 5 feet 5 inches. Pt confirmed no known food allergies.

## 2024-10-09 NOTE — DIETITIAN INITIAL EVALUATION ADULT - ENERGY INTAKE
Currently in-house, pt reported good appetite and po intake. No recent changes. Pt seen with menu at bedside, endorsed ordering down for meals. Pt stated she is satisfied with current food options provided in-house, declined double portions at this time. Pt requesting Glucerna 2x/day in-house, stated she consumed it prior to admission at home. RD to honor as able.  Adequate (%)

## 2024-10-09 NOTE — DIETITIAN INITIAL EVALUATION ADULT - PERSON TAUGHT/METHOD
Discussed the importance of consuming adequate calories and proteins throughout the day. Encouraged consumption of HBV protein sources. Reviewed protein/calorie dense food sources/meal and snack ideas. Encouraged adequate hydration and educated on snacking between meals to optimize protein-energy intake. Pt with menu at bedside, aware of ordering procedures. Pt left with no further questions./verbal instruction/patient instructed

## 2024-10-09 NOTE — DIETITIAN INITIAL EVALUATION ADULT - PERTINENT LABORATORY DATA
10-08    133[L]  |  103  |  7   ----------------------------<  113[H]  3.6   |  17[L]  |  0.40[L]    Ca    9.6      08 Oct 2024 06:27    TPro  6.9  /  Alb  3.7  /  TBili  0.2  /  DBili  x   /  AST  14  /  ALT  12  /  AlkPhos  193[H]  10-08

## 2024-10-09 NOTE — DIETITIAN INITIAL EVALUATION ADULT - PERTINENT MEDS FT
MEDICATIONS  (STANDING):  heparin   Injectable 5000 Unit(s) SubCutaneous every 12 hours  influenza   Vaccine 0.5 milliLiter(s) IntraMuscular once  lactated ringers. 1000 milliLiter(s) (50 mL/Hr) IV Continuous <Continuous>  NIFEdipine XL 30 milliGRAM(s) Oral every 24 hours    MEDICATIONS  (PRN):  acetaminophen     Tablet .. 975 milliGRAM(s) Oral every 6 hours PRN Mild Pain (1 - 3), Moderate Pain (4 - 6)  metoclopramide 10 milliGRAM(s) Oral every 8 hours PRN headache  ondansetron Injectable 4 milliGRAM(s) IV Push every 8 hours PRN Nausea and/or Vomiting

## 2024-10-09 NOTE — DIETITIAN INITIAL EVALUATION ADULT - OTHER CALCULATIONS
Estimated protein-energy needs calculated using pre-pregnancy weight of 170 pounds with consideration for BMI >30, pt in third trimester.  Defer fluid calculations to the medical team.

## 2024-10-09 NOTE — DIETITIAN INITIAL EVALUATION ADULT - ADD RECOMMEND
1) Recommend continue current diet as tolerated: Regular. Defer diet texture/consistency to Medical Team.   2) Recommend adding prenatal multivitamin daily unless contraindicated for micronutrient coverage.  3) Monitor and encourage adequate PO intake. Pt with menu at bedside, declined double portions at this time. Recommend adding Glucerna 2x/day per patient request and to optimize protein-energy intake.   4) Monitor electrolytes, replete as needed.   5) Monitor nutritional intake, tolerance to diet prescription, bowel movements, weights, labs, and skin integrity.

## 2024-10-09 NOTE — PROGRESS NOTE ADULT - SUBJECTIVE AND OBJECTIVE BOX
PGY 3 Antepartum Note    Patient seen and examined at bedside in NAD. She felt some cramping overnight and was examined, found to be closed. She no longer complains of cramping. Denies any CTX, LOF or VB.  Reports good fetal movement. Pt reports having a headache overnight that resolved after she ambulated. Denies vision changes, RUQ/epigastric pain.    T(F): 97.8 (05:55)  HR: 82 (05:55)  BP: 125/84 (05:55)  RR: 18 (05:55)      Gen: NAD  Cardio: rrr, s1/s2  Pulm: ca b/l  Abd: gravid, nontender, no palpable contractions  Ext: no edema,  no calf enderness  SVE: deferred    Medications:    betamethasone Injectable: 12 milliGRAM(s) (09-30-24 @ 01:11)  heparin   Injectable: 5000 Unit(s) (10-08-24 @ 20:23),  5000 Unit(s) (10-08-24 @ 09:15),  5000 Unit(s) (10-07-24 @ 20:28),  5000 Unit(s) (10-07-24 @ 10:13),  5000 Unit(s) (10-06-24 @ 21:07),  5000 Unit(s) (10-06-24 @ 09:10),  5000 Unit(s) (10-05-24 @ 21:07),  5000 Unit(s) (10-05-24 @ 09:01),  5000 Unit(s) (10-04-24 @ 20:57),  5000 Unit(s) (10-04-24 @ 09:49),  5000 Unit(s) (10-03-24 @ 20:59),  5000 Unit(s) (10-03-24 @ 08:56),  5000 Unit(s) (10-02-24 @ 21:23),  5000 Unit(s) (10-02-24 @ 09:16),  5000 Unit(s) (10-01-24 @ 20:57),  5000 Unit(s) (10-01-24 @ 09:13),  5000 Unit(s) (09-30-24 @ 21:33),  5000 Unit(s) (09-30-24 @ 09:12)  lactated ringers Bolus: 1000 mL/Hr (10-08-24 @ 23:16)  magnesium sulfate Infusion: 50 mL/Hr (09-29-24 @ 10:09),  50 mL/Hr (09-29-24 @ 10:04)  metoclopramide: 10 milliGRAM(s) (10-04-24 @ 09:26)  NIFEdipine XL: 30 milliGRAM(s) (10-09-24 @ 05:57),  30 milliGRAM(s) (10-08-24 @ 05:52),  30 milliGRAM(s) (10-07-24 @ 05:45),  30 milliGRAM(s) (10-06-24 @ 06:20),  30 milliGRAM(s) (10-05-24 @ 06:14),  30 milliGRAM(s) (10-04-24 @ 05:39),  30 milliGRAM(s) (10-03-24 @ 06:05),  30 milliGRAM(s) (10-02-24 @ 06:14),  30 milliGRAM(s) (10-01-24 @ 05:17),  30 milliGRAM(s) (09-30-24 @ 05:14)      Labs:                        13.1   10.17 )-----------( 250      ( 08 Oct 2024 06:27 )             38.8     10-08    133[L]  |  103  |  7   ----------------------------<  113[H]  3.6   |  17[L]  |  0.40[L]    Ca    9.6      08 Oct 2024 06:27    TPro  6.9  /  Alb  3.7  /  TBili  0.2  /  DBili  x   /  AST  14  /  ALT  12  /  AlkPhos  193[H]  10-08      Urinalysis Basic - ( 08 Oct 2024 06:27 )    Color: x / Appearance: x / SG: x / pH: x  Gluc: 113 mg/dL / Ketone: x  / Bili: x / Urobili: x   Blood: x / Protein: x / Nitrite: x   Leuk Esterase: x / RBC: x / WBC x   Sq Epi: x / Non Sq Epi: x / Bacteria: x [Joint Pain] : joint pain [Joint Stiffness] : joint stiffness [Negative] : Heme/Lymph

## 2024-10-09 NOTE — DIETITIAN INITIAL EVALUATION ADULT - REASON INDICATOR FOR ASSESSMENT
RD assessment warranted for length of stay.   Source: Patient, Electronic Medical Record  Chart reviewed, events noted.

## 2024-10-09 NOTE — DIETITIAN INITIAL EVALUATION ADULT - OTHER INFO
Weights:  - Pre-Pregnancy Weight (per patient): 170 pounds  - Dosing Weight (per chart): 184.9 pounds   - Weight Gain (per chart): ~14 pounds   - Daily Weights (per flow sheets): No daily weights noted to assess at this time.   Pt endorsed weight gain prior to admission secondary to pregnancy. RD to continue to monitor weights and trends as able.     Nutritional Concerns:  - Pt is  at 31w2d GA (per chart).  - Pt is s/p BMZ - (per chart). Glucose 113 mg/dL (10/8).  - Ordered for IV Lactated Ringers (per orders).

## 2024-10-09 NOTE — DIETITIAN INITIAL EVALUATION ADULT - NSFNSGIIOFT_GEN_A_CORE
Pt reported no nausea, vomiting, diarrhea or constipation at this time.   - Ordered for Zofran (per orders).   Last BM: 10/8 (per patient)  Bowel Regimen: Pt not currently ordered for a bowel regimen at this time (per orders).   Continue to monitor bowel movements and bowel movement regularity.

## 2024-10-10 ENCOUNTER — ASOB RESULT (OUTPATIENT)
Age: 24
End: 2024-10-10

## 2024-10-10 ENCOUNTER — APPOINTMENT (OUTPATIENT)
Dept: ANTEPARTUM | Facility: CLINIC | Age: 24
End: 2024-10-10

## 2024-10-10 ENCOUNTER — APPOINTMENT (OUTPATIENT)
Dept: OBGYN | Facility: CLINIC | Age: 24
End: 2024-10-10

## 2024-10-10 ENCOUNTER — APPOINTMENT (OUTPATIENT)
Dept: ANTEPARTUM | Facility: CLINIC | Age: 24
End: 2024-10-10
Payer: COMMERCIAL

## 2024-10-10 PROCEDURE — 76818 FETAL BIOPHYS PROFILE W/NST: CPT | Mod: 26,59

## 2024-10-10 PROCEDURE — 99231 SBSQ HOSP IP/OBS SF/LOW 25: CPT

## 2024-10-10 PROCEDURE — 76820 UMBILICAL ARTERY ECHO: CPT | Mod: 26,59

## 2024-10-10 RX ADMIN — Medication 30 MILLIGRAM(S): at 05:59

## 2024-10-10 RX ADMIN — HEPARIN SODIUM 5000 UNIT(S): 10000 INJECTION INTRAVENOUS; SUBCUTANEOUS at 10:00

## 2024-10-10 RX ADMIN — HEPARIN SODIUM 5000 UNIT(S): 10000 INJECTION INTRAVENOUS; SUBCUTANEOUS at 21:24

## 2024-10-10 RX ADMIN — Medication 1 TABLET(S): at 12:23

## 2024-10-10 NOTE — PROGRESS NOTE ADULT - NSPROGADDITIONALINFOA_GEN_ALL_CORE
24 y.o. P0 at 31w3d admitted for preeclampsia with severe features with prenatal care c/b severe FGR of baby B (EFW <1%, AC <1%, intermittent elevated dopplers). No labor or preeclampsia complaints overnight. Blood pressures predominantly 120s with intermittent 140s on Nifedipine 30mg. Last labs 10/8 - repeat q3d.     This AM w/ reactive and reassuring NST x2. Plan for BPP with Dopplers today. Last growth 10/4 - plan for repeat growth q2 weeks. s/p BMZ (-), NICU consult, Mg. MOD: Primary  for fetal malpresentation (breech, transverse). Candidate for rescue steroids and Mg if indicated. Inpatient monitoring until delivery at 34 weeks or sooner if clinically indicated (worsening uncontrolled blood pressures, end organ dysfunction, non-reassuring fetal status).     Seen and d/w Dr. Azeem Bear, MFM Fellow

## 2024-10-10 NOTE — PROGRESS NOTE ADULT - ASSESSMENT
24y  at 31w3d GA by LMP admitted as a transfer from [facility] due to sPEC in the setting of severe FGR of baby B. Pt is s/p Labetalol 20 IVP x1 at [facility] and has not required any more short acting antihypertensive medications. Pt with cramping overnight, cervix examined and found to be closed. Pt with no complaints this morning. Fetal statuses reassuring overnight.    #sPEC  - Tylenol and reglan PRN for headache  - BP overnight 120-130/70-80  - HELLP wnl (repeat q3d)  - s/p Mg x24h  - s/p Lab 20  - Continue Procardia 30XL daily    #Fetal wellbeing  -ATU(10/7): A: breech, anterior placenta, MVP 5.23, BPP 8/8. B: back down head R, anterior placenta, MVP 3.75, BPP 8/8  -ATU (10/4): A: breech, maternal R, anterior placenta, 1494g, MVP 4.19, BPP 8/8. B: transverse head mat R, anterior placenta, MVP 3.39, 888g (<1%, AC <1%, intermittently elevated UAD), BPP 8/8  -ATU(): A: beech inferior maternal R, anterior placenta, MVP 6, BPP 8/8, UAD wnl. Fetus B: breech superior maternal L, anterior placenta, MVP 6.3, BPP 8/8, UAD wnl  - NST BID  - PNV  - s/p BMZ (-)  - s/p NICU consult   - GBS: neg    #Maternal wellbeing  - T&S q3d  - regular diet   - HSQ for DVT prophylaxis    Maureen Shipman PGY3

## 2024-10-11 ENCOUNTER — APPOINTMENT (OUTPATIENT)
Dept: ANTEPARTUM | Facility: CLINIC | Age: 24
End: 2024-10-11

## 2024-10-11 LAB
ADD ON TEST-SPECIMEN IN LAB: SIGNIFICANT CHANGE UP
ALBUMIN SERPL ELPH-MCNC: 3.6 G/DL — SIGNIFICANT CHANGE UP (ref 3.3–5)
ALP SERPL-CCNC: 197 U/L — HIGH (ref 40–120)
ALT FLD-CCNC: 10 U/L — SIGNIFICANT CHANGE UP (ref 10–45)
ANION GAP SERPL CALC-SCNC: 15 MMOL/L — SIGNIFICANT CHANGE UP (ref 5–17)
AST SERPL-CCNC: 18 U/L — SIGNIFICANT CHANGE UP (ref 10–40)
BASOPHILS # BLD AUTO: 0.04 K/UL — SIGNIFICANT CHANGE UP (ref 0–0.2)
BASOPHILS NFR BLD AUTO: 0.4 % — SIGNIFICANT CHANGE UP (ref 0–2)
BILIRUB SERPL-MCNC: 0.3 MG/DL — SIGNIFICANT CHANGE UP (ref 0.2–1.2)
BLD GP AB SCN SERPL QL: NEGATIVE — SIGNIFICANT CHANGE UP
BUN SERPL-MCNC: 8 MG/DL — SIGNIFICANT CHANGE UP (ref 7–23)
CALCIUM SERPL-MCNC: 9.6 MG/DL — SIGNIFICANT CHANGE UP (ref 8.4–10.5)
CHLORIDE SERPL-SCNC: 102 MMOL/L — SIGNIFICANT CHANGE UP (ref 96–108)
CO2 SERPL-SCNC: 19 MMOL/L — LOW (ref 22–31)
CREAT SERPL-MCNC: 0.39 MG/DL — LOW (ref 0.5–1.3)
EGFR: 141 ML/MIN/1.73M2 — SIGNIFICANT CHANGE UP
EOSINOPHIL # BLD AUTO: 0.13 K/UL — SIGNIFICANT CHANGE UP (ref 0–0.5)
EOSINOPHIL NFR BLD AUTO: 1.2 % — SIGNIFICANT CHANGE UP (ref 0–6)
GLUCOSE SERPL-MCNC: 90 MG/DL — SIGNIFICANT CHANGE UP (ref 70–99)
HCT VFR BLD CALC: 37.6 % — SIGNIFICANT CHANGE UP (ref 34.5–45)
HGB BLD-MCNC: 12.4 G/DL — SIGNIFICANT CHANGE UP (ref 11.5–15.5)
IMM GRANULOCYTES NFR BLD AUTO: 1 % — HIGH (ref 0–0.9)
LYMPHOCYTES # BLD AUTO: 18.4 % — SIGNIFICANT CHANGE UP (ref 13–44)
LYMPHOCYTES # BLD AUTO: 2.02 K/UL — SIGNIFICANT CHANGE UP (ref 1–3.3)
MCHC RBC-ENTMCNC: 27.1 PG — SIGNIFICANT CHANGE UP (ref 27–34)
MCHC RBC-ENTMCNC: 33 GM/DL — SIGNIFICANT CHANGE UP (ref 32–36)
MCV RBC AUTO: 82.3 FL — SIGNIFICANT CHANGE UP (ref 80–100)
MONOCYTES # BLD AUTO: 0.82 K/UL — SIGNIFICANT CHANGE UP (ref 0–0.9)
MONOCYTES NFR BLD AUTO: 7.5 % — SIGNIFICANT CHANGE UP (ref 2–14)
NEUTROPHILS # BLD AUTO: 7.83 K/UL — HIGH (ref 1.8–7.4)
NEUTROPHILS NFR BLD AUTO: 71.5 % — SIGNIFICANT CHANGE UP (ref 43–77)
NRBC # BLD: 0 /100 WBCS — SIGNIFICANT CHANGE UP (ref 0–0)
PLATELET # BLD AUTO: 225 K/UL — SIGNIFICANT CHANGE UP (ref 150–400)
POTASSIUM SERPL-MCNC: 3.6 MMOL/L — SIGNIFICANT CHANGE UP (ref 3.5–5.3)
POTASSIUM SERPL-SCNC: 3.6 MMOL/L — SIGNIFICANT CHANGE UP (ref 3.5–5.3)
PROT SERPL-MCNC: 6.6 G/DL — SIGNIFICANT CHANGE UP (ref 6–8.3)
RBC # BLD: 4.57 M/UL — SIGNIFICANT CHANGE UP (ref 3.8–5.2)
RBC # FLD: 12.7 % — SIGNIFICANT CHANGE UP (ref 10.3–14.5)
RH IG SCN BLD-IMP: POSITIVE — SIGNIFICANT CHANGE UP
SODIUM SERPL-SCNC: 136 MMOL/L — SIGNIFICANT CHANGE UP (ref 135–145)
URATE SERPL-MCNC: 4.3 MG/DL — SIGNIFICANT CHANGE UP (ref 2.5–7)
WBC # BLD: 10.95 K/UL — HIGH (ref 3.8–10.5)
WBC # FLD AUTO: 10.95 K/UL — HIGH (ref 3.8–10.5)

## 2024-10-11 PROCEDURE — 99231 SBSQ HOSP IP/OBS SF/LOW 25: CPT

## 2024-10-11 RX ADMIN — HEPARIN SODIUM 5000 UNIT(S): 10000 INJECTION INTRAVENOUS; SUBCUTANEOUS at 09:17

## 2024-10-11 RX ADMIN — Medication 30 MILLIGRAM(S): at 06:13

## 2024-10-11 RX ADMIN — HEPARIN SODIUM 5000 UNIT(S): 10000 INJECTION INTRAVENOUS; SUBCUTANEOUS at 21:00

## 2024-10-11 RX ADMIN — Medication 1 TABLET(S): at 11:43

## 2024-10-11 NOTE — PROGRESS NOTE ADULT - SUBJECTIVE AND OBJECTIVE BOX
PGY 3 Antepartum Note    Patient seen and examined at bedside in NAD. Denies any CTX, LOF or VB.  Reports good fetal movement. Denies headache, vision changes, RUQ/epigastric pain, fevers/chills.    T(F): 98.8 (06:09)  HR: 109 (06:09)  BP: 125/78 (06:09)  RR: 18 (06:09)      Gen: NAD  Cardio: rrr, s1/s2  Pulm: ca b/l  Abd: gravid, nontender, no palpable contractions  Ext: no edema,  no calf tenderness  SVE: deferred    Medications:    betamethasone Injectable: 12 milliGRAM(s) (09-30-24 @ 01:11)  heparin   Injectable: 5000 Unit(s) (10-10-24 @ 21:24),  5000 Unit(s) (10-10-24 @ 10:00),  5000 Unit(s) (10-09-24 @ 21:30),  5000 Unit(s) (10-09-24 @ 09:49),  5000 Unit(s) (10-08-24 @ 20:23),  5000 Unit(s) (10-08-24 @ 09:15),  5000 Unit(s) (10-07-24 @ 20:28),  5000 Unit(s) (10-07-24 @ 10:13),  5000 Unit(s) (10-06-24 @ 21:07),  5000 Unit(s) (10-06-24 @ 09:10),  5000 Unit(s) (10-05-24 @ 21:07),  5000 Unit(s) (10-05-24 @ 09:01),  5000 Unit(s) (10-04-24 @ 20:57),  5000 Unit(s) (10-04-24 @ 09:49),  5000 Unit(s) (10-03-24 @ 20:59),  5000 Unit(s) (10-03-24 @ 08:56),  5000 Unit(s) (10-02-24 @ 21:23),  5000 Unit(s) (10-02-24 @ 09:16),  5000 Unit(s) (10-01-24 @ 20:57),  5000 Unit(s) (10-01-24 @ 09:13),  5000 Unit(s) (09-30-24 @ 21:33),  5000 Unit(s) (09-30-24 @ 09:12)  lactated ringers Bolus: 1000 mL/Hr (10-08-24 @ 23:16)  magnesium sulfate Infusion: 50 mL/Hr (09-29-24 @ 10:09),  50 mL/Hr (09-29-24 @ 10:04)  metoclopramide: 10 milliGRAM(s) (10-04-24 @ 09:26)  NIFEdipine XL: 30 milliGRAM(s) (10-11-24 @ 06:13),  30 milliGRAM(s) (10-10-24 @ 05:59),  30 milliGRAM(s) (10-09-24 @ 05:57),  30 milliGRAM(s) (10-08-24 @ 05:52),  30 milliGRAM(s) (10-07-24 @ 05:45),  30 milliGRAM(s) (10-06-24 @ 06:20),  30 milliGRAM(s) (10-05-24 @ 06:14),  30 milliGRAM(s) (10-04-24 @ 05:39),  30 milliGRAM(s) (10-03-24 @ 06:05),  30 milliGRAM(s) (10-02-24 @ 06:14),  30 milliGRAM(s) (10-01-24 @ 05:17),  30 milliGRAM(s) (09-30-24 @ 05:14)  prenatal multivitamin: 1 Tablet(s) (10-10-24 @ 12:23)      Labs:                        12.4   10.95 )-----------( 225      ( 11 Oct 2024 06:46 )             37.6

## 2024-10-11 NOTE — PROGRESS NOTE ADULT - ASSESSMENT
24y  at 31w4d GA by LMP admitted as a transfer from [facility] due to sPEC in the setting of severe FGR of baby B. Pt is s/p Labetalol 20 IVP x1 at [facility] and has not required any more short acting antihypertensive medications. Pt with cramping overnight, cervix examined and found to be closed. Pt with no complaints this morning. Fetal statuses reassuring overnight.    #sPEC  - Tylenol and reglan PRN for headache  - BP overnight 120-130/80  - HELLP wnl (repeat q3d)  - s/p Mg x24h  - s/p Lab 20  - Continue Procardia 30XL daily    #Fetal wellbeing  -ATU(10/10): A: breech, anterior placenta, MVP 3.73, BPP 8/8, UAD wnl. B: transverse superior, back down maternal L, anterior placenta, MVP 3.31, BPP 8/8, UAD intermittently elevated  -ATU(10/7): A: breech, anterior placenta, MVP 5.23, BPP 8/8. B: back down head R, anterior placenta, MVP 3.75, BPP 8/8  -ATU (10/4): A: breech, maternal R, anterior placenta, 1494g, MVP 4.19, BPP 8/8. B: transverse head mat R, anterior placenta, MVP 3.39, 888g (<1%, AC <1%, intermittently elevated UAD), BPP 8/8  -ATU(): A: beech inferior maternal R, anterior placenta, MVP 6, BPP 8/8, UAD wnl. Fetus B: breech superior maternal L, anterior placenta, MVP 6.3, BPP 8/8, UAD wnl  - NST BID  - PNV  - s/p BMZ (-)  - s/p NICU consult   - GBS: neg    #Maternal wellbeing  - T&S q3d  - regular diet   - HSQ for DVT prophylaxis    Maureen Shipman PGY3

## 2024-10-11 NOTE — PROGRESS NOTE ADULT - NSPROGADDITIONALINFOA_GEN_ALL_CORE
24 y.o. P0 at 31w4d admitted for preeclampsia with severe features with prenatal care c/b severe FGR of baby B (EFW <1%, AC <1%, intermittent elevated dopplers). No labor or preeclampsia complaints overnight. Blood pressures predominantly 120s/70s-80s on Nifedipine 30mg. Labs reviewed and no serum abnormalities.     This AM w/ reactive and reassuring NST x2. Last BPP w/ Dopplers unchanged (10/10). Last growth 10/4 - plan for repeat growth q2 weeks. s/p BMZ (-), NICU consult, Mg. MOD: Primary  for fetal malpresentation (persistent breech, transverse). Candidate for rescue steroids and Mg if indicated. Inpatient monitoring until delivery at 34 weeks or sooner if clinically indicated (worsening uncontrolled blood pressures, end organ dysfunction, non-reassuring fetal status).     Seen and d/w Dr. Azeem Bear, MFM Fellow

## 2024-10-12 PROCEDURE — 99231 SBSQ HOSP IP/OBS SF/LOW 25: CPT

## 2024-10-12 RX ADMIN — Medication 1 TABLET(S): at 11:25

## 2024-10-12 RX ADMIN — HEPARIN SODIUM 5000 UNIT(S): 10000 INJECTION INTRAVENOUS; SUBCUTANEOUS at 09:12

## 2024-10-12 RX ADMIN — HEPARIN SODIUM 5000 UNIT(S): 10000 INJECTION INTRAVENOUS; SUBCUTANEOUS at 20:53

## 2024-10-12 RX ADMIN — Medication 30 MILLIGRAM(S): at 05:13

## 2024-10-12 NOTE — PROGRESS NOTE ADULT - SUBJECTIVE AND OBJECTIVE BOX
R3 Antepartum Note, HD#    Patient seen and examined at bedside, no acute overnight events. No acute complaints. Pt reports +FM, denies LOF, VB, ctx, HA, epigastric pain, blurred vision, CP, SOB, N/V, fevers, and chills.    Vital Signs Last 24 Hours  T(C): 36.8 (10-12-24 @ 00:55), Max: 37.1 (10-11-24 @ 06:09)  HR: 111 (10-12-24 @ 00:55) (108 - 124)  BP: 126/86 (10-12-24 @ 00:55) (107/74 - 147/93)  RR: 18 (10-12-24 @ 00:55) (18 - 18)  SpO2: 97% (10-12-24 @ 00:55) (95% - 98%)    CAPILLARY BLOOD GLUCOSE          Physical Exam:  General: NAD  Abdomen: Soft, non-tender, gravid  Ext: No pain or swelling    NST reactive overnight    Labs:             12.4   10.95 )-----------( 225      ( 10-11 @ 06:46 )             37.6     10-11 @ 06:46    136  |  102  |  8   ----------------------------<  90  3.6   |  19  |  0.39    Ca    9.6      10-11 @ 06:46    TPro  6.6  /  Alb  3.6  /  TBili  0.3  /  DBili  x   /  AST  18  /  ALT  10  /  AlkPhos  197  10-11 @ 06:46        Uric Acid: (10-11 @ 06:46)  4.3      Fibrinogen: (10-11 @ 06:46)  --       LDH: (10-11 @ 06:46)  --         MEDICATIONS  (STANDING):  heparin   Injectable 5000 Unit(s) SubCutaneous every 12 hours  influenza   Vaccine 0.5 milliLiter(s) IntraMuscular once  lactated ringers. 1000 milliLiter(s) (50 mL/Hr) IV Continuous <Continuous>  NIFEdipine XL 30 milliGRAM(s) Oral every 24 hours  prenatal multivitamin 1 Tablet(s) Oral daily    MEDICATIONS  (PRN):  acetaminophen     Tablet .. 975 milliGRAM(s) Oral every 6 hours PRN Mild Pain (1 - 3), Moderate Pain (4 - 6)  metoclopramide 10 milliGRAM(s) Oral every 8 hours PRN headache  ondansetron Injectable 4 milliGRAM(s) IV Push every 8 hours PRN Nausea and/or Vomiting

## 2024-10-12 NOTE — PROGRESS NOTE ADULT - ASSESSMENT
24y  at 31w5d GA by LMP admitted as a transfer from [facility] due to sPEC in the setting of severe FGR of baby B. Pt is s/p Labetalol 20 IVP x1 at [facility] and has not required any more short acting antihypertensive medications. Pt with no complaints this morning. Fetal statuses reassuring overnight.    #sPEC  - Tylenol and reglan PRN for headache  -cw BP monitoringBP: 126/86 (10--24 @ 00:55) (107/74 - 147/93)  - HELLP wnl (repeat q3d)  - s/p Mg x24h  - s/p Lab 20  - Continue Procardia 30XL daily    #Fetal wellbeing  -ATU(10/10): A: breech, anterior placenta, MVP 3.73, BPP 8/8, UAD wnl. B: transverse superior, back down maternal L, anterior placenta, MVP 3.31, BPP 8/8, UAD intermittently elevated  -ATU(10/7): A: breech, anterior placenta, MVP 5.23, BPP 8/8. B: back down head R, anterior placenta, MVP 3.75, BPP 8/8  -ATU (10/4): A: breech, maternal R, anterior placenta, 1494g, MVP 4.19, BPP 8/8. B: transverse head mat R, anterior placenta, MVP 3.39, 888g (<1%, AC <1%, intermittently elevated UAD), BPP 8/8  -ATU(): A: beech inferior maternal R, anterior placenta, MVP 6, BPP 8/8, UAD wnl. Fetus B: breech superior maternal L, anterior placenta, MVP 6.3, BPP 8/8, UAD wnl  - NST BID  - PNV  - s/p BMZ (-)  - s/p NICU consult   - GBS: neg    #Maternal wellbeing  - T&S q3d  - regular diet   - HSQ for DVT prophylaxis    DTaveras PGY3

## 2024-10-13 PROCEDURE — 99231 SBSQ HOSP IP/OBS SF/LOW 25: CPT

## 2024-10-13 RX ADMIN — Medication 1 TABLET(S): at 12:36

## 2024-10-13 RX ADMIN — HEPARIN SODIUM 5000 UNIT(S): 10000 INJECTION INTRAVENOUS; SUBCUTANEOUS at 23:51

## 2024-10-13 RX ADMIN — Medication 30 MILLIGRAM(S): at 05:39

## 2024-10-13 RX ADMIN — HEPARIN SODIUM 5000 UNIT(S): 10000 INJECTION INTRAVENOUS; SUBCUTANEOUS at 09:32

## 2024-10-13 NOTE — PROGRESS NOTE ADULT - ASSESSMENT
24y  at 31w6d GA by LMP admitted as a transfer from [facility] due to sPEC in the setting of severe FGR of baby B. Pt is s/p Labetalol 20 IVP x1 at [facility] and has not required any more short acting antihypertensive medications. Pt with no complaints this morning. Fetal statuses reassuring overnight.    #sPEC  - Tylenol and reglan PRN for headache  -cw BP monitoringBP: 126/86 (10--24 @ 00:55) (107/74 - 147/93)  - HELLP wnl (repeat q3d)  - s/p Mg x24h  - s/p Lab 20  - Continue Procardia 30XL daily    #Fetal wellbeing  -ATU(10/10): A: breech, anterior placenta, MVP 3.73, BPP 8/8, UAD wnl. B: transverse superior, back down maternal L, anterior placenta, MVP 3.31, BPP 8/8, UAD intermittently elevated  -ATU(10/7): A: breech, anterior placenta, MVP 5.23, BPP 8/8. B: back down head R, anterior placenta, MVP 3.75, BPP 8/8  -ATU (10/4): A: breech, maternal R, anterior placenta, 1494g, MVP 4.19, BPP 8/8. B: transverse head mat R, anterior placenta, MVP 3.39, 888g (<1%, AC <1%, intermittently elevated UAD), BPP 8/8  -ATU(): A: beech inferior maternal R, anterior placenta, MVP 6, BPP 8/8, UAD wnl. Fetus B: breech superior maternal L, anterior placenta, MVP 6.3, BPP 8/8, UAD wnl  - NST BID  - PNV  - s/p BMZ (-)  - s/p NICU consult   - GBS: neg    #Maternal wellbeing  - T&S q3d  - regular diet   - HSQ for DVT prophylaxis    Jagjit Lou PGY3

## 2024-10-13 NOTE — PROGRESS NOTE ADULT - SUBJECTIVE AND OBJECTIVE BOX
R3 Antepartum Note, HD#15    Patient seen and examined at bedside, no acute overnight events. No acute complaints. Pt reports +FM, denies LOF, VB, ctx, HA, epigastric pain, blurred vision, CP, SOB, N/V, fevers, and chills.    Vital Signs Last 24 Hrs  T(C): 36.8 (13 Oct 2024 00:18), Max: 37.1 (12 Oct 2024 17:15)  T(F): 98.3 (13 Oct 2024 00:18), Max: 98.8 (12 Oct 2024 17:15)  HR: 109 (13 Oct 2024 00:18) (107 - 113)  BP: 136/88 (13 Oct 2024 00:18) (114/73 - 143/88)  RR: 18 (13 Oct 2024 00:18) (18 - 18)  SpO2: 98% (13 Oct 2024 00:18) (96% - 98%)    Parameters below as of 13 Oct 2024 00:18  Patient On (Oxygen Delivery Method): room air    Physical Exam:  General: NAD  Abdomen: Soft, non-tender, gravid  Ext: No pain or swelling    NST reactive overnight    Labs:             12.4   10.95 )-----------( 225      ( 10-11 @ 06:46 )             37.6     10-11 @ 06:46    136  |  102  |  8   ----------------------------<  90  3.6   |  19  |  0.39    Ca    9.6      10-11 @ 06:46    TPro  6.6  /  Alb  3.6  /  TBili  0.3  /  DBili  x   /  AST  18  /  ALT  10  /  AlkPhos  197  10-11 @ 06:46      Uric Acid: (10-11 @ 06:46)  4.3      Fibrinogen: (10-11 @ 06:46)  --       LDH: (10-11 @ 06:46)  --         MEDICATIONS  (STANDING):  heparin   Injectable 5000 Unit(s) SubCutaneous every 12 hours  influenza   Vaccine 0.5 milliLiter(s) IntraMuscular once  lactated ringers. 1000 milliLiter(s) (50 mL/Hr) IV Continuous <Continuous>  NIFEdipine XL 30 milliGRAM(s) Oral every 24 hours  prenatal multivitamin 1 Tablet(s) Oral daily    MEDICATIONS  (PRN):  acetaminophen     Tablet .. 975 milliGRAM(s) Oral every 6 hours PRN Mild Pain (1 - 3), Moderate Pain (4 - 6)  metoclopramide 10 milliGRAM(s) Oral every 8 hours PRN headache  ondansetron Injectable 4 milliGRAM(s) IV Push every 8 hours PRN Nausea and/or Vomiting

## 2024-10-14 LAB
ALBUMIN SERPL ELPH-MCNC: 3.6 G/DL — SIGNIFICANT CHANGE UP (ref 3.3–5)
ALP SERPL-CCNC: 192 U/L — HIGH (ref 40–120)
ALT FLD-CCNC: 6 U/L — LOW (ref 10–45)
ANION GAP SERPL CALC-SCNC: 13 MMOL/L — SIGNIFICANT CHANGE UP (ref 5–17)
AST SERPL-CCNC: 11 U/L — SIGNIFICANT CHANGE UP (ref 10–40)
BASOPHILS # BLD AUTO: 0.04 K/UL — SIGNIFICANT CHANGE UP (ref 0–0.2)
BASOPHILS NFR BLD AUTO: 0.4 % — SIGNIFICANT CHANGE UP (ref 0–2)
BILIRUB SERPL-MCNC: 0.3 MG/DL — SIGNIFICANT CHANGE UP (ref 0.2–1.2)
BLD GP AB SCN SERPL QL: NEGATIVE — SIGNIFICANT CHANGE UP
BUN SERPL-MCNC: 7 MG/DL — SIGNIFICANT CHANGE UP (ref 7–23)
CALCIUM SERPL-MCNC: 9.5 MG/DL — SIGNIFICANT CHANGE UP (ref 8.4–10.5)
CHLORIDE SERPL-SCNC: 102 MMOL/L — SIGNIFICANT CHANGE UP (ref 96–108)
CO2 SERPL-SCNC: 20 MMOL/L — LOW (ref 22–31)
CREAT SERPL-MCNC: 0.42 MG/DL — LOW (ref 0.5–1.3)
EGFR: 138 ML/MIN/1.73M2 — SIGNIFICANT CHANGE UP
EOSINOPHIL # BLD AUTO: 0.13 K/UL — SIGNIFICANT CHANGE UP (ref 0–0.5)
EOSINOPHIL NFR BLD AUTO: 1.3 % — SIGNIFICANT CHANGE UP (ref 0–6)
GLUCOSE SERPL-MCNC: 85 MG/DL — SIGNIFICANT CHANGE UP (ref 70–99)
HCT VFR BLD CALC: 38.4 % — SIGNIFICANT CHANGE UP (ref 34.5–45)
HGB BLD-MCNC: 12.2 G/DL — SIGNIFICANT CHANGE UP (ref 11.5–15.5)
IMM GRANULOCYTES NFR BLD AUTO: 0.9 % — SIGNIFICANT CHANGE UP (ref 0–0.9)
LDH SERPL L TO P-CCNC: 155 U/L — SIGNIFICANT CHANGE UP (ref 50–242)
LYMPHOCYTES # BLD AUTO: 1.76 K/UL — SIGNIFICANT CHANGE UP (ref 1–3.3)
LYMPHOCYTES # BLD AUTO: 18.2 % — SIGNIFICANT CHANGE UP (ref 13–44)
MCHC RBC-ENTMCNC: 26.9 PG — LOW (ref 27–34)
MCHC RBC-ENTMCNC: 31.8 GM/DL — LOW (ref 32–36)
MCV RBC AUTO: 84.8 FL — SIGNIFICANT CHANGE UP (ref 80–100)
MONOCYTES # BLD AUTO: 0.81 K/UL — SIGNIFICANT CHANGE UP (ref 0–0.9)
MONOCYTES NFR BLD AUTO: 8.4 % — SIGNIFICANT CHANGE UP (ref 2–14)
NEUTROPHILS # BLD AUTO: 6.86 K/UL — SIGNIFICANT CHANGE UP (ref 1.8–7.4)
NEUTROPHILS NFR BLD AUTO: 70.8 % — SIGNIFICANT CHANGE UP (ref 43–77)
NRBC # BLD: 0 /100 WBCS — SIGNIFICANT CHANGE UP (ref 0–0)
PLATELET # BLD AUTO: 204 K/UL — SIGNIFICANT CHANGE UP (ref 150–400)
POTASSIUM SERPL-MCNC: 3.8 MMOL/L — SIGNIFICANT CHANGE UP (ref 3.5–5.3)
POTASSIUM SERPL-SCNC: 3.8 MMOL/L — SIGNIFICANT CHANGE UP (ref 3.5–5.3)
PROT SERPL-MCNC: 6.6 G/DL — SIGNIFICANT CHANGE UP (ref 6–8.3)
RBC # BLD: 4.53 M/UL — SIGNIFICANT CHANGE UP (ref 3.8–5.2)
RBC # FLD: 13.1 % — SIGNIFICANT CHANGE UP (ref 10.3–14.5)
RH IG SCN BLD-IMP: POSITIVE — SIGNIFICANT CHANGE UP
SODIUM SERPL-SCNC: 135 MMOL/L — SIGNIFICANT CHANGE UP (ref 135–145)
URATE SERPL-MCNC: 4 MG/DL — SIGNIFICANT CHANGE UP (ref 2.5–7)
WBC # BLD: 9.69 K/UL — SIGNIFICANT CHANGE UP (ref 3.8–10.5)
WBC # FLD AUTO: 9.69 K/UL — SIGNIFICANT CHANGE UP (ref 3.8–10.5)

## 2024-10-14 PROCEDURE — 99232 SBSQ HOSP IP/OBS MODERATE 35: CPT

## 2024-10-14 RX ADMIN — HEPARIN SODIUM 5000 UNIT(S): 10000 INJECTION INTRAVENOUS; SUBCUTANEOUS at 09:27

## 2024-10-14 RX ADMIN — Medication 1 TABLET(S): at 11:45

## 2024-10-14 RX ADMIN — HEPARIN SODIUM 5000 UNIT(S): 10000 INJECTION INTRAVENOUS; SUBCUTANEOUS at 20:15

## 2024-10-14 RX ADMIN — Medication 30 MILLIGRAM(S): at 05:26

## 2024-10-14 NOTE — PROGRESS NOTE ADULT - SUBJECTIVE AND OBJECTIVE BOX
R3 Antepartum Note, HD#16     Patient seen and examined at bedside, no acute overnight events. No acute complaints.   Pt reports +FM, denies LOF, VB, or ctx  Denies HA, epigastric pain, blurred vision, CP, or shortness of breath    Vital Signs Last 24 Hours  T(C): 36.8 (10-14-24 @ 05:33), Max: 37.4 (10-13-24 @ 17:04)  HR: 98 (10-14-24 @ 05:33) (96 - 113)  BP: 111/67 (10-14-24 @ 05:33) (111/67 - 137/84)  RR: 18 (10-14-24 @ 05:33) (18 - 18)  SpO2: 95% (10-14-24 @ 05:33) (95% - 98%)    CAPILLARY BLOOD GLUCOSE          Physical Exam:  General: No acute distress. Resting comfortably  Pulm: Unlabored breathing. No respiratory distress   Abdomen: Soft. Non-tender. Gravid   Ext: No calf tenderness bilaterally        Labs:             12.2   9.69  )-----------( 204      ( 10-14 @ 06:22 )             38.4     10-14 @ 06:23    135  |  102  |  7   ----------------------------<  85  3.8   |  20  |  0.42    Ca    9.5      10-14 @ 06:23    TPro  6.6  /  Alb  3.6  /  TBili  0.3  /  DBili  x   /  AST  11  /  ALT  6   /  AlkPhos  192  10-14 @ 06:23        Uric Acid: (10-14 @ 06:23)  4.0      Fibrinogen: (10-14 @ 06:23)  --       LDH: (10-14 @ 06:23)  155        MEDICATIONS  (STANDING):  heparin   Injectable 5000 Unit(s) SubCutaneous every 12 hours  influenza   Vaccine 0.5 milliLiter(s) IntraMuscular once  lactated ringers. 1000 milliLiter(s) (50 mL/Hr) IV Continuous <Continuous>  NIFEdipine XL 30 milliGRAM(s) Oral every 24 hours  prenatal multivitamin 1 Tablet(s) Oral daily    MEDICATIONS  (PRN):  acetaminophen     Tablet .. 975 milliGRAM(s) Oral every 6 hours PRN Mild Pain (1 - 3), Moderate Pain (4 - 6)  metoclopramide 10 milliGRAM(s) Oral every 8 hours PRN headache  ondansetron Injectable 4 milliGRAM(s) IV Push every 8 hours PRN Nausea and/or Vomiting

## 2024-10-14 NOTE — PROGRESS NOTE ADULT - NSPROGADDITIONALINFOA_GEN_ALL_CORE
24 y.o. P0 at 32w0d w/ di-di twins admitted for preeclampsia with severe features with pregnancy c/b severe FGR of baby B (EFW <1%, AC <1%, intermittent elevated dopplers). No labor or preeclampsia complaints overnight. Blood pressures predominantly 120s/70s-80s on Nifedipine 30mg with no regimen change since admission.     This AM w/ reactive and reassuring NST x2. Last BPP w/ Dopplers unchanged (10/10). Last growth 10/4 - plan for repeat growth q2 weeks. s/p BMZ (-), NICU consult, Mg. MOD: Primary  for fetal malpresentation (persistent breech, transverse). Candidate for rescue steroids. Inpatient monitoring until delivery at 34 weeks or sooner if clinically indicated (worsening uncontrolled blood pressures, end organ dysfunction, non-reassuring fetal status). Will reach out to schedule for primary  section.    Seen and d/w Dr. Aden Bear, FERMINM Fellow

## 2024-10-15 ENCOUNTER — ASOB RESULT (OUTPATIENT)
Age: 24
End: 2024-10-15

## 2024-10-15 ENCOUNTER — APPOINTMENT (OUTPATIENT)
Dept: ANTEPARTUM | Facility: CLINIC | Age: 24
End: 2024-10-15

## 2024-10-15 PROCEDURE — 99231 SBSQ HOSP IP/OBS SF/LOW 25: CPT

## 2024-10-15 RX ADMIN — HEPARIN SODIUM 5000 UNIT(S): 10000 INJECTION INTRAVENOUS; SUBCUTANEOUS at 20:51

## 2024-10-15 RX ADMIN — Medication 30 MILLIGRAM(S): at 06:07

## 2024-10-15 RX ADMIN — Medication 1 TABLET(S): at 13:06

## 2024-10-15 RX ADMIN — HEPARIN SODIUM 5000 UNIT(S): 10000 INJECTION INTRAVENOUS; SUBCUTANEOUS at 09:44

## 2024-10-15 NOTE — PROGRESS NOTE ADULT - ASSESSMENT
24y  at 32w1d GA by LMP admitted as a transfer from [facility] due to sPEC in the setting of severe FGR of baby B. Pt s/p Labetalol 20 IVP x1 at [facility] and has not required any more short acting antihypertensive medications during her inpatient hospital course. Patient was without complaints this morning     #Preeclampsia with severe features   - Tylenol and reglan PRN for headache. Patient asymptomatic this morning  -cw BP monitorin-135/69-85 overnight  - HELLP wnl (repeat q3d). Next due 10/17  - s/p Mg x24h  - s/p Lab 20  - Continue Procardia 30XL daily  - Patient is scheduled for pCS on 10/28 pending any changes in clinical status     #Fetal wellbeing  - Will coordinate for BPP today pending sonographer availability   -ATU(10/10): A: breech, anterior placenta, MVP 3.73, BPP 8/8, UAD wnl. B: transverse superior, back down maternal L, anterior placenta, MVP 3.31, BPP 8/8, UAD intermittently elevated  -ATU(10/7): A: breech, anterior placenta, MVP 5.23, BPP 8/8. B: back down head R, anterior placenta, MVP 3.75, BPP 8/8  -ATU (10/4): A: breech, maternal R, anterior placenta, 1494g, MVP 4.19, BPP 8/8. B: transverse head mat R, anterior placenta, MVP 3.39, 888g (<1%, AC <1%, intermittently elevated UAD), BPP 8/8  -ATU(): A: beech inferior maternal R, anterior placenta, MVP 6, BPP 8/8, UAD wnl. Fetus B: breech superior maternal L, anterior placenta, MVP 6.3, BPP 8/8, UAD wnl  - NST BID  - PNV  - s/p BMZ (-)  - s/p NICU consult   - GBS: neg    #Maternal wellbeing  - T&S q3d  - regular diet   - HSQ for DVT prophylaxis    Edilberto Bolden, PGY-3  Obstetrics & Gynecology

## 2024-10-15 NOTE — PROGRESS NOTE ADULT - SUBJECTIVE AND OBJECTIVE BOX
R3 Antepartum Note, HD#17     Patient seen and examined at bedside, no acute overnight events. No acute complaints.   Pt reports +FM, denies LOF, VB, or ctx  Denies HA, epigastric pain, blurred vision, CP, SOB, or n/v    Vital Signs Last 24 Hours  T(C): 36.9 (10-15-24 @ 06:08), Max: 37.1 (10-14-24 @ 13:35)  HR: 107 (10-15-24 @ 06:08) (99 - 107)  BP: 133/85 (10-15-24 @ 06:08) (107/69 - 136/84)  RR: 18 (10-15-24 @ 06:08) (18 - 18)  SpO2: 98% (10-15-24 @ 06:08) (96% - 98%)    CAPILLARY BLOOD GLUCOSE          Physical Exam:  General: No acute distress. Resting comfortably  Pulm: Unlabored breathing. No respiratory distress   Abdomen: Soft. Non-tender. Gravid   Ext: No calf tenderness bilaterally        Labs:             12.2   9.69  )-----------( 204      ( 10-14 @ 06:22 )             38.4     10-14 @ 06:23    135  |  102  |  7   ----------------------------<  85  3.8   |  20  |  0.42    Ca    9.5      10-14 @ 06:23    TPro  6.6  /  Alb  3.6  /  TBili  0.3  /  DBili  x   /  AST  11  /  ALT  6   /  AlkPhos  192  10-14 @ 06:23        Uric Acid: (10-14 @ 06:23)  4.0      Fibrinogen: (10-14 @ 06:23)  --       LDH: (10-14 @ 06:23)  155        MEDICATIONS  (STANDING):  heparin   Injectable 5000 Unit(s) SubCutaneous every 12 hours  influenza   Vaccine 0.5 milliLiter(s) IntraMuscular once  lactated ringers. 1000 milliLiter(s) (50 mL/Hr) IV Continuous <Continuous>  NIFEdipine XL 30 milliGRAM(s) Oral every 24 hours  prenatal multivitamin 1 Tablet(s) Oral daily    MEDICATIONS  (PRN):  acetaminophen     Tablet .. 975 milliGRAM(s) Oral every 6 hours PRN Mild Pain (1 - 3), Moderate Pain (4 - 6)  metoclopramide 10 milliGRAM(s) Oral every 8 hours PRN headache  ondansetron Injectable 4 milliGRAM(s) IV Push every 8 hours PRN Nausea and/or Vomiting

## 2024-10-15 NOTE — PROGRESS NOTE ADULT - NSPROGADDITIONALINFOA_GEN_ALL_CORE
24 y.o. P0 at 32w0d w/ di-di twins admitted for preeclampsia with severe features with pregnancy c/b severe FGR of baby B (EFW <1%, AC <1%, intermittent elevated dopplers). No labor or preeclampsia complaints overnight. Blood pressures predominantly 120s/70s-80s on Nifedipine 30mg with no regimen change since admission. This AM w/ reactive and reassuring NST x2. Plan for BPP/Dopplers today. Last growth 10/4 - plan for repeat growth q2 weeks (next 10/18). s/p BMZ (-), NICU consult, Mg. MOD: Primary  for fetal malpresentation (persistent breech, transverse). Candidate for rescue steroids. Inpatient monitoring until delivery at 34 weeks or sooner if clinically indicated (worsening uncontrolled blood pressures, end organ dysfunction, non-reassuring fetal status). BCM: considering IUD at 6w visit. Section scheduled for ***    Seen and d/w Dr. Aden Bear, MFM Fellow 24 y.o. P0 at 32w0d w/ di-di twins admitted for preeclampsia with severe features with pregnancy c/b severe FGR of baby B (EFW <1%, AC <1%, intermittent elevated dopplers). No labor or preeclampsia complaints overnight. Blood pressures predominantly 120s/70s-80s on Nifedipine 30mg with no regimen change since admission. This AM w/ reactive and reassuring NST x2. Plan for BPP/Dopplers today. Last growth 10/4 - plan for repeat growth q2 weeks (next 10/18). s/p BMZ (-), NICU consult, Mg. MOD: Primary  for fetal malpresentation (persistent breech, transverse). Candidate for rescue steroids. Inpatient monitoring until delivery at 34 weeks or sooner if clinically indicated (worsening uncontrolled blood pressures, end organ dysfunction, non-reassuring fetal status). BCM: considering IUD at 6w visit. Section to be scheduled for 34w0d.    Seen and d/w Dr. Aden Bear, FERMINM Fellow

## 2024-10-16 PROCEDURE — 99231 SBSQ HOSP IP/OBS SF/LOW 25: CPT

## 2024-10-16 RX ADMIN — HEPARIN SODIUM 5000 UNIT(S): 10000 INJECTION INTRAVENOUS; SUBCUTANEOUS at 21:42

## 2024-10-16 RX ADMIN — HEPARIN SODIUM 5000 UNIT(S): 10000 INJECTION INTRAVENOUS; SUBCUTANEOUS at 09:37

## 2024-10-16 RX ADMIN — Medication 1 TABLET(S): at 13:28

## 2024-10-16 RX ADMIN — Medication 30 MILLIGRAM(S): at 06:23

## 2024-10-16 NOTE — PROGRESS NOTE ADULT - ASSESSMENT
24y  at 32w2d GA by LMP admitted as a transfer from [facility] due to sPEC in the setting of severe FGR of baby B. Pt s/p Labetalol 20 IVP x1 at [facility] and has not required any more short acting antihypertensive medications during her inpatient hospital course. Patient was without complaints this morning     #Preeclampsia with severe features   - Tylenol and reglan PRN for headache. Patient asymptomatic this morning  -cw BP monitorin-131/64-85 overnight  - HELLP wnl (repeat q3d). Next due 10/17  - s/p Mg x24h  - s/p Lab 20  - Continue Procardia 30XL daily  - Patient is scheduled for pCS on 10/28 pending any changes in clinical status     #Fetal wellbeing  - Will coordinate for BPP today pending sonographer availability   -ATU (10/15): A: breech, ant plac, MVP 4.69,BPP 8/8:A:Transverse, back up, head maternal left, ant plac,MVP 3.14,BPP 8/8, UAD intermittently elevated  -ATU(10/10): A: breech, anterior placenta, MVP 3.73, BPP 8/8, UAD wnl. B: transverse superior, back down maternal L, anterior placenta, MVP 3.31, BPP 8/8, UAD intermittently elevated  -ATU(10/7): A: breech, anterior placenta, MVP 5.23, BPP 8/8. B: back down head R, anterior placenta, MVP 3.75, BPP 8/8  -ATU (10/4): A: breech, maternal R, anterior placenta, 1494g, MVP 4.19, BPP 8/8. B: transverse head mat R, anterior placenta, MVP 3.39, 888g (<1%, AC <1%, intermittently elevated UAD), BPP 8/8  -ATU(): A: beech inferior maternal R, anterior placenta, MVP 6, BPP 8/8, UAD wnl. Fetus B: breech superior maternal L, anterior placenta, MVP 6.3, BPP 8/8, UAD wnl  - NST BID  - PNV  - s/p BMZ (-)  - s/p NICU consult   - GBS: neg    #Maternal wellbeing  - T&S q3d  - regular diet   - HSQ for DVT prophylaxis    Edilberto Bolden, PGY-3  Obstetrics & Gynecology

## 2024-10-16 NOTE — PROGRESS NOTE ADULT - SUBJECTIVE AND OBJECTIVE BOX
R3 Antepartum Note, HD#18     Patient seen and examined at bedside, no acute overnight events. No acute complaints.   Pt reports +FM, denies LOF, VB, or ctx  Denies HA, epigastric pain, blurred vision, CP, SOB, or n/v    Vital Signs Last 24 Hours  T(C): 36.7 (10-16-24 @ 05:24), Max: 37.1 (10-16-24 @ 00:47)  HR: 109 (10-16-24 @ 05:24) (102 - 115)  BP: 118/72 (10-16-24 @ 05:24) (117/64 - 134/83)  RR: 18 (10-16-24 @ 05:24) (18 - 18)  SpO2: 96% (10-16-24 @ 05:24) (96% - 98%)    CAPILLARY BLOOD GLUCOSE          Physical Exam:  General: No acute distress. Resting comfortably  Pulm: Unlabored breathing. No respiratory distress   Abdomen: Soft. Non-tender. Gravid   Ext: No calf tenderness bilaterally        Labs:            Uric Acid: (10-14 @ 06:23)  4.0      Fibrinogen: (10-14 @ 06:23)  --       LDH: (10-14 @ 06:23)  155        MEDICATIONS  (STANDING):  heparin   Injectable 5000 Unit(s) SubCutaneous every 12 hours  influenza   Vaccine 0.5 milliLiter(s) IntraMuscular once  lactated ringers. 1000 milliLiter(s) (50 mL/Hr) IV Continuous <Continuous>  NIFEdipine XL 30 milliGRAM(s) Oral every 24 hours  prenatal multivitamin 1 Tablet(s) Oral daily    MEDICATIONS  (PRN):  acetaminophen     Tablet .. 975 milliGRAM(s) Oral every 6 hours PRN Mild Pain (1 - 3), Moderate Pain (4 - 6)  metoclopramide 10 milliGRAM(s) Oral every 8 hours PRN headache  ondansetron Injectable 4 milliGRAM(s) IV Push every 8 hours PRN Nausea and/or Vomiting

## 2024-10-17 ENCOUNTER — NON-APPOINTMENT (OUTPATIENT)
Age: 24
End: 2024-10-17

## 2024-10-17 ENCOUNTER — APPOINTMENT (OUTPATIENT)
Dept: ANTEPARTUM | Facility: CLINIC | Age: 24
End: 2024-10-17

## 2024-10-17 ENCOUNTER — ASOB RESULT (OUTPATIENT)
Age: 24
End: 2024-10-17

## 2024-10-17 LAB
ALBUMIN SERPL ELPH-MCNC: 3.4 G/DL — SIGNIFICANT CHANGE UP (ref 3.3–5)
ALP SERPL-CCNC: 195 U/L — HIGH (ref 40–120)
ALT FLD-CCNC: 10 U/L — SIGNIFICANT CHANGE UP (ref 10–45)
ANION GAP SERPL CALC-SCNC: 14 MMOL/L — SIGNIFICANT CHANGE UP (ref 5–17)
AST SERPL-CCNC: 12 U/L — SIGNIFICANT CHANGE UP (ref 10–40)
BASOPHILS # BLD AUTO: 0.03 K/UL — SIGNIFICANT CHANGE UP (ref 0–0.2)
BASOPHILS NFR BLD AUTO: 0.3 % — SIGNIFICANT CHANGE UP (ref 0–2)
BILIRUB SERPL-MCNC: 0.2 MG/DL — SIGNIFICANT CHANGE UP (ref 0.2–1.2)
BLD GP AB SCN SERPL QL: NEGATIVE — SIGNIFICANT CHANGE UP
BUN SERPL-MCNC: 7 MG/DL — SIGNIFICANT CHANGE UP (ref 7–23)
CALCIUM SERPL-MCNC: 9.4 MG/DL — SIGNIFICANT CHANGE UP (ref 8.4–10.5)
CHLORIDE SERPL-SCNC: 103 MMOL/L — SIGNIFICANT CHANGE UP (ref 96–108)
CO2 SERPL-SCNC: 18 MMOL/L — LOW (ref 22–31)
CREAT SERPL-MCNC: 0.37 MG/DL — LOW (ref 0.5–1.3)
EGFR: 143 ML/MIN/1.73M2 — SIGNIFICANT CHANGE UP
EOSINOPHIL # BLD AUTO: 0.11 K/UL — SIGNIFICANT CHANGE UP (ref 0–0.5)
EOSINOPHIL NFR BLD AUTO: 1.2 % — SIGNIFICANT CHANGE UP (ref 0–6)
GLUCOSE SERPL-MCNC: 79 MG/DL — SIGNIFICANT CHANGE UP (ref 70–99)
HCT VFR BLD CALC: 37.5 % — SIGNIFICANT CHANGE UP (ref 34.5–45)
HGB BLD-MCNC: 12.4 G/DL — SIGNIFICANT CHANGE UP (ref 11.5–15.5)
IMM GRANULOCYTES NFR BLD AUTO: 0.9 % — SIGNIFICANT CHANGE UP (ref 0–0.9)
LDH SERPL L TO P-CCNC: 153 U/L — SIGNIFICANT CHANGE UP (ref 50–242)
LYMPHOCYTES # BLD AUTO: 1.87 K/UL — SIGNIFICANT CHANGE UP (ref 1–3.3)
LYMPHOCYTES # BLD AUTO: 20.5 % — SIGNIFICANT CHANGE UP (ref 13–44)
MCHC RBC-ENTMCNC: 27.6 PG — SIGNIFICANT CHANGE UP (ref 27–34)
MCHC RBC-ENTMCNC: 33.1 GM/DL — SIGNIFICANT CHANGE UP (ref 32–36)
MCV RBC AUTO: 83.3 FL — SIGNIFICANT CHANGE UP (ref 80–100)
MONOCYTES # BLD AUTO: 0.76 K/UL — SIGNIFICANT CHANGE UP (ref 0–0.9)
MONOCYTES NFR BLD AUTO: 8.3 % — SIGNIFICANT CHANGE UP (ref 2–14)
NEUTROPHILS # BLD AUTO: 6.27 K/UL — SIGNIFICANT CHANGE UP (ref 1.8–7.4)
NEUTROPHILS NFR BLD AUTO: 68.8 % — SIGNIFICANT CHANGE UP (ref 43–77)
NRBC # BLD: 0 /100 WBCS — SIGNIFICANT CHANGE UP (ref 0–0)
PLATELET # BLD AUTO: 195 K/UL — SIGNIFICANT CHANGE UP (ref 150–400)
POTASSIUM SERPL-MCNC: 3.4 MMOL/L — LOW (ref 3.5–5.3)
POTASSIUM SERPL-SCNC: 3.4 MMOL/L — LOW (ref 3.5–5.3)
PROT SERPL-MCNC: 6.4 G/DL — SIGNIFICANT CHANGE UP (ref 6–8.3)
RBC # BLD: 4.5 M/UL — SIGNIFICANT CHANGE UP (ref 3.8–5.2)
RBC # FLD: 13.7 % — SIGNIFICANT CHANGE UP (ref 10.3–14.5)
RH IG SCN BLD-IMP: POSITIVE — SIGNIFICANT CHANGE UP
SODIUM SERPL-SCNC: 135 MMOL/L — SIGNIFICANT CHANGE UP (ref 135–145)
URATE SERPL-MCNC: 4.1 MG/DL — SIGNIFICANT CHANGE UP (ref 2.5–7)
WBC # BLD: 9.12 K/UL — SIGNIFICANT CHANGE UP (ref 3.8–10.5)
WBC # FLD AUTO: 9.12 K/UL — SIGNIFICANT CHANGE UP (ref 3.8–10.5)

## 2024-10-17 PROCEDURE — 99231 SBSQ HOSP IP/OBS SF/LOW 25: CPT

## 2024-10-17 RX ADMIN — Medication 30 MILLIGRAM(S): at 05:49

## 2024-10-17 RX ADMIN — HEPARIN SODIUM 5000 UNIT(S): 10000 INJECTION INTRAVENOUS; SUBCUTANEOUS at 10:04

## 2024-10-17 RX ADMIN — HEPARIN SODIUM 5000 UNIT(S): 10000 INJECTION INTRAVENOUS; SUBCUTANEOUS at 21:14

## 2024-10-17 NOTE — PROGRESS NOTE ADULT - ASSESSMENT
24y  at 32w2d admitted as a transfer from [facility] due to sPEC in the setting of severe FGR of baby B (di/di tiUP). Pt s/p Labetalol 20 IVP x1 at [facility] and has not required any more short acting antihypertensive medications during her inpatient hospital course. She has been on Nifedipine 30mg XL qd since her transfer here. Patient was without complaints this morning     #Preeclampsia with severe features   - Tylenol and reglan PRN for headache. Patient asymptomatic this morning  -cw BP monitorin-134/77-89 overnight  - HELLP wnl (repeat q3d). Next due 10/17 (today)  - s/p Mg x24h  - s/p Lab 20  - Continue Procardia 30XL daily  - Patient is scheduled for pCS on 10/28 pending any changes in clinical status     #Fetal wellbeing  -ATU (10/15): A: breech, ant plac, MVP 4.69,BPP 8/8:A:Transverse, back up, head maternal left, ant plac,MVP 3.14,BPP 8/8, UAD intermittently elevated  -ATU(10/10): A: breech, anterior placenta, MVP 3.73, BPP 8/8, UAD wnl. B: transverse superior, back down maternal L, anterior placenta, MVP 3.31, BPP 8/8, UAD intermittently elevated  -ATU(10/7): A: breech, anterior placenta, MVP 5.23, BPP 8/8. B: back down head R, anterior placenta, MVP 3.75, BPP 8/8  -ATU (10/4): A: breech, maternal R, anterior placenta, 1494g, MVP 4.19, BPP 8/8. B: transverse head mat R, anterior placenta, MVP 3.39, 888g (<1%, AC <1%, intermittently elevated UAD), BPP 8/8  -ATU(): A: beech inferior maternal R, anterior placenta, MVP 6, BPP 8/8, UAD wnl. Fetus B: breech superior maternal L, anterior placenta, MVP 6.3, BPP 8/8, UAD wnl  - NST BID  - PNV  - s/p BMZ (-)  - s/p NICU consult   - GBS: neg    #Maternal wellbeing  - T&S q3d  - regular diet   - HSQ for DVT prophylaxis    Edilberto Bolden, PGY-3  Obstetrics & Gynecology

## 2024-10-17 NOTE — PROGRESS NOTE ADULT - NSPROGADDITIONALINFOA_GEN_ALL_CORE
24 y.o. P0 at 32w2d w/ di-di twins admitted for preeclampsia with severe features with pregnancy c/b severe FGR of baby B (EFW <1%, AC <1%, intermittent elevated dopplers). No labor or preeclampsia complaints overnight. Blood pressures predominantly 120s/70s-80s on Nifedipine 30mg with no recent changes to blood pressure management. Reactive and reassuring NST x2. Plan for BPP/Dopplers today. Last growth 10/4 - plan for repeat growth q2 weeks (next 10/21). s/p BMZ (-), NICU consult, Mg. MOD: Primary  (scheduled) for fetal malpresentation (persistent breech, transverse). Candidate for rescue steroids. Inpatient monitoring until delivery at 34 weeks or sooner if clinically indicated (worsening uncontrolled blood pressures, end organ dysfunction, non-reassuring fetal status). BCM: considering IUD at 6w visit.     Seen and d/w Dr. Aden Bear, MFM Fellow

## 2024-10-18 ENCOUNTER — APPOINTMENT (OUTPATIENT)
Dept: ANTEPARTUM | Facility: CLINIC | Age: 24
End: 2024-10-18

## 2024-10-18 PROCEDURE — 99231 SBSQ HOSP IP/OBS SF/LOW 25: CPT

## 2024-10-18 RX ADMIN — Medication 30 MILLIGRAM(S): at 05:50

## 2024-10-18 RX ADMIN — HEPARIN SODIUM 5000 UNIT(S): 10000 INJECTION INTRAVENOUS; SUBCUTANEOUS at 08:30

## 2024-10-18 RX ADMIN — Medication 1 TABLET(S): at 11:33

## 2024-10-18 RX ADMIN — HEPARIN SODIUM 5000 UNIT(S): 10000 INJECTION INTRAVENOUS; SUBCUTANEOUS at 20:21

## 2024-10-18 NOTE — PROGRESS NOTE ADULT - SUBJECTIVE AND OBJECTIVE BOX
R3 Antepartum Note, HD#20     Patient seen and examined at bedside, no acute overnight events. No acute complaints.   Pt reports +FM, denies LOF, VB, or ctx  Denies HA, epigastric pain, blurred vision, CP, SOB, or n/v    Vital Signs Last 24 Hours  T(C): 36.6 (10-18-24 @ 05:50), Max: 37.1 (10-17-24 @ 13:00)  HR: 97 (10-18-24 @ 05:50) (97 - 122)  BP: 126/74 (10-18-24 @ 05:50) (118/79 - 147/84)  RR: 18 (10-18-24 @ 05:50) (18 - 18)  SpO2: 99% (10-18-24 @ 05:50) (97% - 99%)    CAPILLARY BLOOD GLUCOSE          Physical Exam:  General: No acute distress. Resting comfortably  Pulm: Unlabored breathing. No respiratory distress   Abdomen: Soft. Non-tender. Gravid   Ext: No calf tenderness bilaterally        Labs:             12.4   9.12  )-----------( 195      ( 10-17 @ 06:49 )             37.5     10-17 @ 06:49    135  |  103  |  7   ----------------------------<  79  3.4   |  18  |  0.37    Ca    9.4      10-17 @ 06:49    TPro  6.4  /  Alb  3.4  /  TBili  0.2  /  DBili  x   /  AST  12  /  ALT  10  /  AlkPhos  195  10-17 @ 06:49        Uric Acid: (10-17 @ 06:49)  4.1      Fibrinogen: (10-17 @ 06:49)  --       LDH: (10-17 @ 06:49)  153        MEDICATIONS  (STANDING):  heparin   Injectable 5000 Unit(s) SubCutaneous every 12 hours  influenza   Vaccine 0.5 milliLiter(s) IntraMuscular once  lactated ringers. 1000 milliLiter(s) (50 mL/Hr) IV Continuous <Continuous>  NIFEdipine XL 30 milliGRAM(s) Oral every 24 hours  prenatal multivitamin 1 Tablet(s) Oral daily    MEDICATIONS  (PRN):  acetaminophen     Tablet .. 975 milliGRAM(s) Oral every 6 hours PRN Mild Pain (1 - 3), Moderate Pain (4 - 6)  metoclopramide 10 milliGRAM(s) Oral every 8 hours PRN headache  ondansetron Injectable 4 milliGRAM(s) IV Push every 8 hours PRN Nausea and/or Vomiting

## 2024-10-18 NOTE — PROGRESS NOTE ADULT - NSPROGADDITIONALINFOA_GEN_ALL_CORE
24 y.o. P0 at 32w3d w/ di-di twins admitted for preeclampsia with severe features with pregnancy c/b severe FGR of baby B (EFW <1%, AC <1%, intermittent elevated dopplers). No labor or preeclampsia complaints overnight. Mild range blood pressure overnight but no other concerning features - no indication to increase Nifedipine dosage from 30mg. Plan for NST this AM. BPP/Dopplers reassuring yesterday. Last growth 10/4 - plan for repeat growth q2 weeks (next 10/21). s/p BMZ (-), NICU consult, Mg. MOD: Primary  (scheduled) for fetal malpresentation (persistent breech, transverse). Candidate for rescue steroids. Inpatient monitoring until delivery at 34 weeks or sooner if clinically indicated (worsening uncontrolled blood pressures, end organ dysfunction, non-reassuring fetal status). BCM: considering IUD at 6w visit.     Seen and d/w Dr. Aden Bear, MFM Fellow

## 2024-10-18 NOTE — PROGRESS NOTE ADULT - ASSESSMENT
24y  at 32w3d admitted as a transfer from [facility] due to sPEC in the setting of severe FGR of baby B (di/di tiUP). Pt s/p Labetalol 20 IVP x1 at [facility] and has not required any more short acting antihypertensive medications during her inpatient hospital course. She has been on Nifedipine 30mg XL qd since her transfer here. Patient was without complaints or concerns this morning. There was one elevated BP of 147/84 overnight but remainder of BPs have been normotensive      #Preeclampsia with severe features   - Tylenol and reglan PRN for headache. Patient asymptomatic this morning  - BP monitorin-147/74-84 overnight (only one sole elevation)  - HELLP wnl (repeat q3d). Next due 10/20   - s/p Mg x24h  - s/p Lab 20  - Continue Procardia 30XL daily  - Patient is scheduled for pCS on 10/28 pending any changes in clinical status     #Fetal wellbeing  -ATU (10/17): A: breech. ant plac. MVP 5.98, BPP 8/8. B: Trv back down, head maternal right. Ant placenta. MVP 5.14. BPP 8/8 and intermittently elevated UAD (no AEDV)  -ATU (10/15): A: breech, ant plac, MVP 4.69,BPP 8/8:A:Transverse, back up, head maternal left, ant plac,MVP 3.14,BPP 8/8, UAD intermittently elevated  -ATU(10/10): A: breech, anterior placenta, MVP 3.73, BPP 8/8, UAD wnl. B: transverse superior, back down maternal L, anterior placenta, MVP 3.31, BPP 8/8, UAD intermittently elevated  -ATU(10/7): A: breech, anterior placenta, MVP 5.23, BPP 8/8. B: back down head R, anterior placenta, MVP 3.75, BPP 8/8  -ATU (10/4): A: breech, maternal R, anterior placenta, 1494g, MVP 4.19, BPP 8/8. B: transverse head mat R, anterior placenta, MVP 3.39, 888g (<1%, AC <1%, intermittently elevated UAD), BPP 8/8  -ATU(): A: beech inferior maternal R, anterior placenta, MVP 6, BPP 8/8, UAD wnl. Fetus B: breech superior maternal L, anterior placenta, MVP 6.3, BPP 8/8, UAD wnl  - NST BID  - PNV  - s/p BMZ (-)  - s/p NICU consult   - GBS: neg    #Maternal wellbeing  - T&S q3d  - regular diet   - HSQ for DVT prophylaxis    Edilberto Bolden, PGY-3  Obstetrics & Gynecology

## 2024-10-19 PROCEDURE — 99231 SBSQ HOSP IP/OBS SF/LOW 25: CPT

## 2024-10-19 RX ORDER — CLOSTRIDIUM TETANI TOXOID ANTIGEN (FORMALDEHYDE INACTIVATED), CORYNEBACTERIUM DIPHTHERIAE TOXOID ANTIGEN (FORMALDEHYDE INACTIVATED), BORDETELLA PERTUSSIS TOXOID ANTIGEN (GLUTARALDEHYDE INACTIVATED), BORDETELLA PERTUSSIS FILAMENTOUS HEMAGGLUTININ ANTIGEN (FORMALDEHYDE INACTIVATED), BORDETELLA PERTUSSIS PERTACTIN ANTIGEN, AND BORDETELLA PERTUSSIS FIMBRIAE 2/3 ANTIGEN 5; 2; 2.5; 5; 3; 5 [LF]/.5ML; [LF]/.5ML; UG/.5ML; UG/.5ML; UG/.5ML; UG/.5ML
0.5 INJECTION, SUSPENSION INTRAMUSCULAR ONCE
Refills: 0 | Status: COMPLETED | OUTPATIENT
Start: 2024-10-19 | End: 2024-10-19

## 2024-10-19 RX ORDER — CLOSTRIDIUM TETANI TOXOID ANTIGEN (FORMALDEHYDE INACTIVATED), CORYNEBACTERIUM DIPHTHERIAE TOXOID ANTIGEN (FORMALDEHYDE INACTIVATED), BORDETELLA PERTUSSIS TOXOID ANTIGEN (GLUTARALDEHYDE INACTIVATED), BORDETELLA PERTUSSIS FILAMENTOUS HEMAGGLUTININ ANTIGEN (FORMALDEHYDE INACTIVATED), BORDETELLA PERTUSSIS PERTACTIN ANTIGEN, AND BORDETELLA PERTUSSIS FIMBRIAE 2/3 ANTIGEN 5; 2; 2.5; 5; 3; 5 [LF]/.5ML; [LF]/.5ML; UG/.5ML; UG/.5ML; UG/.5ML; UG/.5ML
0.5 INJECTION, SUSPENSION INTRAMUSCULAR ONCE
Refills: 0 | Status: DISCONTINUED | OUTPATIENT
Start: 2024-10-19 | End: 2024-10-19

## 2024-10-19 RX ADMIN — Medication 1 TABLET(S): at 11:39

## 2024-10-19 RX ADMIN — Medication 30 MILLIGRAM(S): at 05:11

## 2024-10-19 RX ADMIN — HEPARIN SODIUM 5000 UNIT(S): 10000 INJECTION INTRAVENOUS; SUBCUTANEOUS at 20:27

## 2024-10-19 RX ADMIN — CLOSTRIDIUM TETANI TOXOID ANTIGEN (FORMALDEHYDE INACTIVATED), CORYNEBACTERIUM DIPHTHERIAE TOXOID ANTIGEN (FORMALDEHYDE INACTIVATED), BORDETELLA PERTUSSIS TOXOID ANTIGEN (GLUTARALDEHYDE INACTIVATED), BORDETELLA PERTUSSIS FILAMENTOUS HEMAGGLUTININ ANTIGEN (FORMALDEHYDE INACTIVATED), BORDETELLA PERTUSSIS PERTACTIN ANTIGEN, AND BORDETELLA PERTUSSIS FIMBRIAE 2/3 ANTIGEN 0.5 MILLILITER(S): 5; 2; 2.5; 5; 3; 5 INJECTION, SUSPENSION INTRAMUSCULAR at 13:03

## 2024-10-19 RX ADMIN — HEPARIN SODIUM 5000 UNIT(S): 10000 INJECTION INTRAVENOUS; SUBCUTANEOUS at 09:35

## 2024-10-19 NOTE — PROGRESS NOTE ADULT - SUBJECTIVE AND OBJECTIVE BOX
R3 Antepartum Note    Patient seen and examined at bedside, no acute overnight events. No acute complaints. Pt reports +FM, denies LOF, VB, ctx, HA, epigastric pain, blurred vision, CP, SOB, N/V, fevers, and chills.    Vital Signs Last 24 Hours  T(C): 36.6 (10-19-24 @ 05:00), Max: 36.6 (10-18-24 @ 17:22)  HR: 96 (10-19-24 @ 05:00) (96 - 109)  BP: 111/72 (10-19-24 @ 05:00) (111/72 - 137/84)  RR: 18 (10-19-24 @ 05:00) (18 - 18)  SpO2: 96% (10-19-24 @ 05:00) (96% - 100%)    CAPILLARY BLOOD GLUCOSE          Physical Exam:  General: NAD  Abdomen: Soft, non-tender, gravid  Ext: No pain or swelling    NST reactive overnight    Labs:            Uric Acid: (10-17 @ 06:49)  4.1      Fibrinogen: (10-17 @ 06:49)  --       LDH: (10-17 @ 06:49)  153        MEDICATIONS  (STANDING):  heparin   Injectable 5000 Unit(s) SubCutaneous every 12 hours  influenza   Vaccine 0.5 milliLiter(s) IntraMuscular once  lactated ringers. 1000 milliLiter(s) (50 mL/Hr) IV Continuous <Continuous>  NIFEdipine XL 30 milliGRAM(s) Oral every 24 hours  prenatal multivitamin 1 Tablet(s) Oral daily    MEDICATIONS  (PRN):  acetaminophen     Tablet .. 975 milliGRAM(s) Oral every 6 hours PRN Mild Pain (1 - 3), Moderate Pain (4 - 6)  metoclopramide 10 milliGRAM(s) Oral every 8 hours PRN headache  ondansetron Injectable 4 milliGRAM(s) IV Push every 8 hours PRN Nausea and/or Vomiting

## 2024-10-19 NOTE — PROGRESS NOTE ADULT - NSPROGADDITIONALINFOA_GEN_ALL_CORE
24 y.o. P0 at 32w4d w/ di-di twins admitted for preeclampsia with severe features with pregnancy c/b severe FGR of baby B (EFW <1%, AC <1%, intermittent elevated dopplers). No labor or preeclampsia complaints overnight. Blood pressures predominantly normotensive on Nifedipine 30mg. NST this AM reassuring x2. Given plan for delivery at 34w0d plan for rescue ACS in the two days prior to delivery. Primary  scheduled for fetal malpresentation. Continue twice weekly BPP/NSTs. Next growth 10/21 with Dopplers. Inpatient monitoring until delivery at 34 weeks or sooner if clinically indicated (worsening uncontrolled blood pressures, end organ dysfunction, non-reassuring fetal status). BCM: considering IUD at 6w visit.     Seen and d/w Dr. Aden Bear, MFM Fellow

## 2024-10-19 NOTE — PROGRESS NOTE ADULT - ASSESSMENT
24y  at 32w5d admitted as a transfer from [facility] due to sPEC in the setting of severe FGR of baby B (di/di tiUP). Pt s/p Labetalol 20 IVP x1 at [facility] and has not required any more short acting antihypertensive medications during her inpatient hospital course. She has been on Nifedipine 30mg XL qd since her transfer here. Patient was without complaints or concerns this morning.    #Preeclampsia with severe features  - Tylenol and reglan PRN for headache. Patient asymptomatic this morning  - BP monitorin-137/83-84 overnight (only one sole elevation)  - HELLP wnl (repeat q3d). Next due 10/20  - s/p Mg x24h  - s/p Lab 20  - Continue Procardia 30XL daily  - Patient is scheduled for pCS on 10/28 pending any changes in clinical status    #Fetal wellbeing  -ATU (10/17): A: breech. ant plac. MVP 5.98, BPP 8/8. B: Trv back down, head maternal right. Ant placenta. MVP 5.14. BPP 8/8 and intermittently elevated UAD (no AEDV)  -ATU (10/15): A: breech, ant plac, MVP 4.69,BPP 8/8:A:Transverse, back up, head maternal left, ant plac,MVP 3.14,BPP 8/8, UAD intermittently elevated  -ATU(10/10): A: breech, anterior placenta, MVP 3.73, BPP 8/8, UAD wnl. B: transverse superior, back down maternal L, anterior placenta, MVP 3.31, BPP 8/8, UAD intermittently elevated  -ATU(10/7): A: breech, anterior placenta, MVP 5.23, BPP 8/8. B: back down head R, anterior placenta, MVP 3.75, BPP 8/8  -ATU (10/4): A: breech, maternal R, anterior placenta, 1494g, MVP 4.19, BPP 8/8. B: transverse head mat R, anterior placenta, MVP 3.39, 888g (<1%, AC <1%, intermittently elevated UAD), BPP 8/8  -ATU(): A: beech inferior maternal R, anterior placenta, MVP 6, BPP 8/8, UAD wnl. Fetus B: breech superior maternal L, anterior placenta, MVP 6.3, BPP 8/8, UAD wnl  - NST BID  - PNV  - s/p BMZ (-)  - s/p NICU consult  - GBS: neg    #Maternal wellbeing  - T&S q3d  - regular diet  - HSQ for DVT     Danae Roca, PGY3

## 2024-10-20 LAB
ALBUMIN SERPL ELPH-MCNC: 3.3 G/DL — SIGNIFICANT CHANGE UP (ref 3.3–5)
ALP SERPL-CCNC: 200 U/L — HIGH (ref 40–120)
ALT FLD-CCNC: 9 U/L — LOW (ref 10–45)
ANION GAP SERPL CALC-SCNC: 14 MMOL/L — SIGNIFICANT CHANGE UP (ref 5–17)
AST SERPL-CCNC: 18 U/L — SIGNIFICANT CHANGE UP (ref 10–40)
BASOPHILS # BLD AUTO: 0.03 K/UL — SIGNIFICANT CHANGE UP (ref 0–0.2)
BASOPHILS NFR BLD AUTO: 0.3 % — SIGNIFICANT CHANGE UP (ref 0–2)
BILIRUB SERPL-MCNC: 0.2 MG/DL — SIGNIFICANT CHANGE UP (ref 0.2–1.2)
BLD GP AB SCN SERPL QL: NEGATIVE — SIGNIFICANT CHANGE UP
BUN SERPL-MCNC: 8 MG/DL — SIGNIFICANT CHANGE UP (ref 7–23)
CALCIUM SERPL-MCNC: 9.2 MG/DL — SIGNIFICANT CHANGE UP (ref 8.4–10.5)
CHLORIDE SERPL-SCNC: 106 MMOL/L — SIGNIFICANT CHANGE UP (ref 96–108)
CO2 SERPL-SCNC: 15 MMOL/L — LOW (ref 22–31)
CREAT SERPL-MCNC: 0.48 MG/DL — LOW (ref 0.5–1.3)
EGFR: 134 ML/MIN/1.73M2 — SIGNIFICANT CHANGE UP
EOSINOPHIL # BLD AUTO: 0.12 K/UL — SIGNIFICANT CHANGE UP (ref 0–0.5)
EOSINOPHIL NFR BLD AUTO: 1.4 % — SIGNIFICANT CHANGE UP (ref 0–6)
GLUCOSE SERPL-MCNC: 104 MG/DL — HIGH (ref 70–99)
HCT VFR BLD CALC: 40.2 % — SIGNIFICANT CHANGE UP (ref 34.5–45)
HGB BLD-MCNC: 12.9 G/DL — SIGNIFICANT CHANGE UP (ref 11.5–15.5)
IMM GRANULOCYTES NFR BLD AUTO: 0.7 % — SIGNIFICANT CHANGE UP (ref 0–0.9)
LDH SERPL L TO P-CCNC: 213 U/L — SIGNIFICANT CHANGE UP (ref 50–242)
LYMPHOCYTES # BLD AUTO: 1.46 K/UL — SIGNIFICANT CHANGE UP (ref 1–3.3)
LYMPHOCYTES # BLD AUTO: 16.9 % — SIGNIFICANT CHANGE UP (ref 13–44)
MCHC RBC-ENTMCNC: 28 PG — SIGNIFICANT CHANGE UP (ref 27–34)
MCHC RBC-ENTMCNC: 32.1 GM/DL — SIGNIFICANT CHANGE UP (ref 32–36)
MCV RBC AUTO: 87.4 FL — SIGNIFICANT CHANGE UP (ref 80–100)
MONOCYTES # BLD AUTO: 0.58 K/UL — SIGNIFICANT CHANGE UP (ref 0–0.9)
MONOCYTES NFR BLD AUTO: 6.7 % — SIGNIFICANT CHANGE UP (ref 2–14)
NEUTROPHILS # BLD AUTO: 6.38 K/UL — SIGNIFICANT CHANGE UP (ref 1.8–7.4)
NEUTROPHILS NFR BLD AUTO: 74 % — SIGNIFICANT CHANGE UP (ref 43–77)
NRBC # BLD: 0 /100 WBCS — SIGNIFICANT CHANGE UP (ref 0–0)
PLATELET # BLD AUTO: 122 K/UL — LOW (ref 150–400)
POTASSIUM SERPL-MCNC: 3.7 MMOL/L — SIGNIFICANT CHANGE UP (ref 3.5–5.3)
POTASSIUM SERPL-SCNC: 3.7 MMOL/L — SIGNIFICANT CHANGE UP (ref 3.5–5.3)
PROT SERPL-MCNC: 6.5 G/DL — SIGNIFICANT CHANGE UP (ref 6–8.3)
RBC # BLD: 4.6 M/UL — SIGNIFICANT CHANGE UP (ref 3.8–5.2)
RBC # FLD: 14.2 % — SIGNIFICANT CHANGE UP (ref 10.3–14.5)
RH IG SCN BLD-IMP: POSITIVE — SIGNIFICANT CHANGE UP
SODIUM SERPL-SCNC: 135 MMOL/L — SIGNIFICANT CHANGE UP (ref 135–145)
URATE SERPL-MCNC: 4.2 MG/DL — SIGNIFICANT CHANGE UP (ref 2.5–7)
WBC # BLD: 8.63 K/UL — SIGNIFICANT CHANGE UP (ref 3.8–10.5)
WBC # FLD AUTO: 8.63 K/UL — SIGNIFICANT CHANGE UP (ref 3.8–10.5)

## 2024-10-20 PROCEDURE — 99231 SBSQ HOSP IP/OBS SF/LOW 25: CPT

## 2024-10-20 RX ADMIN — Medication 30 MILLIGRAM(S): at 06:05

## 2024-10-20 RX ADMIN — Medication 1 TABLET(S): at 11:50

## 2024-10-20 RX ADMIN — HEPARIN SODIUM 5000 UNIT(S): 10000 INJECTION INTRAVENOUS; SUBCUTANEOUS at 10:07

## 2024-10-20 RX ADMIN — HEPARIN SODIUM 5000 UNIT(S): 10000 INJECTION INTRAVENOUS; SUBCUTANEOUS at 20:34

## 2024-10-20 NOTE — PROGRESS NOTE ADULT - SUBJECTIVE AND OBJECTIVE BOX
PGY 3 Antepartum Note    No acute overnight events. Patient seen and examined at bedside in NAD. Denies any CTX, LOF or VB.  Reports good fetal movement.    T(F): 97.9 (00:16)  HR: 111 (00:16)  BP: 133/77 (00:16)  RR: 18 (00:16)    Gen: NAD  Cardio: rrr, s1/s2  Pulm: ca b/l  Abd: gravid, nontender, no palpable contractions  Ext: no edema,  no calf enderness  SVE: deferred  NST reactive overnight    Medications:    betamethasone Injectable: 12 milliGRAM(s) (09-30-24 @ 01:11)  diphtheria/tetanus/pertussis (acellular) Vaccine (Adacel): 0.5 milliLiter(s) (10-19-24 @ 13:03)  heparin   Injectable: 5000 Unit(s) (10-19-24 @ 20:27),  5000 Unit(s) (10-19-24 @ 09:35),  5000 Unit(s) (10-18-24 @ 20:21),  5000 Unit(s) (10-18-24 @ 08:30),  5000 Unit(s) (10-17-24 @ 21:14),  5000 Unit(s) (10-17-24 @ 10:04),  5000 Unit(s) (10-16-24 @ 21:42),  5000 Unit(s) (10-16-24 @ 09:37),  5000 Unit(s) (10-15-24 @ 20:51),  5000 Unit(s) (10-15-24 @ 09:44),  5000 Unit(s) (10-14-24 @ 20:15),  5000 Unit(s) (10-14-24 @ 09:27),  5000 Unit(s) (10-13-24 @ 23:51),  5000 Unit(s) (10-13-24 @ 09:32),  5000 Unit(s) (10-12-24 @ 20:53),  5000 Unit(s) (10-12-24 @ 09:12),  5000 Unit(s) (10-11-24 @ 21:00),  5000 Unit(s) (10-11-24 @ 09:17),  5000 Unit(s) (10-10-24 @ 21:24),  5000 Unit(s) (10-10-24 @ 10:00),  5000 Unit(s) (10-09-24 @ 21:30),  5000 Unit(s) (10-09-24 @ 09:49),  5000 Unit(s) (10-08-24 @ 20:23),  5000 Unit(s) (10-08-24 @ 09:15),  5000 Unit(s) (10-07-24 @ 20:28),  5000 Unit(s) (10-07-24 @ 10:13),  5000 Unit(s) (10-06-24 @ 21:07),  5000 Unit(s) (10-06-24 @ 09:10),  5000 Unit(s) (10-05-24 @ 21:07),  5000 Unit(s) (10-05-24 @ 09:01),  5000 Unit(s) (10-04-24 @ 20:57),  5000 Unit(s) (10-04-24 @ 09:49),  5000 Unit(s) (10-03-24 @ 20:59),  5000 Unit(s) (10-03-24 @ 08:56),  5000 Unit(s) (10-02-24 @ 21:23),  5000 Unit(s) (10-02-24 @ 09:16),  5000 Unit(s) (10-01-24 @ 20:57),  5000 Unit(s) (10-01-24 @ 09:13),  5000 Unit(s) (09-30-24 @ 21:33),  5000 Unit(s) (09-30-24 @ 09:12)  lactated ringers Bolus: 1000 mL/Hr (10-08-24 @ 23:16)  magnesium sulfate Infusion: 50 mL/Hr (09-29-24 @ 10:09),  50 mL/Hr (09-29-24 @ 10:04)  metoclopramide: 10 milliGRAM(s) (10-04-24 @ 09:26)  NIFEdipine XL: 30 milliGRAM(s) (10-19-24 @ 05:11),  30 milliGRAM(s) (10-18-24 @ 05:50),  30 milliGRAM(s) (10-17-24 @ 05:49),  30 milliGRAM(s) (10-16-24 @ 06:23),  30 milliGRAM(s) (10-15-24 @ 06:07),  30 milliGRAM(s) (10-14-24 @ 05:26),  30 milliGRAM(s) (10-13-24 @ 05:39),  30 milliGRAM(s) (10-12-24 @ 05:13),  30 milliGRAM(s) (10-11-24 @ 06:13),  30 milliGRAM(s) (10-10-24 @ 05:59),  30 milliGRAM(s) (10-09-24 @ 05:57),  30 milliGRAM(s) (10-08-24 @ 05:52),  30 milliGRAM(s) (10-07-24 @ 05:45),  30 milliGRAM(s) (10-06-24 @ 06:20),  30 milliGRAM(s) (10-05-24 @ 06:14),  30 milliGRAM(s) (10-04-24 @ 05:39),  30 milliGRAM(s) (10-03-24 @ 06:05),  30 milliGRAM(s) (10-02-24 @ 06:14),  30 milliGRAM(s) (10-01-24 @ 05:17),  30 milliGRAM(s) (09-30-24 @ 05:14)  prenatal multivitamin: 1 Tablet(s) (10-19-24 @ 11:39),  1 Tablet(s) (10-18-24 @ 11:33),  1 Tablet(s) (10-17-24 @ 11:47),  1 Tablet(s) (10-16-24 @ 13:28),  1 Tablet(s) (10-15-24 @ 13:06),  1 Tablet(s) (10-14-24 @ 11:45),  1 Tablet(s) (10-13-24 @ 12:36),  1 Tablet(s) (10-12-24 @ 11:25),  1 Tablet(s) (10-11-24 @ 11:43),  1 Tablet(s) (10-10-24 @ 12:23)      Labs:               PGY 3 Antepartum Note    No acute overnight events. Patient seen and examined at bedside in NAD. Denies any CTX, LOF or VB.  Reports good fetal movement. Pt reports episode of spots in her vision overnight that self-resolved. Denies headaches, epigastric pain, SOB.    T(F): 97.9 (00:16)  HR: 111 (00:16)  BP: 133/77 (00:16)  RR: 18 (00:16)    Gen: NAD  Cardio: rrr, s1/s2  Pulm: ca b/l  Abd: gravid, nontender, no palpable contractions  Ext: no edema,  no calf tenderness  SVE: deferred  NST reactive overnight    Medications:    betamethasone Injectable: 12 milliGRAM(s) (09-30-24 @ 01:11)  diphtheria/tetanus/pertussis (acellular) Vaccine (Adacel): 0.5 milliLiter(s) (10-19-24 @ 13:03)  heparin   Injectable: 5000 Unit(s) (10-19-24 @ 20:27),  5000 Unit(s) (10-19-24 @ 09:35),  5000 Unit(s) (10-18-24 @ 20:21),  5000 Unit(s) (10-18-24 @ 08:30),  5000 Unit(s) (10-17-24 @ 21:14),  5000 Unit(s) (10-17-24 @ 10:04),  5000 Unit(s) (10-16-24 @ 21:42),  5000 Unit(s) (10-16-24 @ 09:37),  5000 Unit(s) (10-15-24 @ 20:51),  5000 Unit(s) (10-15-24 @ 09:44),  5000 Unit(s) (10-14-24 @ 20:15),  5000 Unit(s) (10-14-24 @ 09:27),  5000 Unit(s) (10-13-24 @ 23:51),  5000 Unit(s) (10-13-24 @ 09:32),  5000 Unit(s) (10-12-24 @ 20:53),  5000 Unit(s) (10-12-24 @ 09:12),  5000 Unit(s) (10-11-24 @ 21:00),  5000 Unit(s) (10-11-24 @ 09:17),  5000 Unit(s) (10-10-24 @ 21:24),  5000 Unit(s) (10-10-24 @ 10:00),  5000 Unit(s) (10-09-24 @ 21:30),  5000 Unit(s) (10-09-24 @ 09:49),  5000 Unit(s) (10-08-24 @ 20:23),  5000 Unit(s) (10-08-24 @ 09:15),  5000 Unit(s) (10-07-24 @ 20:28),  5000 Unit(s) (10-07-24 @ 10:13),  5000 Unit(s) (10-06-24 @ 21:07),  5000 Unit(s) (10-06-24 @ 09:10),  5000 Unit(s) (10-05-24 @ 21:07),  5000 Unit(s) (10-05-24 @ 09:01),  5000 Unit(s) (10-04-24 @ 20:57),  5000 Unit(s) (10-04-24 @ 09:49),  5000 Unit(s) (10-03-24 @ 20:59),  5000 Unit(s) (10-03-24 @ 08:56),  5000 Unit(s) (10-02-24 @ 21:23),  5000 Unit(s) (10-02-24 @ 09:16),  5000 Unit(s) (10-01-24 @ 20:57),  5000 Unit(s) (10-01-24 @ 09:13),  5000 Unit(s) (09-30-24 @ 21:33),  5000 Unit(s) (09-30-24 @ 09:12)  lactated ringers Bolus: 1000 mL/Hr (10-08-24 @ 23:16)  magnesium sulfate Infusion: 50 mL/Hr (09-29-24 @ 10:09),  50 mL/Hr (09-29-24 @ 10:04)  metoclopramide: 10 milliGRAM(s) (10-04-24 @ 09:26)  NIFEdipine XL: 30 milliGRAM(s) (10-19-24 @ 05:11),  30 milliGRAM(s) (10-18-24 @ 05:50),  30 milliGRAM(s) (10-17-24 @ 05:49),  30 milliGRAM(s) (10-16-24 @ 06:23),  30 milliGRAM(s) (10-15-24 @ 06:07),  30 milliGRAM(s) (10-14-24 @ 05:26),  30 milliGRAM(s) (10-13-24 @ 05:39),  30 milliGRAM(s) (10-12-24 @ 05:13),  30 milliGRAM(s) (10-11-24 @ 06:13),  30 milliGRAM(s) (10-10-24 @ 05:59),  30 milliGRAM(s) (10-09-24 @ 05:57),  30 milliGRAM(s) (10-08-24 @ 05:52),  30 milliGRAM(s) (10-07-24 @ 05:45),  30 milliGRAM(s) (10-06-24 @ 06:20),  30 milliGRAM(s) (10-05-24 @ 06:14),  30 milliGRAM(s) (10-04-24 @ 05:39),  30 milliGRAM(s) (10-03-24 @ 06:05),  30 milliGRAM(s) (10-02-24 @ 06:14),  30 milliGRAM(s) (10-01-24 @ 05:17),  30 milliGRAM(s) (09-30-24 @ 05:14)  prenatal multivitamin: 1 Tablet(s) (10-19-24 @ 11:39),  1 Tablet(s) (10-18-24 @ 11:33),  1 Tablet(s) (10-17-24 @ 11:47),  1 Tablet(s) (10-16-24 @ 13:28),  1 Tablet(s) (10-15-24 @ 13:06),  1 Tablet(s) (10-14-24 @ 11:45),  1 Tablet(s) (10-13-24 @ 12:36),  1 Tablet(s) (10-12-24 @ 11:25),  1 Tablet(s) (10-11-24 @ 11:43),  1 Tablet(s) (10-10-24 @ 12:23)      Labs:

## 2024-10-20 NOTE — PROGRESS NOTE ADULT - ASSESSMENT
24y  at 32w6d admitted as a transfer from [facility] due to sPEC in the setting of severe FGR of baby B (di/di tiUP). Pt s/p Labetalol 20 IVP x1 at [facility] and has not required any more short acting antihypertensive medications during her inpatient hospital course. She has been on Nifedipine 30mg XL qd since her transfer here. Patient was without complaints or concerns this morning, BPs normotensive overnight.    #Preeclampsia with severe features  - Tylenol and reglan PRN for headache. Patient asymptomatic this morning  - BP monitorin-130/70-80s overnight  - HELLP wnl (repeat q3d). Next due 10/20  - s/p Mg x24h  - s/p Lab 20 ()  - Continue Procardia 30XL daily  - Patient is scheduled for pCS on 10/28 pending any changes in clinical status    #Fetal wellbeing  -ATU (10/17): A: breech. ant plac. MVP 5.98, BPP 8/8. B: Trv back down, head maternal right. Ant placenta. MVP 5.14. BPP 8/8 and intermittently elevated UAD (no AEDV)  -ATU (10/15): A: breech, ant plac, MVP 4.69,BPP 8/8:A:Transverse, back up, head maternal left, ant plac,MVP 3.14,BPP 8/8, UAD intermittently elevated  -ATU(10/10): A: breech, anterior placenta, MVP 3.73, BPP 8/8, UAD wnl. B: transverse superior, back down maternal L, anterior placenta, MVP 3.31, BPP 8/8, UAD intermittently elevated  -ATU(10/7): A: breech, anterior placenta, MVP 5.23, BPP 8/8. B: back down head R, anterior placenta, MVP 3.75, BPP 8/8  -ATU (10/4): A: breech, maternal R, anterior placenta, 1494g, MVP 4.19, BPP 8/8. B: transverse head mat R, anterior placenta, MVP 3.39, 888g (<1%, AC <1%, intermittently elevated UAD), BPP 8/8  -ATU(): A: beech inferior maternal R, anterior placenta, MVP 6, BPP 8/8, UAD wnl. Fetus B: breech superior maternal L, anterior placenta, MVP 6.3, BPP 8/8, UAD wnl  - NST BID  - PNV  - s/p BMZ (-)  - s/p NICU consult  - GBS: neg    #Maternal wellbeing  - T&S q3d  - regular diet  - HSQ for DVT     VTimmel PGY3 24y  at 32w6d admitted as a transfer from [facility] due to sPEC in the setting of severe FGR of baby B (di/di tiUP). Pt s/p Labetalol 20 IVP x1 at [facility] and has not required any more short acting antihypertensive medications during her inpatient hospital course. She has been on Nifedipine 30mg XL qd since her transfer here. BPs normotensive overnight, pt with episode of scotomata overnight that self-resolved.    #Preeclampsia with severe features  - Tylenol and reglan PRN for headache. Patient asymptomatic this morning  - BP monitorin-130/70-80s overnight  - HELLP wnl (repeat q3d). Next due 10/20  - s/p Mg x24h  - s/p Lab 20 ()  - Continue Procardia 30XL daily  - Patient is scheduled for pCS on 10/28 pending any changes in clinical status    #Fetal wellbeing  -ATU (10/17): A: breech. ant plac. MVP 5.98, BPP 8/8. B: Trv back down, head maternal right. Ant placenta. MVP 5.14. BPP 8/8 and intermittently elevated UAD (no AEDV)  -ATU (10/15): A: breech, ant plac, MVP 4.69,BPP 8/8:A:Transverse, back up, head maternal left, ant plac,MVP 3.14,BPP 8/8, UAD intermittently elevated  -ATU(10/10): A: breech, anterior placenta, MVP 3.73, BPP 8/8, UAD wnl. B: transverse superior, back down maternal L, anterior placenta, MVP 3.31, BPP 8/8, UAD intermittently elevated  -ATU(10/7): A: breech, anterior placenta, MVP 5.23, BPP 8/8. B: back down head R, anterior placenta, MVP 3.75, BPP 8/8  -ATU (10/4): A: breech, maternal R, anterior placenta, 1494g, MVP 4.19, BPP 8/8. B: transverse head mat R, anterior placenta, MVP 3.39, 888g (<1%, AC <1%, intermittently elevated UAD), BPP 8/8  -ATU(): A: beech inferior maternal R, anterior placenta, MVP 6, BPP 8/8, UAD wnl. Fetus B: breech superior maternal L, anterior placenta, MVP 6.3, BPP 8/8, UAD wnl  - NST BID  - PNV  - s/p BMZ (-)  - s/p NICU consult  - GBS: neg    #Maternal wellbeing  - T&S q3d  - regular diet  - HSQ for DVT     VTimmel PGY3

## 2024-10-21 ENCOUNTER — APPOINTMENT (OUTPATIENT)
Dept: ANTEPARTUM | Facility: CLINIC | Age: 24
End: 2024-10-21

## 2024-10-21 ENCOUNTER — ASOB RESULT (OUTPATIENT)
Age: 24
End: 2024-10-21

## 2024-10-21 LAB
ALBUMIN SERPL ELPH-MCNC: 3.4 G/DL — SIGNIFICANT CHANGE UP (ref 3.3–5)
ALP SERPL-CCNC: 190 U/L — HIGH (ref 40–120)
ALT FLD-CCNC: 8 U/L — LOW (ref 10–45)
ANION GAP SERPL CALC-SCNC: 14 MMOL/L — SIGNIFICANT CHANGE UP (ref 5–17)
AST SERPL-CCNC: 14 U/L — SIGNIFICANT CHANGE UP (ref 10–40)
BASOPHILS # BLD AUTO: 0.02 K/UL — SIGNIFICANT CHANGE UP (ref 0–0.2)
BASOPHILS NFR BLD AUTO: 0.2 % — SIGNIFICANT CHANGE UP (ref 0–2)
BILIRUB SERPL-MCNC: 0.2 MG/DL — SIGNIFICANT CHANGE UP (ref 0.2–1.2)
BUN SERPL-MCNC: 8 MG/DL — SIGNIFICANT CHANGE UP (ref 7–23)
CALCIUM SERPL-MCNC: 9.4 MG/DL — SIGNIFICANT CHANGE UP (ref 8.4–10.5)
CHLORIDE SERPL-SCNC: 103 MMOL/L — SIGNIFICANT CHANGE UP (ref 96–108)
CO2 SERPL-SCNC: 18 MMOL/L — LOW (ref 22–31)
CREAT SERPL-MCNC: 0.39 MG/DL — LOW (ref 0.5–1.3)
EGFR: 141 ML/MIN/1.73M2 — SIGNIFICANT CHANGE UP
EOSINOPHIL # BLD AUTO: 0.1 K/UL — SIGNIFICANT CHANGE UP (ref 0–0.5)
EOSINOPHIL NFR BLD AUTO: 1.1 % — SIGNIFICANT CHANGE UP (ref 0–6)
GLUCOSE SERPL-MCNC: 74 MG/DL — SIGNIFICANT CHANGE UP (ref 70–99)
HCT VFR BLD CALC: 37 % — SIGNIFICANT CHANGE UP (ref 34.5–45)
HGB BLD-MCNC: 12 G/DL — SIGNIFICANT CHANGE UP (ref 11.5–15.5)
IMM GRANULOCYTES NFR BLD AUTO: 0.9 % — SIGNIFICANT CHANGE UP (ref 0–0.9)
LDH SERPL L TO P-CCNC: 142 U/L — SIGNIFICANT CHANGE UP (ref 50–242)
LYMPHOCYTES # BLD AUTO: 1.62 K/UL — SIGNIFICANT CHANGE UP (ref 1–3.3)
LYMPHOCYTES # BLD AUTO: 18.2 % — SIGNIFICANT CHANGE UP (ref 13–44)
MCHC RBC-ENTMCNC: 26.8 PG — LOW (ref 27–34)
MCHC RBC-ENTMCNC: 32.4 GM/DL — SIGNIFICANT CHANGE UP (ref 32–36)
MCV RBC AUTO: 82.8 FL — SIGNIFICANT CHANGE UP (ref 80–100)
MONOCYTES # BLD AUTO: 0.69 K/UL — SIGNIFICANT CHANGE UP (ref 0–0.9)
MONOCYTES NFR BLD AUTO: 7.7 % — SIGNIFICANT CHANGE UP (ref 2–14)
NEUTROPHILS # BLD AUTO: 6.41 K/UL — SIGNIFICANT CHANGE UP (ref 1.8–7.4)
NEUTROPHILS NFR BLD AUTO: 71.9 % — SIGNIFICANT CHANGE UP (ref 43–77)
NRBC # BLD: 0 /100 WBCS — SIGNIFICANT CHANGE UP (ref 0–0)
PLATELET # BLD AUTO: 198 K/UL — SIGNIFICANT CHANGE UP (ref 150–400)
POTASSIUM SERPL-MCNC: 3.5 MMOL/L — SIGNIFICANT CHANGE UP (ref 3.5–5.3)
POTASSIUM SERPL-SCNC: 3.5 MMOL/L — SIGNIFICANT CHANGE UP (ref 3.5–5.3)
PROT SERPL-MCNC: 6.4 G/DL — SIGNIFICANT CHANGE UP (ref 6–8.3)
RBC # BLD: 4.47 M/UL — SIGNIFICANT CHANGE UP (ref 3.8–5.2)
RBC # FLD: 14.4 % — SIGNIFICANT CHANGE UP (ref 10.3–14.5)
SODIUM SERPL-SCNC: 135 MMOL/L — SIGNIFICANT CHANGE UP (ref 135–145)
URATE SERPL-MCNC: 4.2 MG/DL — SIGNIFICANT CHANGE UP (ref 2.5–7)
WBC # BLD: 8.92 K/UL — SIGNIFICANT CHANGE UP (ref 3.8–10.5)
WBC # FLD AUTO: 8.92 K/UL — SIGNIFICANT CHANGE UP (ref 3.8–10.5)

## 2024-10-21 PROCEDURE — 99231 SBSQ HOSP IP/OBS SF/LOW 25: CPT

## 2024-10-21 RX ADMIN — Medication 30 MILLIGRAM(S): at 05:48

## 2024-10-21 RX ADMIN — HEPARIN SODIUM 5000 UNIT(S): 10000 INJECTION INTRAVENOUS; SUBCUTANEOUS at 10:20

## 2024-10-21 RX ADMIN — HEPARIN SODIUM 5000 UNIT(S): 10000 INJECTION INTRAVENOUS; SUBCUTANEOUS at 20:30

## 2024-10-21 RX ADMIN — Medication 1 TABLET(S): at 12:24

## 2024-10-21 NOTE — PROGRESS NOTE ADULT - SUBJECTIVE AND OBJECTIVE BOX
R3 Antepartum Note, HD#23     Patient seen and examined at bedside, no acute overnight events. No acute complaints.   Pt reports +FM, denies LOF, VB, or ctx  Denies HA, epigastric pain, blurred vision, CP, SOB, or n/v    Vital Signs Last 24 Hours  T(C): 36.7 (10-21-24 @ 05:51), Max: 37.1 (10-20-24 @ 17:25)  HR: 105 (10-21-24 @ 05:51) (100 - 109)  BP: 121/74 (10-21-24 @ 05:51) (117/72 - 124/84)  RR: 18 (10-21-24 @ 05:51) (18 - 18)  SpO2: 97% (10-21-24 @ 05:51) (97% - 98%)    CAPILLARY BLOOD GLUCOSE          Physical Exam:  General: No acute distress. Resting comfortably  Pulm: Unlabored breathing. No respiratory distress   Abdomen: Soft. Non-tender. Gravid   Ext: No calf tenderness bilaterally        Labs:             12.9   8.63  )-----------( 122      ( 10-20 @ 06:22 )             40.2     10-20 @ 06:22    135  |  106  |  8   ----------------------------<  104  3.7   |  15  |  0.48    Ca    9.2      10-20 @ 06:22    TPro  6.5  /  Alb  3.3  /  TBili  0.2  /  DBili  x   /  AST  18  /  ALT  9   /  AlkPhos  200  10-20 @ 06:22        Uric Acid: (10-20 @ 06:22)  4.2      Fibrinogen: (10-20 @ 06:22)  --       LDH: (10-20 @ 06:22)  213        MEDICATIONS  (STANDING):  heparin   Injectable 5000 Unit(s) SubCutaneous every 12 hours  lactated ringers. 1000 milliLiter(s) (50 mL/Hr) IV Continuous <Continuous>  NIFEdipine XL 30 milliGRAM(s) Oral every 24 hours  prenatal multivitamin 1 Tablet(s) Oral daily    MEDICATIONS  (PRN):  acetaminophen     Tablet .. 975 milliGRAM(s) Oral every 6 hours PRN Mild Pain (1 - 3), Moderate Pain (4 - 6)  metoclopramide 10 milliGRAM(s) Oral every 8 hours PRN headache  ondansetron Injectable 4 milliGRAM(s) IV Push every 8 hours PRN Nausea and/or Vomiting

## 2024-10-21 NOTE — PROGRESS NOTE ADULT - ASSESSMENT
24y  at 33w0d admitted as a transfer from [facility] due to sPEC in the setting of severe FGR of baby B (di/di tiUP). Pt s/p Labetalol 20 IVP x1 at [facility] and has not required any more short acting antihypertensive medications during her inpatient hospital course. She has been on Nifedipine 30mg XL qd since her transfer here. BPs normotensive overnight and patient without any acute complaints this AM    #Preeclampsia with severe features  - Tylenol and reglan PRN for headache. Patient asymptomatic this morning  - BP monitorin-120s/70s overnight  - HELLP wnl (repeat q3d). Next due 10/23  - s/p Mg x24h  - s/p Lab 20 ()  - Continue Procardia 30XL daily  - Patient is scheduled for pCS on 10/28 pending any changes in clinical status    #Fetal wellbeing  -ATU (10/17): A: breech. ant plac. MVP 5.98, BPP 8/8. B: Trv back down, head maternal right. Ant placenta. MVP 5.14. BPP 8/8 and intermittently elevated UAD (no AEDV)  -ATU (10/15): A: breech, ant plac, MVP 4.69,BPP 8/8:A:Transverse, back up, head maternal left, ant plac,MVP 3.14,BPP 8/8, UAD intermittently elevated  -ATU(10/10): A: breech, anterior placenta, MVP 3.73, BPP 8/8, UAD wnl. B: transverse superior, back down maternal L, anterior placenta, MVP 3.31, BPP 8/8, UAD intermittently elevated  -ATU(10/7): A: breech, anterior placenta, MVP 5.23, BPP 8/8. B: back down head R, anterior placenta, MVP 3.75, BPP 8/8  -ATU (10/4): A: breech, maternal R, anterior placenta, 1494g, MVP 4.19, BPP 8/8. B: transverse head mat R, anterior placenta, MVP 3.39, 888g (<1%, AC <1%, intermittently elevated UAD), BPP 8/8  -ATU(): A: beech inferior maternal R, anterior placenta, MVP 6, BPP 8/8, UAD wnl. Fetus B: breech superior maternal L, anterior placenta, MVP 6.3, BPP 8/8, UAD wnl  - NST BID  - PNV  - s/p BMZ (-). Plan for rescue BMZ x2 days prior to scheduled c/s (starting 10/26)  - s/p NICU consult  - GBS: neg    #Maternal wellbeing  - T&S q3d  - Regular diet  - HSQ for DVT     Edilberto Bolden, PGY-3  Obstetrics & Gynecology    24y  at 33w0d admitted as a transfer from [facility] due to sPEC in the setting of severe FGR of baby B (di/di tiUP). Pt s/p Labetalol 20 IVP x1 at [facility] and has not required any more short acting antihypertensive medications during her inpatient hospital course. She has been on Nifedipine 30mg XL qd since her transfer here. BPs normotensive overnight and patient without any acute complaints this AM    #Preeclampsia with severe features  - Tylenol and reglan PRN for headache. Patient asymptomatic this morning  -cw BP monitorin-120s/70s overnight  - HELLP wnl (repeat q3d). Labs 10/20 w/ plt of 122. Will repeat HELLP labs this AM (10/21)  - s/p Mg x24h  - s/p Lab 20 ()  - Continue Procardia 30XL daily  - Patient is scheduled for pCS on 10/28 pending any changes in clinical status    #Fetal wellbeing  -ATU (10/17): A: breech. ant plac. MVP 5.98, BPP 8/8. B: Trv back down, head maternal right. Ant placenta. MVP 5.14. BPP 8/8 and intermittently elevated UAD (no AEDV)  -ATU (10/15): A: breech, ant plac, MVP 4.69,BPP 8/8:A:Transverse, back up, head maternal left, ant plac,MVP 3.14,BPP 8/8, UAD intermittently elevated  -ATU(10/10): A: breech, anterior placenta, MVP 3.73, BPP 8/8, UAD wnl. B: transverse superior, back down maternal L, anterior placenta, MVP 3.31, BPP 8/8, UAD intermittently elevated  -ATU(10/7): A: breech, anterior placenta, MVP 5.23, BPP 8/8. B: back down head R, anterior placenta, MVP 3.75, BPP 8/8  -ATU (10/4): A: breech, maternal R, anterior placenta, 1494g, MVP 4.19, BPP 8/8. B: transverse head mat R, anterior placenta, MVP 3.39, 888g (<1%, AC <1%, intermittently elevated UAD), BPP 8/8  -ATU(): A: beech inferior maternal R, anterior placenta, MVP 6, BPP 8/8, UAD wnl. Fetus B: breech superior maternal L, anterior placenta, MVP 6.3, BPP 8/8, UAD wnl  - NST BID  - PNV  - s/p BMZ (-). Plan for rescue BMZ x2 days prior to scheduled c/s (starting 10/26)  - s/p NICU consult  - GBS: neg    #Maternal wellbeing  - T&S q3d  - Regular diet  - HSQ for DVT     Edilberto Bolden, PGY-3  Obstetrics & Gynecology    24y  at 33w0d admitted as a transfer from [facility] due to sPEC in the setting of severe FGR of baby B (di/di tiUP). Pt s/p Labetalol 20 IVP x1 at [facility] and has not required any more short acting antihypertensive medications during her inpatient hospital course. She has been on Nifedipine 30mg XL qd since her transfer here. BPs normotensive overnight and patient without any acute complaints this AM    #Preeclampsia with severe features  - Tylenol and reglan PRN for headache. Patient asymptomatic this morning  -cw BP monitorin-120s/70s overnight  - HELLP wnl (repeat q3d). Labs 10/20 w/ plt of 122. Will repeat HELLP labs this AM (10/21)  - s/p Mg x24h  - s/p Lab 20 ()  - Continue Procardia 30XL daily  - Patient is scheduled for pCS on 10/28 pending any changes in clinical status    #Fetal wellbeing  -[facility] (10/17): A: breech. ant plac. MVP 5.98, BPP 8/8. B: Trv back down, head maternal right. Ant placenta. MVP 5.14. BPP 8/8 and intermittently elevated UAD (no AEDV)  -[facility] (10/15): A: breech, ant plac, MVP 4.69,BPP 8/8:A:Transverse, back up, head maternal left, ant plac,MVP 3.14,BPP 8/8, UAD intermittently elevated  -[facility](10/10): A: breech, anterior placenta, MVP 3.73, BPP 8/8, UAD wnl. B: transverse superior, back down maternal L, anterior placenta, MVP 3.31, BPP 8/8, UAD intermittently elevated  -[facility](10/7): A: breech, anterior placenta, MVP 5.23, BPP 8/8. B: back down head R, anterior placenta, MVP 3.75, BPP 8/8  -[facility] (10/4): A: breech, maternal R, anterior placenta, 1494g, MVP 4.19, BPP 8/8. B: transverse head mat R, anterior placenta, MVP 3.39, 888g (<1%, AC <1%, intermittently elevated UAD), BPP 8/8  -[facility]U(): A: beech inferior maternal R, anterior placenta, MVP 6, BPP 8/8, UAD wnl. Fetus B: breech superior maternal L, anterior placenta, MVP 6.3, BPP 8/8, UAD wnl  - NST BID  - PNV  - s/p BMZ (-). Plan for rescue BMZ x2 days prior to scheduled c/s (starting 10/26)  - s/p NICU consult  - GBS: neg  - Having a male and female. Wants an IUD 6wks postpartum but deferring any bridging contraception     #Maternal wellbeing  - T&S q3d  - Regular diet  - HSQ for DVT     Edilberto Bolden, PGY-3  Obstetrics & Gynecology

## 2024-10-22 ENCOUNTER — APPOINTMENT (OUTPATIENT)
Dept: ANTEPARTUM | Facility: CLINIC | Age: 24
End: 2024-10-22

## 2024-10-22 PROCEDURE — 99231 SBSQ HOSP IP/OBS SF/LOW 25: CPT

## 2024-10-22 RX ORDER — HEPARIN SODIUM 10000 [USP'U]/ML
5000 INJECTION INTRAVENOUS; SUBCUTANEOUS EVERY 12 HOURS
Refills: 0 | Status: DISCONTINUED | OUTPATIENT
Start: 2024-10-22 | End: 2024-10-27

## 2024-10-22 RX ORDER — HEPARIN SODIUM 10000 [USP'U]/ML
5000 INJECTION INTRAVENOUS; SUBCUTANEOUS EVERY 8 HOURS
Refills: 0 | Status: DISCONTINUED | OUTPATIENT
Start: 2024-10-22 | End: 2024-10-22

## 2024-10-22 RX ADMIN — HEPARIN SODIUM 5000 UNIT(S): 10000 INJECTION INTRAVENOUS; SUBCUTANEOUS at 20:49

## 2024-10-22 RX ADMIN — Medication 30 MILLIGRAM(S): at 05:50

## 2024-10-22 RX ADMIN — Medication 1 TABLET(S): at 15:24

## 2024-10-22 RX ADMIN — HEPARIN SODIUM 5000 UNIT(S): 10000 INJECTION INTRAVENOUS; SUBCUTANEOUS at 10:27

## 2024-10-22 NOTE — PROGRESS NOTE ADULT - NSPROGADDITIONALINFOA_GEN_ALL_CORE
24 y.o. P0 at 33w1d w/ di-di twins admitted for preeclampsia with severe features with pregnancy c/b severe FGR of baby B (EFW <1%, AC <1%, elevated dopplers). No labor or preeclampsia complaints overnight. Blood pressures predominantly normotensive on Nifedipine 30mg. NST this AM reassuring x2. Last growth 10/21 with Twin A: EFW 1911g (18%), AC 32%, Twin B: EFW 1095g (<1%), AC <1%, elevated Dopplers.     Given plan for delivery at 34w0d plan for rescue ACS in the two days prior to delivery. Primary  scheduled for fetal malpresentation. Continue twice weekly BPP/NSTs and weekly Dopplers. Inpatient monitoring until delivery at 34 weeks or sooner if clinically indicated (worsening uncontrolled blood pressures, end organ dysfunction, non-reassuring fetal status). BCM: considering IUD at 6w visit. 24 y.o. P0 at 33w1d w/ di-di twins admitted for preeclampsia with severe features with pregnancy c/b severe FGR of baby B (EFW <1%, AC <1%, elevated dopplers). No labor or preeclampsia complaints overnight. Blood pressures predominantly normotensive on Nifedipine 30mg. NST this AM reassuring x2. Last growth 10/21 with Twin A: EFW 1911g (18%), AC 32%, Twin B: EFW 1095g (<1%), AC <1%, elevated Dopplers.     Given plan for delivery at 34w0d plan for rescue ACS in the two days prior to delivery. Primary  scheduled for fetal malpresentation. Continue twice weekly BPP/NSTs and weekly Dopplers. Inpatient monitoring until delivery at 34 weeks or sooner if clinically indicated (worsening uncontrolled blood pressures, end organ dysfunction, non-reassuring fetal status). BCM: considering IUD at 6w visit.    Seen and d/w Dr. Wendy Bear, FERMINM Fellow

## 2024-10-22 NOTE — PROGRESS NOTE ADULT - SUBJECTIVE AND OBJECTIVE BOX
R3 Antepartum Note, HD#24     Patient seen and examined at bedside, no acute overnight events. No acute complaints.   Pt reports +FM, denies LOF, VB, or ctx  Denies HA, epigastric pain, blurred vision, CP, SOB, or n/v    Vital Signs Last 24 Hours  T(C): 36.9 (10-22-24 @ 05:46), Max: 37 (10-21-24 @ 17:40)  HR: 99 (10-22-24 @ 05:46) (99 - 111)  BP: 119/71 (10-22-24 @ 05:46) (106/66 - 127/85)  RR: 18 (10-22-24 @ 05:46) (18 - 18)  SpO2: 97% (10-22-24 @ 05:46) (96% - 97%)    CAPILLARY BLOOD GLUCOSE          Physical Exam:  General: No acute distress. Resting comfortably  Pulm: Unlabored breathing. No respiratory distress   Abdomen: Soft. Non-tender. Gravid   Ext: No calf tenderness bilaterally        Labs:             12.0   8.92  )-----------( 198      ( 10-21 @ 08:18 )             37.0     10-21 @ 08:18    135  |  103  |  8   ----------------------------<  74  3.5   |  18  |  0.39    Ca    9.4      10-21 @ 08:18    TPro  6.4  /  Alb  3.4  /  TBili  0.2  /  DBili  x   /  AST  14  /  ALT  8   /  AlkPhos  190  10-21 @ 08:18        Uric Acid: (10-21 @ 08:18)  4.2      Fibrinogen: (10-21 @ 08:18)  --       LDH: (10-21 @ 08:18)  142        MEDICATIONS  (STANDING):  heparin   Injectable 5000 Unit(s) SubCutaneous every 12 hours  lactated ringers. 1000 milliLiter(s) (50 mL/Hr) IV Continuous <Continuous>  NIFEdipine XL 30 milliGRAM(s) Oral every 24 hours  prenatal multivitamin 1 Tablet(s) Oral daily    MEDICATIONS  (PRN):  acetaminophen     Tablet .. 975 milliGRAM(s) Oral every 6 hours PRN Mild Pain (1 - 3), Moderate Pain (4 - 6)  metoclopramide 10 milliGRAM(s) Oral every 8 hours PRN headache  ondansetron Injectable 4 milliGRAM(s) IV Push every 8 hours PRN Nausea and/or Vomiting

## 2024-10-22 NOTE — PROGRESS NOTE ADULT - ASSESSMENT
24y  at 33w1d admitted as a transfer from [facility] due to sPEC in the setting of severe FGR of baby B (di/di tiUP). Pt s/p Labetalol 20 IVP x1 at [facility] and has not required any more short acting antihypertensive medications during her inpatient hospital course. She has been on Nifedipine 30mg XL qd since her transfer here. BPs normotensive overnight and patient without any acute complaints this AM    #Preeclampsia with severe features  - Tylenol and reglan PRN for headache. Patient asymptomatic this morning  -cw BP monitorin-120s/60-80s overnight  - HELLP wnl (repeat q3d). Labs 10/20 w/ plt of 122 w/ repeat of 198. Will return to q3d (10/24)  - s/p Mg x24h  - s/p Lab 20 ()  - Continue Procardia 30XL daily  - Patient is scheduled for pCS on 10/28 pending any changes in clinical status    #Fetal wellbeing  -ATU (10/17): A: breech. ant plac. MVP 5.98, BPP 8/8. B: Trv back down, head maternal right. Ant placenta. MVP 5.14. BPP 8/8 and intermittently elevated UAD (no AEDV)  -ATU (10/15): A: breech, ant plac, MVP 4.69,BPP 8/8:A:Transverse, back up, head maternal left, ant plac,MVP 3.14,BPP 8/8, UAD intermittently elevated  -ATU(10/10): A: breech, anterior placenta, MVP 3.73, BPP 8/8, UAD wnl. B: transverse superior, back down maternal L, anterior placenta, MVP 3.31, BPP 8/8, UAD intermittently elevated  -ATU(10/7): A: breech, anterior placenta, MVP 5.23, BPP 8/8. B: back down head R, anterior placenta, MVP 3.75, BPP 8/8  -ATU (10/4): A: breech, maternal R, anterior placenta, 1494g, MVP 4.19, BPP 8/8. B: transverse head mat R, anterior placenta, MVP 3.39, 888g (<1%, AC <1%, intermittently elevated UAD), BPP 8/8  -ATU(): A: beech inferior maternal R, anterior placenta, MVP 6, BPP 8/8, UAD wnl. Fetus B: breech superior maternal L, anterior placenta, MVP 6.3, BPP 8/8, UAD wnl  - NST BID  - PNV  - s/p BMZ (-). Consider rescue BMZ x2 days prior to scheduled c/s (starting 10/26)  - s/p NICU consult  - GBS: neg  - Having a male and female. Wants an IUD 6wks postpartum but deferring any bridging contraception     #Maternal wellbeing  - T&S q3d  - Regular diet  - HSQ for DVT     Edilberto Bolden, PGY-3  Obstetrics & Gynecology

## 2024-10-22 NOTE — CHART NOTE - NSCHARTNOTEFT_GEN_A_CORE
NUTRITION FOLLOW UP NOTE    PATIENT SEEN FOR: Follow Up    SOURCE: [x] Patient  [x] Current Medical Record  [] RN  [] Family/support person at bedside  [] Patient unavailable/inappropriate  [] Other:    CHART REVIEWED/EVENTS NOTED.  [] No changes to nutrition care plan to note  [x] Nutrition Status:  - Pt is  at 33w1d GA w/ di-di twins (per chart).  - Per chart, "Patient is scheduled for pCS on 10/28 pending any changes in clinical status."     DIET ORDER:   Diet, Regular:   Supplement Feeding Modality:  Oral  Glucerna Shake Cans or Servings Per Day:  2       Frequency:  Daily (10-09-24)    CURRENT DIET ORDER IS:  [x] Appropriate:   [] Inadequate:  [] Other:    NUTRITION INTAKE/PROVISION:  [x] PO: Pt reported good appetite and po intake in-house. Stated she is consuming 3x meals provided daily. Pt content with portion sizes, declined 2x portions at this time. Pt ordered for Glucerna 2x/day (Chocolate), stated she is drinking supplement daily. Pt noted with menu at bedside, ordering down for meals. No food preferences provided by patient at this time.   [] Enteral Nutrition:  [] Parenteral Nutrition:    ANTHROPOMETRICS:  Drug Dosing Weight  Height (cm): 165.1 (29 Sep 2024 11:04)  Weight (kg): 83.9 (29 Sep 2024 11:04)  BMI (kg/m2): 30.8 (29 Sep 2024 11:04)    Weights:  - Pre-Pregnancy Weight (per patient): 170 pounds  - Dosing Weight (per chart): 184.9 pounds ()  - Weight Gain (per chart): ~14 pounds   - Daily Weights (per flow sheets): No daily weights noted to assess at this time.   Limited new weights available to assess. RD to continue to monitor weights and trends as able.     MEDICATIONS:  MEDICATIONS  (STANDING):  heparin   Injectable 5000 Unit(s) SubCutaneous every 12 hours  lactated ringers. 1000 milliLiter(s) (50 mL/Hr) IV Continuous <Continuous>  NIFEdipine XL 30 milliGRAM(s) Oral every 24 hours  prenatal multivitamin 1 Tablet(s) Oral daily    MEDICATIONS  (PRN):  acetaminophen     Tablet .. 975 milliGRAM(s) Oral every 6 hours PRN Mild Pain (1 - 3), Moderate Pain (4 - 6)  metoclopramide 10 milliGRAM(s) Oral every 8 hours PRN headache  ondansetron Injectable 4 milliGRAM(s) IV Push every 8 hours PRN Nausea and/or Vomiting    NUTRITIONALLY PERTINENT LABS:  10- Na135 mmol/L Glu 74 mg/dL K+ 3.5 mmol/L Cr  0.39 mg/dL[L] BUN 8 mg/dL 10- Alb 3.4 g/dL10- ALT 8 U/L[L] AST 14 U/L Alkaline Phosphatase 190 U/L[H]    Finger Sticks:    NUTRITIONALLY PERTINENT MEDICATIONS/LABS:  [x] Reviewed  [x] Relevant notes on medications/labs:  - Ordered for IV Lactated Ringers in-house (per orders).  - Ordered for Prenatal Multivitamin (per orders).     EDEMA:  [x] Reviewed  [x] Relevant notes: No edema noted (per flow sheets).     GI/ I&O:  [x] Reviewed  [x] Relevant notes: Pt reported no diarrhea or constipation. Per reported no nausea or vomiting. Last BM 10/22 (per patient).   [x] Other: Ordered for Reglan and Zofran (per orders).    SKIN:   [x] No pressure injuries documented, per nursing flowsheet  [] Pressure injury previously noted  [] Change in pressure injury documentation:  [] Other:    ESTIMATED NEEDS:  [] No change:  [x] Updated:  Energy: 0605-5765 kcal/day (30-35 kcal/kg)  Protein:  g/day (1.1-1.3 g/kg)  Fluid:   ml/day or [x] defer to team  Based on: Estimated protein-energy needs calculated using pre-pregnancy weight of 170 pounds with consideration for BMI >30, pt in third trimester of pregnancy.     NUTRITION DIAGNOSIS:  [x] Prior Dx:  1) Increased nutrient needs  [] New Dx:    EDUCATION:  [x] Yes: Discussed the importance of consuming adequate calories and proteins throughout the day. Encouraged consumption of HBV protein sources. Reviewed protein/calorie dense food sources/meal and snack ideas. Encouraged adequate hydration and educated on snacking between meals to optimize protein-energy intake. Pt with menu at bedside, aware of ordering procedures. Pt left with no further questions.   [] Not appropriate/warranted    NUTRITION CARE PLAN:  1) Recommend continue current diet as tolerated: Regular. Defer diet texture/consistency to Medical Team.   2) Recommend continue prenatal multivitamin daily unless contraindicated for micronutrient coverage.  3) Monitor and encourage adequate PO intake. Pt with menu at bedside, declined double portions at this time. Recommend continuing Glucerna 2x/day (Chocolate) to optimize protein-energy intake.   4) Monitor electrolytes, replete as needed.   5) Monitor nutritional intake, tolerance to diet prescription, bowel movements, weights, labs, and skin integrity    [] Achieved - Continue current nutrition intervention(s)  [] Current medical condition precludes nutrition intervention at this time.    MONITORING AND EVALUATION:   RD remains available upon request and will follow up per protocol.    Shakira Costa, JAVONN, CDN  TEAMS
Patient with vague abdominal pain around 10p. She was placed on FHT and toco. Patient endorsing more pelvic pressure than contraction pain. Initially around 10p had some ctx q 6min but now ctx have spaced out with LR bolus. Denies VB, ROM and endorses normal fetal movement. Will take off toco and FHT. Will reassess for any future labor concerns overnight.  DTcece PGY3  dw Dr. Roman
NUTRITION FOLLOW UP NOTE    PATIENT SEEN FOR: Follow Up    SOURCE: [x] Patient  [x] Current Medical Record  [] RN  [] Family/support person at bedside  [] Patient unavailable/inappropriate  [] Other:    CHART REVIEWED/EVENTS NOTED.  [x] No changes to nutrition care plan to note  [] Nutrition Status:  - Pt is  at 32w2d GA w/ di-di twins (per chart).  - Per chart, "Section to be scheduled for 34w0d."    DIET ORDER:   Diet, Regular:   Supplement Feeding Modality:  Oral  Glucerna Shake Cans or Servings Per Day:  2       Frequency:  Daily (10-09-24)    CURRENT DIET ORDER IS:  [x] Appropriate: See recommendations below for further details.   [] Inadequate:  [] Other:    NUTRITION INTAKE/PROVISION:  [x] PO: Pt reported good appetite and po intake in-house. Stated she is consuming 3x meals provided daily. Pt content with portion sizes, declined 2x portions at this time. Pt ordered for Glucerna 2x/day, stated she is drinking supplement daily. Prefers chocolate, RD to update flavor per patient preference. Pt noted with menu at bedside, ordering down for meals. No food preferences provided by patient at this time.   [] Enteral Nutrition:  [] Parenteral Nutrition:    ANTHROPOMETRICS:  Drug Dosing Weight  Height (cm): 165.1 (29 Sep 2024 11:04)  Weight (kg): 83.9 (29 Sep 2024 11:04)  BMI (kg/m2): 30.8 (29 Sep 2024 11:04)    Weights:  - Pre-Pregnancy Weight (per patient): 170 pounds  - Dosing Weight (per chart): 184.9 pounds   - Weight Gain (per chart): ~14 pounds   - Daily Weights (per flow sheets): No daily weights noted to assess at this time.   Pt endorsed weight gain prior to admission secondary to pregnancy. RD to continue to monitor weights and trends as able.      MEDICATIONS:  MEDICATIONS  (STANDING):  heparin   Injectable 5000 Unit(s) SubCutaneous every 12 hours  influenza   Vaccine 0.5 milliLiter(s) IntraMuscular once  lactated ringers. 1000 milliLiter(s) (50 mL/Hr) IV Continuous <Continuous>  NIFEdipine XL 30 milliGRAM(s) Oral every 24 hours  prenatal multivitamin 1 Tablet(s) Oral daily    MEDICATIONS  (PRN):  acetaminophen     Tablet .. 975 milliGRAM(s) Oral every 6 hours PRN Mild Pain (1 - 3), Moderate Pain (4 - 6)  metoclopramide 10 milliGRAM(s) Oral every 8 hours PRN headache  ondansetron Injectable 4 milliGRAM(s) IV Push every 8 hours PRN Nausea and/or Vomiting      NUTRITIONALLY PERTINENT LABS:  10-14 Na135 mmol/L Glu 85 mg/dL K+ 3.8 mmol/L Cr  0.42 mg/dL[L] BUN 7 mg/dL 10-14 Alb 3.6 g/dL10-14 ALT 6 U/L[L] AST 11 U/L Alkaline Phosphatase 192 U/L[H]    Finger Sticks:    NUTRITIONALLY PERTINENT MEDICATIONS/LABS:  [x] Reviewed  [x] Relevant notes on medications/labs:  - Ordered for IV Lactated Ringers in-house (per orders).  - Ordered for Prenatal Multivitamin (per orders).     EDEMA:  [x] Reviewed  [x] Relevant notes: No edema noted (per nursing flow sheets).     GI/ I&O:  [x] Reviewed  [x] Relevant notes: Pt reported no nausea or vomiting at this time. Ordered for Reglan and Zofran (per orders). Pt reported no diarrhea or constipation at this time. Last BM: 10/15 (per patient).   [] Other:    SKIN:   [x] No pressure injuries documented, per nursing flowsheet  [] Pressure injury previously noted  [] Change in pressure injury documentation:  [] Other:    ESTIMATED NEEDS:  [x] No change:  [] Updated:  Energy: 7240-0687 kcal/day (30-35 kcal/kg)  Protein:  g/day (1.1-1.4 g/kg)  Fluid:   ml/day or [x] defer to team  Based on: Estimated protein-energy needs calculated using pre-pregnancy weight of 170 pounds with consideration for BMI >30, pt in third trimester of pregnancy.     NUTRITION DIAGNOSIS:  [x] Prior Dx:  1) Increased Nutrient Needs  [] New Dx:    EDUCATION:  [x] Yes: Discussed the importance of consuming adequate calories and proteins throughout the day. Encouraged consumption of HBV protein sources. Reviewed protein/calorie dense food sources/meal and snack ideas. Encouraged adequate hydration and educated on snacking between meals to optimize protein-energy intake. Pt with menu at bedside, aware of ordering procedures. Pt left with no further questions.   [] Not appropriate/warranted    NUTRITION CARE PLAN:  1) Recommend continue current diet as tolerated: Regular. Defer diet texture/consistency to Medical Team.   2) Recommend continue prenatal multivitamin daily unless contraindicated for micronutrient coverage.  3) Monitor and encourage adequate PO intake. Pt with menu at bedside, declined double portions at this time. Recommend continuing Glucerna 2x/day  to optimize protein-energy intake. RD to update flavor to Chocolate.   4) Monitor electrolytes, replete as needed.   5) Monitor nutritional intake, tolerance to diet prescription, bowel movements, weights, labs, and skin integrity.    [] Achieved - Continue current nutrition intervention(s)  [] Current medical condition precludes nutrition intervention at this time.    MONITORING AND EVALUATION:   RD remains available upon request and will follow up per protocol.    Shakira Costa, FALGUNI, CDN  TEAMS

## 2024-10-23 LAB
ALBUMIN SERPL ELPH-MCNC: 3.2 G/DL — LOW (ref 3.3–5)
ALP SERPL-CCNC: 189 U/L — HIGH (ref 40–120)
ALT FLD-CCNC: 8 U/L — LOW (ref 10–45)
ANION GAP SERPL CALC-SCNC: 15 MMOL/L — SIGNIFICANT CHANGE UP (ref 5–17)
AST SERPL-CCNC: 18 U/L — SIGNIFICANT CHANGE UP (ref 10–40)
BASOPHILS # BLD AUTO: 0.04 K/UL — SIGNIFICANT CHANGE UP (ref 0–0.2)
BASOPHILS NFR BLD AUTO: 0.5 % — SIGNIFICANT CHANGE UP (ref 0–2)
BILIRUB SERPL-MCNC: 0.2 MG/DL — SIGNIFICANT CHANGE UP (ref 0.2–1.2)
BLD GP AB SCN SERPL QL: NEGATIVE — SIGNIFICANT CHANGE UP
BUN SERPL-MCNC: 5 MG/DL — LOW (ref 7–23)
CALCIUM SERPL-MCNC: 9.3 MG/DL — SIGNIFICANT CHANGE UP (ref 8.4–10.5)
CHLORIDE SERPL-SCNC: 105 MMOL/L — SIGNIFICANT CHANGE UP (ref 96–108)
CO2 SERPL-SCNC: 16 MMOL/L — LOW (ref 22–31)
CREAT SERPL-MCNC: 0.38 MG/DL — LOW (ref 0.5–1.3)
EGFR: 142 ML/MIN/1.73M2 — SIGNIFICANT CHANGE UP
EOSINOPHIL # BLD AUTO: 0.11 K/UL — SIGNIFICANT CHANGE UP (ref 0–0.5)
EOSINOPHIL NFR BLD AUTO: 1.3 % — SIGNIFICANT CHANGE UP (ref 0–6)
GLUCOSE SERPL-MCNC: 73 MG/DL — SIGNIFICANT CHANGE UP (ref 70–99)
HCT VFR BLD CALC: 37.1 % — SIGNIFICANT CHANGE UP (ref 34.5–45)
HGB BLD-MCNC: 12.3 G/DL — SIGNIFICANT CHANGE UP (ref 11.5–15.5)
IMM GRANULOCYTES NFR BLD AUTO: 0.6 % — SIGNIFICANT CHANGE UP (ref 0–0.9)
LDH SERPL L TO P-CCNC: 222 U/L — SIGNIFICANT CHANGE UP (ref 50–242)
LYMPHOCYTES # BLD AUTO: 1.9 K/UL — SIGNIFICANT CHANGE UP (ref 1–3.3)
LYMPHOCYTES # BLD AUTO: 22.5 % — SIGNIFICANT CHANGE UP (ref 13–44)
MCHC RBC-ENTMCNC: 28 PG — SIGNIFICANT CHANGE UP (ref 27–34)
MCHC RBC-ENTMCNC: 33.2 GM/DL — SIGNIFICANT CHANGE UP (ref 32–36)
MCV RBC AUTO: 84.5 FL — SIGNIFICANT CHANGE UP (ref 80–100)
MONOCYTES # BLD AUTO: 0.66 K/UL — SIGNIFICANT CHANGE UP (ref 0–0.9)
MONOCYTES NFR BLD AUTO: 7.8 % — SIGNIFICANT CHANGE UP (ref 2–14)
NEUTROPHILS # BLD AUTO: 5.68 K/UL — SIGNIFICANT CHANGE UP (ref 1.8–7.4)
NEUTROPHILS NFR BLD AUTO: 67.3 % — SIGNIFICANT CHANGE UP (ref 43–77)
NRBC # BLD: 0 /100 WBCS — SIGNIFICANT CHANGE UP (ref 0–0)
PLATELET # BLD AUTO: 236 K/UL — SIGNIFICANT CHANGE UP (ref 150–400)
POTASSIUM SERPL-MCNC: 3.6 MMOL/L — SIGNIFICANT CHANGE UP (ref 3.5–5.3)
POTASSIUM SERPL-SCNC: 3.6 MMOL/L — SIGNIFICANT CHANGE UP (ref 3.5–5.3)
PROT SERPL-MCNC: 6.1 G/DL — SIGNIFICANT CHANGE UP (ref 6–8.3)
RBC # BLD: 4.39 M/UL — SIGNIFICANT CHANGE UP (ref 3.8–5.2)
RBC # FLD: 14.2 % — SIGNIFICANT CHANGE UP (ref 10.3–14.5)
RH IG SCN BLD-IMP: POSITIVE — SIGNIFICANT CHANGE UP
SODIUM SERPL-SCNC: 136 MMOL/L — SIGNIFICANT CHANGE UP (ref 135–145)
URATE SERPL-MCNC: 4.1 MG/DL — SIGNIFICANT CHANGE UP (ref 2.5–7)
WBC # BLD: 8.44 K/UL — SIGNIFICANT CHANGE UP (ref 3.8–10.5)
WBC # FLD AUTO: 8.44 K/UL — SIGNIFICANT CHANGE UP (ref 3.8–10.5)

## 2024-10-23 PROCEDURE — 99231 SBSQ HOSP IP/OBS SF/LOW 25: CPT

## 2024-10-23 RX ADMIN — HEPARIN SODIUM 5000 UNIT(S): 10000 INJECTION INTRAVENOUS; SUBCUTANEOUS at 10:22

## 2024-10-23 RX ADMIN — Medication 1 TABLET(S): at 12:44

## 2024-10-23 RX ADMIN — HEPARIN SODIUM 5000 UNIT(S): 10000 INJECTION INTRAVENOUS; SUBCUTANEOUS at 20:37

## 2024-10-23 RX ADMIN — Medication 30 MILLIGRAM(S): at 06:04

## 2024-10-23 NOTE — PROGRESS NOTE ADULT - NSPROGADDITIONALINFOA_GEN_ALL_CORE
24 y.o. P0 at 33w2d w/ di-di twins admitted for preeclampsia with severe features with pregnancy c/b severe FGR of baby B (EFW <1%, AC <1%, elevated dopplers). No labor or preeclampsia complaints overnight, just itching on soles that spontaneouslyy resolved. Blood pressures predominantly normotensive on Nifedipine 30mg. NST this AM reassuring x2. Last growth 10/21 with Twin A: EFW 1911g (18%), AC 32%, Twin B: EFW 1095g (<1%), AC <1%, elevated Dopplers.     Given plan for delivery at 34w0d plan for rescue ACS in the two days prior to delivery. Primary  scheduled for fetal malpresentation. Continue twice weekly BPP/NSTs and weekly Dopplers. Inpatient monitoring until delivery at 34 weeks or sooner if clinically indicated (worsening uncontrolled blood pressures, end organ dysfunction, non-reassuring fetal status). BCM: considering IUD at 6w visit.    Seen and d/w Dr. Wendy Bear, FERMINM Fellow

## 2024-10-23 NOTE — PROGRESS NOTE ADULT - ASSESSMENT
24y  at 33w2d admitted as a transfer from [facility] due to sPEC in the setting of severe FGR of baby B (di/di tiUP). Pt s/p Labetalol 20 IVP x1 at [facility] and has not required any more short acting antihypertensive medications during her inpatient hospital course. She has been on Nifedipine 30mg XL qd since her transfer here. BPs normotensive overnight and patient without any preeclamptic sx this AM    #Preeclampsia with severe features  - Tylenol and reglan PRN for headache. Patient asymptomatic this morning  - BP monitorin-120s/70-80s overnight  - HELLP wnl (repeat q3d). Labs pending today (10/23)  - s/p Mg x24h  - s/p Lab 20 ()  - Continue Procardia 30XL daily  - Patient is scheduled for pCS on 10/28 pending any changes in clinical status    #Fetal wellbeing  -ATU (10/21): A. Breech. Ant plac. MVP 3.12. 1911g (18%). BPP 8/8. nl UAD | B Trv. Anterior placenta. MVP 2.77. 1095g (<1%, AC <1%). BPP 8/8. Elevated UAD  -ATU (10/17): A: breech. ant plac. MVP 5.98, BPP 8/8. B: Trv back down, head maternal right. Ant placenta. MVP 5.14. BPP 8/8 and intermittently elevated UAD (no AEDV)  -ATU (10/4): A: breech, maternal R, anterior placenta, 1494g, MVP 4.19, BPP 8/8. B: transverse head mat R, anterior placenta, MVP 3.39, 888g (<1%, AC <1%, intermittently elevated UAD), BPP 8/8  - NST BID  - PNV  - s/p BMZ (-). Consider rescue BMZ x2 days prior to scheduled c/s (starting 10/26)  - s/p NICU consult  - GBS: neg  - Having a male and female. Wants an IUD 6wks postpartum but deferring any bridging contraception     #Maternal wellbeing  - T&S q3d  - Regular diet  - HSQ for DVT     Edilberto Bolden, PGY-3  Obstetrics & Gynecology    24y  at 33w2d admitted as a transfer from [facility] due to sPEC in the setting of severe FGR of baby B (di/di tiUP). Pt s/p Labetalol 20 IVP x1 at [facility] and has not required any more short acting antihypertensive medications during her inpatient hospital course. She has been on Nifedipine 30mg XL qd since her transfer here. BPs normotensive overnight and patient without any preeclamptic sx this AM    #Preeclampsia with severe features  - Tylenol and reglan PRN for headache. Patient asymptomatic this morning  - BP monitorin-120s/70-80s overnight  - HELLP wnl (repeat q3d). Labs pending today (10/23)  - s/p Mg x24h  - s/p Lab 20 ()  - Continue Procardia 30XL daily  - Patient is scheduled for pCS on 10/28 pending any changes in clinical status    #Fetal wellbeing  -ATU (10/21): A. Breech. Ant plac. MVP 3.12. 1911g (18%). BPP 8/8. nl UAD | B Trv. Anterior placenta. MVP 2.77. 1095g (<1%, AC <1%). BPP 8/8. Elevated UAD  -ATU (10/17): A: breech. ant plac. MVP 5.98, BPP 8/8. B: Trv back down, head maternal right. Ant placenta. MVP 5.14. BPP 8/8 and intermittently elevated UAD (no AEDV)  -ATU (10/4): A: breech, maternal R, anterior placenta, 1494g, MVP 4.19, BPP 8/8. B: transverse head mat R, anterior placenta, MVP 3.39, 888g (<1%, AC <1%, intermittently elevated UAD), BPP 8/8  - NST BID  - PNV  - s/p BMZ (-). Consider rescue BMZ x2 days prior to scheduled c/s (starting 10/26)  - s/p NICU consult  - GBS: neg  - Having a male and female. Wants an IUD 6wks postpartum but deferring any bridging contraception     #B/l Feet Pruritis  - Continue to monitor. If persistent, consider adding on bile acids     #Maternal wellbeing  - T&S q3d  - Regular diet  - HSQ for DVT     Edilberto Bolden, PGY-3  Obstetrics & Gynecology

## 2024-10-23 NOTE — PROGRESS NOTE ADULT - SUBJECTIVE AND OBJECTIVE BOX
R3 Antepartum Note, HD#25     Patient seen and examined at bedside, no acute overnight events. No acute complaints.   Pt reports +FM, denies LOF, VB, or ctx  Denies HA, epigastric pain, blurred vision, CP, SOB, or n/v    She noted bilateral feet itchiness and arm itchiness overnight that spontaneously resolved in x2hrs. Denies usage of any new detergents or hygiene products     Vital Signs Last 24 Hours  T(C): 36.9 (10-23-24 @ 06:05), Max: 37.1 (10-22-24 @ 17:31)  HR: 101 (10-23-24 @ 06:05) (90 - 111)  BP: 112/75 (10-23-24 @ 06:05) (112/75 - 132/89)  RR: 18 (10-23-24 @ 06:05) (18 - 18)  SpO2: 97% (10-23-24 @ 06:05) (96% - 98%)    CAPILLARY BLOOD GLUCOSE          Physical Exam:  General: No acute distress. Resting comfortably  Pulm: Unlabored breathing. No respiratory distress   Abdomen: Soft. Non-tender. Gravid   Ext: No calf tenderness bilaterally        Labs:             12.0   8.92  )-----------( 198      ( 10-21 @ 08:18 )             37.0     10-21 @ 08:18    135  |  103  |  8   ----------------------------<  74  3.5   |  18  |  0.39    Ca    9.4      10-21 @ 08:18    TPro  6.4  /  Alb  3.4  /  TBili  0.2  /  DBili  x   /  AST  14  /  ALT  8   /  AlkPhos  190  10-21 @ 08:18        Uric Acid: (10-21 @ 08:18)  4.2      Fibrinogen: (10-21 @ 08:18)  --       LDH: (10-21 @ 08:18)  142        MEDICATIONS  (STANDING):  heparin   Injectable 5000 Unit(s) SubCutaneous every 12 hours  lactated ringers. 1000 milliLiter(s) (50 mL/Hr) IV Continuous <Continuous>  NIFEdipine XL 30 milliGRAM(s) Oral every 24 hours  prenatal multivitamin 1 Tablet(s) Oral daily    MEDICATIONS  (PRN):  acetaminophen     Tablet .. 975 milliGRAM(s) Oral every 6 hours PRN Mild Pain (1 - 3), Moderate Pain (4 - 6)  metoclopramide 10 milliGRAM(s) Oral every 8 hours PRN headache  ondansetron Injectable 4 milliGRAM(s) IV Push every 8 hours PRN Nausea and/or Vomiting   R3 Antepartum Note, HD#25     Patient seen and examined at bedside, no acute overnight events. No acute complaints.   Pt reports +FM, denies LOF, VB, or ctx  Denies HA, epigastric pain, blurred vision, CP, SOB, or n/v    She noted bilateral feet itchiness and arm itchiness overnight that spontaneously resolved in x2hrs. Denies usage of any new detergents or hygiene products. Denies any itchiness of her palms     Vital Signs Last 24 Hours  T(C): 36.9 (10-23-24 @ 06:05), Max: 37.1 (10-22-24 @ 17:31)  HR: 101 (10-23-24 @ 06:05) (90 - 111)  BP: 112/75 (10-23-24 @ 06:05) (112/75 - 132/89)  RR: 18 (10-23-24 @ 06:05) (18 - 18)  SpO2: 97% (10-23-24 @ 06:05) (96% - 98%)    CAPILLARY BLOOD GLUCOSE          Physical Exam:  General: No acute distress. Resting comfortably  Pulm: Unlabored breathing. No respiratory distress   Abdomen: Soft. Non-tender. Gravid   Ext: No calf tenderness bilaterally        Labs:             12.0   8.92  )-----------( 198      ( 10-21 @ 08:18 )             37.0     10-21 @ 08:18    135  |  103  |  8   ----------------------------<  74  3.5   |  18  |  0.39    Ca    9.4      10-21 @ 08:18    TPro  6.4  /  Alb  3.4  /  TBili  0.2  /  DBili  x   /  AST  14  /  ALT  8   /  AlkPhos  190  10-21 @ 08:18        Uric Acid: (10-21 @ 08:18)  4.2      Fibrinogen: (10-21 @ 08:18)  --       LDH: (10-21 @ 08:18)  142        MEDICATIONS  (STANDING):  heparin   Injectable 5000 Unit(s) SubCutaneous every 12 hours  lactated ringers. 1000 milliLiter(s) (50 mL/Hr) IV Continuous <Continuous>  NIFEdipine XL 30 milliGRAM(s) Oral every 24 hours  prenatal multivitamin 1 Tablet(s) Oral daily    MEDICATIONS  (PRN):  acetaminophen     Tablet .. 975 milliGRAM(s) Oral every 6 hours PRN Mild Pain (1 - 3), Moderate Pain (4 - 6)  metoclopramide 10 milliGRAM(s) Oral every 8 hours PRN headache  ondansetron Injectable 4 milliGRAM(s) IV Push every 8 hours PRN Nausea and/or Vomiting

## 2024-10-24 PROCEDURE — 99231 SBSQ HOSP IP/OBS SF/LOW 25: CPT

## 2024-10-24 RX ORDER — BETAMETHASONE SODIUM PHOSPHATE AND BETAMETHASONE ACETATE 3; 3 MG/ML; MG/ML
12 INJECTION, SUSPENSION INTRA-ARTICULAR; INTRALESIONAL; INTRAMUSCULAR EVERY 24 HOURS
Refills: 0 | Status: COMPLETED | OUTPATIENT
Start: 2024-10-24 | End: 2024-10-25

## 2024-10-24 RX ADMIN — Medication 1 TABLET(S): at 12:08

## 2024-10-24 RX ADMIN — HEPARIN SODIUM 5000 UNIT(S): 10000 INJECTION INTRAVENOUS; SUBCUTANEOUS at 09:38

## 2024-10-24 RX ADMIN — Medication 30 MILLIGRAM(S): at 05:59

## 2024-10-24 RX ADMIN — HEPARIN SODIUM 5000 UNIT(S): 10000 INJECTION INTRAVENOUS; SUBCUTANEOUS at 20:08

## 2024-10-24 RX ADMIN — BETAMETHASONE SODIUM PHOSPHATE AND BETAMETHASONE ACETATE 12 MILLIGRAM(S): 3; 3 INJECTION, SUSPENSION INTRA-ARTICULAR; INTRALESIONAL; INTRAMUSCULAR at 18:59

## 2024-10-24 NOTE — PROGRESS NOTE ADULT - ASSESSMENT
24y  at 33w3d admitted as a transfer from [facility] due to sPEC in the setting of severe FGR of baby B (di/di tiUP). Pt s/p Labetalol 20 IVP x1 at [facility] and has not required any more short acting antihypertensive medications during her inpatient hospital course. She has been on Nifedipine 30mg XL qd since her transfer here. BPs normotensive overnight and patient without any preeclamptic sx this AM    #Preeclampsia with severe features  - Tylenol and reglan PRN for headache. Patient asymptomatic this morning  - BP monitorin-120s/60-80s overnight  - HELLP wnl (repeat q3d). Labs pending next 10/26  - s/p Mg x24h  - s/p Lab 20 ()  - Continue Procardia 30XL daily  - Patient is scheduled for pCS on 10/28 pending any changes in clinical status    #Fetal wellbeing  -ATU (10/21): A. Breech. Ant plac. MVP 3.12. 1911g (18%). BPP 8/8. nl UAD | B Trv. Anterior placenta. MVP 2.77. 1095g (<1%, AC <1%). BPP 8/8. Elevated UAD  -ATU (10/17): A: breech. ant plac. MVP 5.98, BPP 8/8. B: Trv back down, head maternal right. Ant placenta. MVP 5.14. BPP 8/8 and intermittently elevated UAD (no AEDV)  -ATU (10/4): A: breech, maternal R, anterior placenta, 1494g, MVP 4.19, BPP 8/8. B: transverse head mat R, anterior placenta, MVP 3.39, 888g (<1%, AC <1%, intermittently elevated UAD), BPP 8/8  - NST BID  - PNV  - s/p BMZ (-). Consider rescue BMZ x2 days prior to scheduled c/s (starting 10/26)  - s/p NICU consult  - GBS: neg  - Having a male and female. Wants an IUD 6wks postpartum but deferring any bridging contraception     #Maternal wellbeing  - T&S q3d  - Regular diet  - HSQ for DVT     Edilberto Bolden, PGY-3  Obstetrics & Gynecology

## 2024-10-24 NOTE — PROGRESS NOTE ADULT - SUBJECTIVE AND OBJECTIVE BOX
R3 Antepartum Note, HD#26     Patient seen and examined at bedside, no acute overnight events. No acute complaints.   Pt reports +FM, denies LOF, VB, or ctx  Denies HA, epigastric pain, blurred vision, CP, SOB, or n/v    Denies any further feet itchiness    Vital Signs Last 24 Hours  T(C): 36.8 (10-24-24 @ 05:58), Max: 37 (10-23-24 @ 17:22)  HR: 102 (10-24-24 @ 05:58) (98 - 108)  BP: 111/76 (10-24-24 @ 05:58) (109/67 - 127/84)  RR: 18 (10-24-24 @ 05:58) (18 - 18)  SpO2: 97% (10-24-24 @ 05:58) (96% - 100%)    CAPILLARY BLOOD GLUCOSE          Physical Exam:  General: No acute distress. Resting comfortably  Pulm: Unlabored breathing. No respiratory distress   Abdomen: Soft. Non-tender. Gravid   Ext: No calf tenderness bilaterally        Labs:             12.3   8.44  )-----------( 236      ( 10-23 @ 07:04 )             37.1     10-23 @ 07:04    136  |  105  |  5   ----------------------------<  73  3.6   |  16  |  0.38    Ca    9.3      10-23 @ 07:04    TPro  6.1  /  Alb  3.2  /  TBili  0.2  /  DBili  x   /  AST  18  /  ALT  8   /  AlkPhos  189  10-23 @ 07:04        Uric Acid: (10-23 @ 07:04)  4.1      Fibrinogen: (10-23 @ 07:04)  --       LDH: (10-23 @ 07:04)  222        MEDICATIONS  (STANDING):  heparin   Injectable 5000 Unit(s) SubCutaneous every 12 hours  lactated ringers. 1000 milliLiter(s) (50 mL/Hr) IV Continuous <Continuous>  NIFEdipine XL 30 milliGRAM(s) Oral every 24 hours  prenatal multivitamin 1 Tablet(s) Oral daily    MEDICATIONS  (PRN):  acetaminophen     Tablet .. 975 milliGRAM(s) Oral every 6 hours PRN Mild Pain (1 - 3), Moderate Pain (4 - 6)  metoclopramide 10 milliGRAM(s) Oral every 8 hours PRN headache  ondansetron Injectable 4 milliGRAM(s) IV Push every 8 hours PRN Nausea and/or Vomiting

## 2024-10-24 NOTE — PROGRESS NOTE ADULT - NSPROGADDITIONALINFOA_GEN_ALL_CORE
24 y.o. P0 at 33w3d w/ di-di twins admitted for preeclampsia with severe features with pregnancy c/b severe FGR of baby B (EFW <1%, AC <1%, elevated dopplers). No preeclampsia symptoms. Blood pressures predominantly normotensive on Nifedipine 30mg. NST this AM reassuring x2. Last growth 10/21 with Twin A: EFW 1911g (18%), AC 32%, Twin B: EFW 1095g (<1%), AC <1%, elevated Dopplers.    Given plan for delivery at 34w0d plan for rescue ACS to be given today. Plan reviewed with patient and she is in agreement. Primary  scheduled for fetal malpresentation on 10/28. Continue twice weekly BPP/NSTs and weekly Dopplers. Inpatient monitoring until delivery at 34 weeks or sooner if clinically indicated (worsening uncontrolled blood pressures, end organ dysfunction, non-reassuring fetal status). BCM: considering IUD at 6w visit.    Seen and d/w Dr. Wendy Bear, FERMINM Fellow

## 2024-10-25 ENCOUNTER — APPOINTMENT (OUTPATIENT)
Dept: ANTEPARTUM | Facility: CLINIC | Age: 24
End: 2024-10-25

## 2024-10-25 ENCOUNTER — ASOB RESULT (OUTPATIENT)
Age: 24
End: 2024-10-25

## 2024-10-25 PROCEDURE — 99231 SBSQ HOSP IP/OBS SF/LOW 25: CPT

## 2024-10-25 RX ADMIN — HEPARIN SODIUM 5000 UNIT(S): 10000 INJECTION INTRAVENOUS; SUBCUTANEOUS at 20:12

## 2024-10-25 RX ADMIN — Medication 30 MILLIGRAM(S): at 05:30

## 2024-10-25 RX ADMIN — HEPARIN SODIUM 5000 UNIT(S): 10000 INJECTION INTRAVENOUS; SUBCUTANEOUS at 08:57

## 2024-10-25 RX ADMIN — BETAMETHASONE SODIUM PHOSPHATE AND BETAMETHASONE ACETATE 12 MILLIGRAM(S): 3; 3 INJECTION, SUSPENSION INTRA-ARTICULAR; INTRALESIONAL; INTRAMUSCULAR at 18:14

## 2024-10-25 RX ADMIN — Medication 1 TABLET(S): at 12:19

## 2024-10-25 NOTE — PROGRESS NOTE ADULT - NSPROGADDITIONALINFOA_GEN_ALL_CORE
24 y.o. P0 at 33w4d w/ di-di twins admitted for preeclampsia with severe features with pregnancy c/b severe FGR of baby B (EFW <1%, AC <1%, elevated dopplers). Small nosebleed this morning but no concern for ongoing bleeding. No preeclampsia symptoms. Blood pressures predominantly normotensive on Nifedipine 30mg. NST this AM reassuring x2. Last growth 10/21 with Twin A: EFW 1911g (18%), AC 32%, Twin B: EFW 1095g (<1%), AC <1%, elevated Dopplers.    Received first dose of rescue ACS yesterday with plan for repeat dosing today. . Primary  scheduled for fetal malpresentation on 10/28 at 9AM. Continue twice weekly BPP/NSTs and weekly Dopplers, today with elevated Dopplers in baby B. Inpatient monitoring until delivery at 34 weeks or sooner if clinically indicated (worsening uncontrolled blood pressures, end organ dysfunction, non-reassuring fetal status). BCM: considering IUD at 6w visit.    Seen and d/w Dr. Wendy Bear, MFM Fellow

## 2024-10-25 NOTE — PROGRESS NOTE ADULT - ASSESSMENT
24y  at 33w4d admitted as a transfer from [facility] due to sPEC in the setting of severe FGR of baby B (di/di tiUP). Pt s/p Labetalol 20 IVP x1 at [facility] and has not required any more short acting antihypertensive medications during her inpatient hospital course. She has been on Nifedipine 30mg XL qd since her transfer here. BPs normotensive overnight and patient without any preeclamptic sx this AM    #Preeclampsia with severe features  - Tylenol and reglan PRN for headache. Patient asymptomatic this morning  - BP monitorin-130s/70s overnight  - HELLP wnl (repeat q3d). Labs pending next 10/26  - s/p Mg x24h  - s/p Lab 20 ()  - Continue Procardia 30XL daily  - Patient is scheduled for pCS on 10/28 pending any changes in clinical status    #Fetal wellbeing  -ATU (10/21): A. Breech. Ant plac. MVP 3.12. 1911g (18%). BPP 8/8. nl UAD | B Trv. Anterior placenta. MVP 2.77. 1095g (<1%, AC <1%). BPP 8/8. Elevated UAD  -ATU (10/17): A: breech. ant plac. MVP 5.98, BPP 8/8. B: Trv back down, head maternal right. Ant placenta. MVP 5.14. BPP 8/8 and intermittently elevated UAD (no AEDV)  -ATU (10/4): A: breech, maternal R, anterior placenta, 1494g, MVP 4.19, BPP 8/8. B: transverse head mat R, anterior placenta, MVP 3.39, 888g (<1%, AC <1%, intermittently elevated UAD), BPP 8/8  - NST BID  - PNV  - s/p BMZ (-). Consider rescue BMZ x2 days prior to scheduled c/s (starting 10/26)  - s/p NICU consult  - GBS: neg  - Having a male and female. Wants an IUD 6wks postpartum but deferring any bridging contraception     #Maternal wellbeing  - T&S q3d  - Regular diet  - HSQ for DVT     Edilberto Bolden, PGY-3  Obstetrics & Gynecology

## 2024-10-25 NOTE — PROGRESS NOTE ADULT - SUBJECTIVE AND OBJECTIVE BOX
R3 Antepartum Note, HD#27     Patient seen and examined at bedside, no acute overnight events. No acute complaints.   Pt reports +FM, denies LOF, VB, or ctx  Denies HA, epigastric pain, blurred vision, CP, SOB, or n/v    Vital Signs Last 24 Hours  T(C): 36.8 (10-25-24 @ 05:29), Max: 37.1 (10-24-24 @ 14:06)  HR: 103 (10-25-24 @ 05:29) (100 - 115)  BP: 119/72 (10-25-24 @ 05:29) (115/75 - 138/84)  RR: 18 (10-25-24 @ 05:29) (18 - 18)  SpO2: 95% (10-25-24 @ 05:29) (95% - 98%)    CAPILLARY BLOOD GLUCOSE          Physical Exam:  General: No acute distress. Resting comfortably  Pulm: Unlabored breathing. No respiratory distress   Abdomen: Soft. Non-tender. Gravid   Ext: No calf tenderness bilaterally        Labs:            Uric Acid: (10-23 @ 07:04)  4.1      Fibrinogen: (10-23 @ 07:04)  --       LDH: (10-23 @ 07:04)  222        MEDICATIONS  (STANDING):  betamethasone Injectable 12 milliGRAM(s) IntraMuscular every 24 hours  heparin   Injectable 5000 Unit(s) SubCutaneous every 12 hours  lactated ringers. 1000 milliLiter(s) (50 mL/Hr) IV Continuous <Continuous>  NIFEdipine XL 30 milliGRAM(s) Oral every 24 hours  prenatal multivitamin 1 Tablet(s) Oral daily    MEDICATIONS  (PRN):  acetaminophen     Tablet .. 975 milliGRAM(s) Oral every 6 hours PRN Mild Pain (1 - 3), Moderate Pain (4 - 6)  metoclopramide 10 milliGRAM(s) Oral every 8 hours PRN headache  ondansetron Injectable 4 milliGRAM(s) IV Push every 8 hours PRN Nausea and/or Vomiting

## 2024-10-26 LAB
ALBUMIN SERPL ELPH-MCNC: 3.6 G/DL — SIGNIFICANT CHANGE UP (ref 3.3–5)
ALP SERPL-CCNC: 201 U/L — HIGH (ref 40–120)
ALT FLD-CCNC: 11 U/L — SIGNIFICANT CHANGE UP (ref 10–45)
ANION GAP SERPL CALC-SCNC: 14 MMOL/L — SIGNIFICANT CHANGE UP (ref 5–17)
AST SERPL-CCNC: 17 U/L — SIGNIFICANT CHANGE UP (ref 10–40)
BASOPHILS # BLD AUTO: 0.03 K/UL — SIGNIFICANT CHANGE UP (ref 0–0.2)
BASOPHILS NFR BLD AUTO: 0.2 % — SIGNIFICANT CHANGE UP (ref 0–2)
BILIRUB SERPL-MCNC: 0.2 MG/DL — SIGNIFICANT CHANGE UP (ref 0.2–1.2)
BLD GP AB SCN SERPL QL: NEGATIVE — SIGNIFICANT CHANGE UP
BUN SERPL-MCNC: 7 MG/DL — SIGNIFICANT CHANGE UP (ref 7–23)
CALCIUM SERPL-MCNC: 9.5 MG/DL — SIGNIFICANT CHANGE UP (ref 8.4–10.5)
CHLORIDE SERPL-SCNC: 104 MMOL/L — SIGNIFICANT CHANGE UP (ref 96–108)
CO2 SERPL-SCNC: 17 MMOL/L — LOW (ref 22–31)
CREAT SERPL-MCNC: 0.38 MG/DL — LOW (ref 0.5–1.3)
EGFR: 142 ML/MIN/1.73M2 — SIGNIFICANT CHANGE UP
EOSINOPHIL # BLD AUTO: 0.01 K/UL — SIGNIFICANT CHANGE UP (ref 0–0.5)
EOSINOPHIL NFR BLD AUTO: 0.1 % — SIGNIFICANT CHANGE UP (ref 0–6)
GLUCOSE SERPL-MCNC: 122 MG/DL — HIGH (ref 70–99)
HCT VFR BLD CALC: 38.2 % — SIGNIFICANT CHANGE UP (ref 34.5–45)
HGB BLD-MCNC: 12.5 G/DL — SIGNIFICANT CHANGE UP (ref 11.5–15.5)
IMM GRANULOCYTES NFR BLD AUTO: 1.6 % — HIGH (ref 0–0.9)
LDH SERPL L TO P-CCNC: 163 U/L — SIGNIFICANT CHANGE UP (ref 50–242)
LYMPHOCYTES # BLD AUTO: 1.18 K/UL — SIGNIFICANT CHANGE UP (ref 1–3.3)
LYMPHOCYTES # BLD AUTO: 8.4 % — LOW (ref 13–44)
MCHC RBC-ENTMCNC: 27.5 PG — SIGNIFICANT CHANGE UP (ref 27–34)
MCHC RBC-ENTMCNC: 32.7 GM/DL — SIGNIFICANT CHANGE UP (ref 32–36)
MCV RBC AUTO: 84 FL — SIGNIFICANT CHANGE UP (ref 80–100)
MONOCYTES # BLD AUTO: 0.77 K/UL — SIGNIFICANT CHANGE UP (ref 0–0.9)
MONOCYTES NFR BLD AUTO: 5.5 % — SIGNIFICANT CHANGE UP (ref 2–14)
NEUTROPHILS # BLD AUTO: 11.79 K/UL — HIGH (ref 1.8–7.4)
NEUTROPHILS NFR BLD AUTO: 84.2 % — HIGH (ref 43–77)
NRBC # BLD: 0 /100 WBCS — SIGNIFICANT CHANGE UP (ref 0–0)
PLATELET # BLD AUTO: 254 K/UL — SIGNIFICANT CHANGE UP (ref 150–400)
POTASSIUM SERPL-MCNC: 3.9 MMOL/L — SIGNIFICANT CHANGE UP (ref 3.5–5.3)
POTASSIUM SERPL-SCNC: 3.9 MMOL/L — SIGNIFICANT CHANGE UP (ref 3.5–5.3)
PROT SERPL-MCNC: 6.8 G/DL — SIGNIFICANT CHANGE UP (ref 6–8.3)
RBC # BLD: 4.55 M/UL — SIGNIFICANT CHANGE UP (ref 3.8–5.2)
RBC # FLD: 14.2 % — SIGNIFICANT CHANGE UP (ref 10.3–14.5)
RH IG SCN BLD-IMP: POSITIVE — SIGNIFICANT CHANGE UP
SODIUM SERPL-SCNC: 135 MMOL/L — SIGNIFICANT CHANGE UP (ref 135–145)
URATE SERPL-MCNC: 3.6 MG/DL — SIGNIFICANT CHANGE UP (ref 2.5–7)
WBC # BLD: 14 K/UL — HIGH (ref 3.8–10.5)
WBC # FLD AUTO: 14 K/UL — HIGH (ref 3.8–10.5)

## 2024-10-26 PROCEDURE — 99231 SBSQ HOSP IP/OBS SF/LOW 25: CPT

## 2024-10-26 RX ADMIN — Medication 30 MILLIGRAM(S): at 05:42

## 2024-10-26 RX ADMIN — HEPARIN SODIUM 5000 UNIT(S): 10000 INJECTION INTRAVENOUS; SUBCUTANEOUS at 12:05

## 2024-10-26 RX ADMIN — Medication 1 TABLET(S): at 12:06

## 2024-10-26 RX ADMIN — HEPARIN SODIUM 5000 UNIT(S): 10000 INJECTION INTRAVENOUS; SUBCUTANEOUS at 20:46

## 2024-10-26 NOTE — PROGRESS NOTE ADULT - SUBJECTIVE AND OBJECTIVE BOX
R3 Antepartum Note    Patient seen and examined at bedside, no acute overnight events. No acute complaints. Pt reports +FM, denies LOF, VB, ctx, HA, epigastric pain, blurred vision, CP, SOB, N/V, fevers, and chills.    Vital Signs Last 24 Hours  T(C): 36.8 (10-26-24 @ 05:49), Max: 36.8 (10-25-24 @ 13:09)  HR: 106 (10-26-24 @ 05:49) (91 - 114)  BP: 122/55 (10-26-24 @ 05:49) (118/74 - 141/87)  RR: 18 (10-26-24 @ 05:49) (18 - 18)  SpO2: 97% (10-26-24 @ 05:49) (97% - 99%)      Physical Exam:  General: NAD  Abdomen: Soft, non-tender, gravid  Ext: No pain or swelling    NST reactive overnight      Uric Acid: (10-23 @ 07:04)  4.1      Fibrinogen: (10-23 @ 07:04)  --       LDH: (10-23 @ 07:04)  222        MEDICATIONS  (STANDING):  heparin   Injectable 5000 Unit(s) SubCutaneous every 12 hours  lactated ringers. 1000 milliLiter(s) (50 mL/Hr) IV Continuous <Continuous>  NIFEdipine XL 30 milliGRAM(s) Oral every 24 hours  prenatal multivitamin 1 Tablet(s) Oral daily    MEDICATIONS  (PRN):  acetaminophen     Tablet .. 975 milliGRAM(s) Oral every 6 hours PRN Mild Pain (1 - 3), Moderate Pain (4 - 6)  metoclopramide 10 milliGRAM(s) Oral every 8 hours PRN headache  ondansetron Injectable 4 milliGRAM(s) IV Push every 8 hours PRN Nausea and/or Vomiting

## 2024-10-26 NOTE — PROGRESS NOTE ADULT - ASSESSMENT
24y  at 33w5d admitted as a transfer from [facility] due to sPEC in the setting of severe FGR of baby B (di/di tiUP). Pt s/p Labetalol 20 IVP x1 at [facility] and has not required any more short acting antihypertensive medications during her inpatient hospital course. She has been on Nifedipine 30mg XL qd since her transfer here. BPs normotensive overnight and patient without any preeclamptic sx this AM    #Preeclampsia with severe features  - Tylenol and reglan PRN for headache. Patient asymptomatic this morning  - cw BP monitorin-130s/70s overnight  - HELLP wnl (repeat q3d). Labs pending next 10/26  - s/p Mg x24h  - s/p Lab 20 ()  - Continue Procardia 30XL daily  - Patient is scheduled for pCS on 10/28 pending any changes in clinical status    #Fetal wellbeing  - ATU(10/25):A:breech, ant plac, MVP 4.12, BPP8/8,UADwnl;B:trv, ant plac, MVP 3.92, BPP8/8,Elev UAD  -ATU (10/21): A. Breech. Ant plac. MVP 3.12. 1911g (18%). BPP 8/8. nl UAD | B Trv. Anterior placenta. MVP 2.77. 1095g (<1%, AC <1%). BPP 8/8. Elevated UAD  -ATU (10/17): A: breech. ant plac. MVP 5.98, BPP 8/8. B: Trv back down, head maternal right. Ant placenta. MVP 5.14. BPP 8/8 and intermittently elevated UAD (no AEDV)  -ATU (10/4): A: breech, maternal R, anterior placenta, 1494g, MVP 4.19, BPP 8/8. B: transverse head mat R, anterior placenta, MVP 3.39, 888g (<1%, AC <1%, intermittently elevated UAD), BPP 8/8  - NST BID  - PNV  - s/p BMZ (-). Consider rescue BMZ x2 days prior to scheduled c/s (starting 10/26)  - s/p NICU consult  - GBS: neg  - Having a male and female. Wants an IUD 6wks postpartum but deferring any bridging contraception     #Maternal wellbeing  - T&S q3d  - Regular diet  - HSQ for DVT       Danae Roca, PGY3

## 2024-10-27 PROCEDURE — 99231 SBSQ HOSP IP/OBS SF/LOW 25: CPT

## 2024-10-27 RX ADMIN — Medication 1 TABLET(S): at 11:51

## 2024-10-27 RX ADMIN — Medication 30 MILLIGRAM(S): at 06:12

## 2024-10-27 RX ADMIN — HEPARIN SODIUM 5000 UNIT(S): 10000 INJECTION INTRAVENOUS; SUBCUTANEOUS at 09:20

## 2024-10-27 NOTE — PROGRESS NOTE ADULT - ASSESSMENT
24y  at 33w6d admitted as a transfer from [facility] due to sPEC in the setting of severe FGR of baby B (di/di tiUP). Pt s/p Labetalol 20 IVP x1 at [facility] and has not required any more short acting antihypertensive medications during her inpatient hospital course. She has been on Nifedipine 30mg XL qd since her transfer here. BPs normotensive overnight and patient without any preeclamptic sx this AM    #Preeclampsia with severe features  - Tylenol and reglan PRN for headache. Patient asymptomatic this morning  - cw BP monitorin-130s/70-80s overnight  - HELLP wnl (repeat q3d). Labs pending next 10/29  - s/p Mg x24h  - s/p Lab 20 ()  - Continue Procardia 30XL daily  - Patient is scheduled for pCS on 10/28 pending any changes in clinical status    #Fetal wellbeing  - ATU(10/25):A:breech, ant plac, MVP 4.12, BPP8/8,UADwnl;B:trv, ant plac, MVP 3.92, BPP8/8,Elev UAD  -ATU (10/21): A. Breech. Ant plac. MVP 3.12. 1911g (18%). BPP 8/8. nl UAD | B Trv. Anterior placenta. MVP 2.77. 1095g (<1%, AC <1%). BPP 8/8. Elevated UAD  -ATU (10/17): A: breech. ant plac. MVP 5.98, BPP 8/8. B: Trv back down, head maternal right. Ant placenta. MVP 5.14. BPP 8/8 and intermittently elevated UAD (no AEDV)  -ATU (10/4): A: breech, maternal R, anterior placenta, 1494g, MVP 4.19, BPP 8/8. B: transverse head mat R, anterior placenta, MVP 3.39, 888g (<1%, AC <1%, intermittently elevated UAD), BPP 8/8  - NST BID  - PNV  - s/p BMZ (-). s/p rescue BMZ (10/24-25)  - s/p NICU consult  - GBS: neg  - Having a male and female. Wants an IUD 6wks postpartum but deferring any bridging contraception    #Maternal wellbeing  - T&S q3d  - Regular diet  - HSQ for DVT    Edilberto Bolden, PGY-3  Obstetrics & Gynecology

## 2024-10-27 NOTE — PROGRESS NOTE ADULT - SUBJECTIVE AND OBJECTIVE BOX
R3 Antepartum Note, HD#29     Patient seen and examined at bedside, no acute overnight events. No acute complaints.   Pt reports +FM, denies LOF, VB, or ctx  Denies HA, epigastric pain, blurred vision, CP, SOB, or n/v    Vital Signs Last 24 Hours  T(C): 37.1 (10-27-24 @ 00:11), Max: 37.1 (10-26-24 @ 09:00)  HR: 105 (10-27-24 @ 00:11) (98 - 111)  BP: 130/79 (10-27-24 @ 00:11) (125/77 - 132/79)  RR: 18 (10-27-24 @ 00:11) (17 - 18)  SpO2: 97% (10-27-24 @ 00:11) (97% - 98%)    CAPILLARY BLOOD GLUCOSE          Physical Exam:  General: No acute distress. Resting comfortably prior to eval  Pulm: Unlabored breathing. No respiratory distress   Abdomen: Soft. Non-tender. Gravid   Ext: No calf tenderness bilaterally        Labs:             12.5   14.00 )-----------( 254      ( 10-26 @ 06:06 )             38.2     10-26 @ 06:06    135  |  104  |  7   ----------------------------<  122  3.9   |  17  |  0.38    Ca    9.5      10-26 @ 06:06    TPro  6.8  /  Alb  3.6  /  TBili  0.2  /  DBili  x   /  AST  17  /  ALT  11  /  AlkPhos  201  10-26 @ 06:06        Uric Acid: (10-26 @ 06:06)  3.6      Fibrinogen: (10-26 @ 06:06)  --       LDH: (10-26 @ 06:06)  163        MEDICATIONS  (STANDING):  heparin   Injectable 5000 Unit(s) SubCutaneous every 12 hours  lactated ringers. 1000 milliLiter(s) (50 mL/Hr) IV Continuous <Continuous>  NIFEdipine XL 30 milliGRAM(s) Oral every 24 hours  prenatal multivitamin 1 Tablet(s) Oral daily    MEDICATIONS  (PRN):  acetaminophen     Tablet .. 975 milliGRAM(s) Oral every 6 hours PRN Mild Pain (1 - 3), Moderate Pain (4 - 6)  metoclopramide 10 milliGRAM(s) Oral every 8 hours PRN headache  ondansetron Injectable 4 milliGRAM(s) IV Push every 8 hours PRN Nausea and/or Vomiting

## 2024-10-27 NOTE — PROGRESS NOTE ADULT - TIME BILLING
Thank you for requesting a MFM consultation on this patient for the entities listed above. The total time spent in preparation for this visit, medical history taking, orders, review of records, counseling the patient and the family member and writing the note was 20 minutes.
31 weeks gestation, dichorionic twins, fetal growth restriction of twin B, preeclampsia with severe features
31 weeks gestation, dichorionic twins, severe growth restriction of twin B, preeclampsia with severe features
31 weeks, dichorionic twin pregnancy, FGR of twin B, preeclampsia with severe features
31 weeks, dichorionic twins in third trimester, fetal growth restriction of twin B, suspected placental insufficiency of twin B, preeclampsia with severe features
31 weeks, dichorionic twins, fetal growth restriction twin B, preeclampsia with severe features
31 weeks, dichorionic twins, severe growth restriction of twin B, preeclampsia with severe features
Thank you for requesting a MFM consultation on this patient for the entities listed above. The total time spent in preparation for this visit, medical history taking, orders, review of records, counseling the patient and the family member and writing the note was 20 minutes.
Thank you for requesting a MFM consultation on this patient for the entities listed above. The total time spent in preparation for this visit, medical history taking, orders, review of records, counseling the patient and the family member and writing the note was 35 minutes.
31 weeks, dichorionic twin gestation, severe growth restriction of twin B, preeclampsia with severe features
Thank you for requesting a MFM consultation on this patient for the entities listed above. The total time spent in preparation for this visit, medical history taking, orders, review of records, counseling the patient and the family member and writing the note was 20 minutes.
Thank you for requesting a MFM consultation on this patient for the entities listed above. The total time spent in preparation for this visit, medical history taking, orders, review of records, counseling the patient and the family member and writing the note was 20 minutes.
Thank you for requesting a MFM consultation on this patient for the entities listed above. The total time spent in preparation for this visit, medical history taking, orders, review of records, counseling the patient and the family member and writing the note was 35 minutes.
Thank you for requesting a MFM consultation on this patient for the entities listed above. The total time spent in preparation for this visit, medical history taking, orders, review of records, counseling the patient and the family member and writing the note was 35 minutes.

## 2024-10-28 ENCOUNTER — TRANSCRIPTION ENCOUNTER (OUTPATIENT)
Age: 24
End: 2024-10-28

## 2024-10-28 LAB
ALBUMIN SERPL ELPH-MCNC: 3.4 G/DL — SIGNIFICANT CHANGE UP (ref 3.3–5)
ALP SERPL-CCNC: 192 U/L — HIGH (ref 40–120)
ALT FLD-CCNC: 9 U/L — LOW (ref 10–45)
ANION GAP SERPL CALC-SCNC: 14 MMOL/L — SIGNIFICANT CHANGE UP (ref 5–17)
AST SERPL-CCNC: 14 U/L — SIGNIFICANT CHANGE UP (ref 10–40)
BASOPHILS # BLD AUTO: 0.05 K/UL — SIGNIFICANT CHANGE UP (ref 0–0.2)
BASOPHILS NFR BLD AUTO: 0.5 % — SIGNIFICANT CHANGE UP (ref 0–2)
BILIRUB SERPL-MCNC: 0.2 MG/DL — SIGNIFICANT CHANGE UP (ref 0.2–1.2)
BLD GP AB SCN SERPL QL: NEGATIVE — SIGNIFICANT CHANGE UP
BUN SERPL-MCNC: 7 MG/DL — SIGNIFICANT CHANGE UP (ref 7–23)
CALCIUM SERPL-MCNC: 9.2 MG/DL — SIGNIFICANT CHANGE UP (ref 8.4–10.5)
CHLORIDE SERPL-SCNC: 103 MMOL/L — SIGNIFICANT CHANGE UP (ref 96–108)
CO2 SERPL-SCNC: 19 MMOL/L — LOW (ref 22–31)
CREAT SERPL-MCNC: 0.36 MG/DL — LOW (ref 0.5–1.3)
EGFR: 144 ML/MIN/1.73M2 — SIGNIFICANT CHANGE UP
EOSINOPHIL # BLD AUTO: 0.1 K/UL — SIGNIFICANT CHANGE UP (ref 0–0.5)
EOSINOPHIL NFR BLD AUTO: 0.9 % — SIGNIFICANT CHANGE UP (ref 0–6)
GLUCOSE SERPL-MCNC: 82 MG/DL — SIGNIFICANT CHANGE UP (ref 70–99)
HCT VFR BLD CALC: 37.4 % — SIGNIFICANT CHANGE UP (ref 34.5–45)
HGB BLD-MCNC: 12.2 G/DL — SIGNIFICANT CHANGE UP (ref 11.5–15.5)
IMM GRANULOCYTES NFR BLD AUTO: 1.8 % — HIGH (ref 0–0.9)
LDH SERPL L TO P-CCNC: 147 U/L — SIGNIFICANT CHANGE UP (ref 50–242)
LYMPHOCYTES # BLD AUTO: 1.88 K/UL — SIGNIFICANT CHANGE UP (ref 1–3.3)
LYMPHOCYTES # BLD AUTO: 17.3 % — SIGNIFICANT CHANGE UP (ref 13–44)
MAGNESIUM SERPL-MCNC: 4.4 MG/DL — HIGH (ref 1.6–2.6)
MAGNESIUM SERPL-MCNC: 4.8 MG/DL — HIGH (ref 1.6–2.6)
MCHC RBC-ENTMCNC: 27.3 PG — SIGNIFICANT CHANGE UP (ref 27–34)
MCHC RBC-ENTMCNC: 32.6 GM/DL — SIGNIFICANT CHANGE UP (ref 32–36)
MCV RBC AUTO: 83.7 FL — SIGNIFICANT CHANGE UP (ref 80–100)
MONOCYTES # BLD AUTO: 1.07 K/UL — HIGH (ref 0–0.9)
MONOCYTES NFR BLD AUTO: 9.8 % — SIGNIFICANT CHANGE UP (ref 2–14)
NEUTROPHILS # BLD AUTO: 7.58 K/UL — HIGH (ref 1.8–7.4)
NEUTROPHILS NFR BLD AUTO: 69.7 % — SIGNIFICANT CHANGE UP (ref 43–77)
NRBC # BLD: 0 /100 WBCS — SIGNIFICANT CHANGE UP (ref 0–0)
PLATELET # BLD AUTO: 237 K/UL — SIGNIFICANT CHANGE UP (ref 150–400)
POTASSIUM SERPL-MCNC: 3.7 MMOL/L — SIGNIFICANT CHANGE UP (ref 3.5–5.3)
POTASSIUM SERPL-SCNC: 3.7 MMOL/L — SIGNIFICANT CHANGE UP (ref 3.5–5.3)
PROT SERPL-MCNC: 6.5 G/DL — SIGNIFICANT CHANGE UP (ref 6–8.3)
RBC # BLD: 4.47 M/UL — SIGNIFICANT CHANGE UP (ref 3.8–5.2)
RBC # FLD: 14.1 % — SIGNIFICANT CHANGE UP (ref 10.3–14.5)
RH IG SCN BLD-IMP: POSITIVE — SIGNIFICANT CHANGE UP
SODIUM SERPL-SCNC: 136 MMOL/L — SIGNIFICANT CHANGE UP (ref 135–145)
URATE SERPL-MCNC: 3.9 MG/DL — SIGNIFICANT CHANGE UP (ref 2.5–7)
WBC # BLD: 10.88 K/UL — HIGH (ref 3.8–10.5)
WBC # FLD AUTO: 10.88 K/UL — HIGH (ref 3.8–10.5)

## 2024-10-28 PROCEDURE — 88307 TISSUE EXAM BY PATHOLOGIST: CPT | Mod: 26

## 2024-10-28 PROCEDURE — 59514 CESAREAN DELIVERY ONLY: CPT | Mod: U7,GC

## 2024-10-28 RX ORDER — DEXAMETHASONE 1.5 MG 1.5 MG/1
4 TABLET ORAL EVERY 6 HOURS
Refills: 0 | Status: DISCONTINUED | OUTPATIENT
Start: 2024-10-28 | End: 2024-10-29

## 2024-10-28 RX ORDER — OXYCODONE HYDROCHLORIDE 30 MG/1
5 TABLET ORAL
Refills: 0 | Status: DISCONTINUED | OUTPATIENT
Start: 2024-10-28 | End: 2024-10-29

## 2024-10-28 RX ORDER — CITRIC ACID/SODIUM CITRATE 334-500MG
30 SOLUTION, ORAL ORAL ONCE
Refills: 0 | Status: COMPLETED | OUTPATIENT
Start: 2024-10-28 | End: 2024-10-28

## 2024-10-28 RX ORDER — ACETAMINOPHEN 500 MG
975 TABLET ORAL
Refills: 0 | Status: DISCONTINUED | OUTPATIENT
Start: 2024-10-28 | End: 2024-11-01

## 2024-10-28 RX ORDER — MORPHINE SULFATE 30 MG/1
0.1 TABLET, EXTENDED RELEASE ORAL ONCE
Refills: 0 | Status: DISCONTINUED | OUTPATIENT
Start: 2024-10-28 | End: 2024-10-29

## 2024-10-28 RX ORDER — NALBUPHINE HCL 100 %
2.5 POWDER (GRAM) MISCELLANEOUS EVERY 6 HOURS
Refills: 0 | Status: DISCONTINUED | OUTPATIENT
Start: 2024-10-28 | End: 2024-10-29

## 2024-10-28 RX ORDER — MAGNESIUM SULFATE IN 0.9% NACL 2 G/50 ML
4 INTRAVENOUS SOLUTION, PIGGYBACK (ML) INTRAVENOUS ONCE
Refills: 0 | Status: COMPLETED | OUTPATIENT
Start: 2024-10-28 | End: 2024-10-28

## 2024-10-28 RX ORDER — NIFEDIPINE 90 MG
1 TABLET, EXTENDED RELEASE 24 HR ORAL
Refills: 0
Start: 2024-10-28

## 2024-10-28 RX ORDER — ONDANSETRON HYDROCHLORIDE 2 MG/ML
4 INJECTION, SOLUTION INTRAMUSCULAR; INTRAVENOUS EVERY 6 HOURS
Refills: 0 | Status: DISCONTINUED | OUTPATIENT
Start: 2024-10-28 | End: 2024-10-29

## 2024-10-28 RX ORDER — MAGNESIUM SULFATE IN 0.9% NACL 2 G/50 ML
2 INTRAVENOUS SOLUTION, PIGGYBACK (ML) INTRAVENOUS
Qty: 40 | Refills: 0 | Status: DISCONTINUED | OUTPATIENT
Start: 2024-10-28 | End: 2024-11-01

## 2024-10-28 RX ORDER — NALOXONE HYDROCHLORIDE 1 MG/ML
0.1 INJECTION, SOLUTION INTRAMUSCULAR; INTRAVENOUS; SUBCUTANEOUS
Refills: 0 | Status: DISCONTINUED | OUTPATIENT
Start: 2024-10-28 | End: 2024-10-29

## 2024-10-28 RX ORDER — MAGNESIUM SULFATE IN 0.9% NACL 2 G/50 ML
2 INTRAVENOUS SOLUTION, PIGGYBACK (ML) INTRAVENOUS
Qty: 40 | Refills: 0 | Status: DISCONTINUED | OUTPATIENT
Start: 2024-10-28 | End: 2024-10-28

## 2024-10-28 RX ORDER — HEPARIN SODIUM 10000 [USP'U]/ML
5000 INJECTION INTRAVENOUS; SUBCUTANEOUS EVERY 12 HOURS
Refills: 0 | Status: DISCONTINUED | OUTPATIENT
Start: 2024-10-28 | End: 2024-11-01

## 2024-10-28 RX ORDER — DIPHENOXYLATE HYDROCHLORIDE AND ATROPINE SULFATE 2.5; .025 MG/1; MG/1
2 TABLET ORAL ONCE
Refills: 0 | Status: DISCONTINUED | OUTPATIENT
Start: 2024-10-28 | End: 2024-10-28

## 2024-10-28 RX ORDER — KETOROLAC TROMETHAMINE 30 MG/ML
30 INJECTION INTRAMUSCULAR; INTRAVENOUS EVERY 6 HOURS
Refills: 0 | Status: DISCONTINUED | OUTPATIENT
Start: 2024-10-28 | End: 2024-10-29

## 2024-10-28 RX ORDER — FAMOTIDINE 10 MG/ML
20 INJECTION INTRAVENOUS ONCE
Refills: 0 | Status: COMPLETED | OUTPATIENT
Start: 2024-10-28 | End: 2024-10-28

## 2024-10-28 RX ADMIN — KETOROLAC TROMETHAMINE 30 MILLIGRAM(S): 30 INJECTION INTRAMUSCULAR; INTRAVENOUS at 19:25

## 2024-10-28 RX ADMIN — DIPHENOXYLATE HYDROCHLORIDE AND ATROPINE SULFATE 2 TABLET(S): 2.5; .025 TABLET ORAL at 12:01

## 2024-10-28 RX ADMIN — Medication 30 MILLILITER(S): at 08:56

## 2024-10-28 RX ADMIN — KETOROLAC TROMETHAMINE 30 MILLIGRAM(S): 30 INJECTION INTRAMUSCULAR; INTRAVENOUS at 18:55

## 2024-10-28 RX ADMIN — FAMOTIDINE 20 MILLIGRAM(S): 10 INJECTION INTRAVENOUS at 08:57

## 2024-10-28 RX ADMIN — Medication 300 GRAM(S): at 08:57

## 2024-10-28 RX ADMIN — KETOROLAC TROMETHAMINE 30 MILLIGRAM(S): 30 INJECTION INTRAMUSCULAR; INTRAVENOUS at 23:46

## 2024-10-28 RX ADMIN — Medication 975 MILLIGRAM(S): at 21:33

## 2024-10-28 RX ADMIN — Medication 50 MILLILITER(S): at 08:58

## 2024-10-28 RX ADMIN — HEPARIN SODIUM 5000 UNIT(S): 10000 INJECTION INTRAVENOUS; SUBCUTANEOUS at 21:03

## 2024-10-28 RX ADMIN — Medication 30 MILLIGRAM(S): at 05:40

## 2024-10-28 RX ADMIN — Medication 975 MILLIGRAM(S): at 21:03

## 2024-10-28 NOTE — DISCHARGE NOTE OB - CARE PROVIDER_API CALL
Megan Soto  Obstetrics and Gynecology  [address] [facility], [address] [address]  Phone: [phone]  Fax: [phone]  Established Patient  Follow Up Time: 2 weeks

## 2024-10-28 NOTE — DISCHARGE NOTE OB - MEDICATION SUMMARY - MEDICATIONS TO STOP TAKING
I will STOP taking the medications listed below when I get home from the hospital:    cephalexin 500 mg oral tablet  -- 1 tab(s) by mouth 4 times a day    aspirin 81 mg oral tablet  -- 1 by mouth once a day

## 2024-10-28 NOTE — OB RN DELIVERY SUMMARY - BABY A: WEIGHT IN OUNCES (FROM GRAMS), DELIVERY
P.T. EVAL COMPLETE.  PT CURRENTLY REQUIRES SBA FOR SEATED SCOOT, CGA FOR TF'S AND GAIT WITH RW.  P.T. RECOMMENDS HHPT AT D/C     12

## 2024-10-28 NOTE — OB RN DELIVERY SUMMARY - NSCSDELIVBASS_OBGYN_ALL_OB
Problem: Plan of Care - These are the overarching goals to be used throughout the patient stay.    Goal: Optimal Comfort and Wellbeing  Outcome: Progressing     Problem: Risk for Delirium  Goal: Improved Behavioral Control  Outcome: Progressing     Problem: Gas Exchange Impaired  Goal: Optimal Gas Exchange  Outcome: Progressing   Goal Outcome Evaluation:       Pt removed from bilateral soft wrist restraints around 0200. Disoriented to time, redirectable. 1:1 sitter removed around 0600, as pts confusion cleared.     Afebrile overnight.   Tolerating IV abx.     Pt concerned that he has a radiology appt scheduled for today and is worried about having to reschedule.     UA sent; results pending.   Awaiting sputum sample for respiratory panel.                   N/A

## 2024-10-28 NOTE — OB RN DELIVERY SUMMARY - NS_SKINTOSKINA_OBGYN_ALL_OB
Spoke to pt, first available appt with Dr. Betancourt is mid July. Pt already has a rheumatologist, he declined a July appt with Dr. Betancourt.    Was not done

## 2024-10-28 NOTE — OB PROVIDER DELIVERY SUMMARY - NSSELHIDDEN_OBGYN_ALL_OB_FT
[NS_DeliveryAttending1_OBGYN_ALL_OB_FT:[id number]],[NS_DeliveryAssist1_OBGYN_ALL_OB_FT:[id number]],[NS_DeliveryRN_OBGYN_ALL_OB_FT:[id number]],[NS_CirculateRN2_OBGYN_ALL_OB_FT:[id number]]

## 2024-10-28 NOTE — OB NEONATOLOGY/PEDIATRICIAN DELIVERY SUMMARY - NSPEDSNEONOTESB_OBGYN_ALL_OB_FT
Requested to attend primary C/S for PEC and severe growth restriction of twin B of 34 week di di twins. Mother is a 24 year old  with prenatal labs neg, NR and immune, GBS neg, blood type O pos. Pregnancy complicated by PEC on Procardia and Magnesium, severe IUGR if twin B < 1 %, and breech presentation in twin A and B. Received BMZ on - and 10/24-10/25. AROM at delivery with clear fluid, infant  was transverse prior to delivery but delivered  breech with good tone and weak cry. Delayed cord clamping x 30 seconds. CPAP 5 21% applied at 4 mins of life for retractions.  Apgars 8/9. Transferred to NICU on CPAP 5 for further evaluation and management of respiratory distress and prematurity.

## 2024-10-28 NOTE — DISCHARGE NOTE OB - HOSPITAL COURSE
Patient was transferred to [facility] from [facility] on 2024 at 29w6d after initially presenting with abdominal pain and headache. Patient had severe range BPs and received Labetalol 20mg IVP. She was started on BMZ and Mg. patient was transferred for higher level of NICU care. During her hospital course, she finished her BMZ and received an additional course of rescue BMZ prior to delivery by  on 10/28/2024. Throughout her hospital course, her labs were overall reassuring and she remained on Nifedipine 30mg XL qd. QBL 1335cc.     Patient’s postoperative course was unremarkable and she remained hemodynamically stable and afebrile throughout. Upon discharge on POD3, the patient is ambulating and voiding spontaneously, tolerating oral intake, pain was well controlled with oral medication, and vital signs were stable. Patient was transferred to [facility] from [facility] on 2024 at 29w6d after initially presenting with abdominal pain and headache. Patient had severe range BPs and received Labetalol 20mg IVP. She was started on BMZ and Mg. patient was transferred for higher level of NICU care. During her hospital course, she finished her BMZ and received an additional course of rescue BMZ prior to delivery by  on 10/28/2024. Throughout her hospital course, her labs were overall reassuring and she remained on Nifedipine 30mg XL qd. QBL 1335cc.     Patient’s postoperative course was unremarkable and she remained hemodynamically stable and afebrile throughout. Upon discharge, the patient is ambulating and voiding spontaneously, tolerating oral intake, pain was well controlled with oral medication, and vital signs were stable.

## 2024-10-28 NOTE — DISCHARGE NOTE OB - PATIENT PORTAL LINK FT
You can access the FollowMyHealth Patient Portal offered by [facility] by registering at the following website: [email or web address]. By joining [facility]’s FollowMyHealth portal, you will also be able to view your health information using other applications (apps) compatible with our system.

## 2024-10-28 NOTE — DISCHARGE NOTE OB - PLAN OF CARE
Please obtain a blood pressure cuff. A prescription was sent to [facility]. Take your blood pressure two times a day at the same time. Call the clinic if your blood pressure if greater than 140 systolic (top number) and/or 90 diastolic (bottom number). Please go to the hospital if the systolic (top number) is 160 or greater and/or if the diastolic (bottom number) is 110 or greater. Follow-up with your Ob/Gyn in 2-3 days for a blood pressure check    Call the clinic and/or go the hospital if you experience a headache not relieved by medication, severe abdominal pain, new onset changes in vision, worsening of lower extremity swelling or edema.    Please take your Nifedipine (aka Procardia) as prescribed. Do not take the medication if your blood pressure is less than 90 (top number) and/or less than 60 (bottom number) After discharge, please stay on pelvic rest for 6 weeks, meaning no sexual intercourse, no tampons and no douching.  No driving for 2 weeks as women can loose a lot of blood during delivery and there is a possibility of being lightheaded/fainting. No lifting objects heavier than baby for two weeks.  Expect to have vaginal bleeding/spotting for up to six weeks. The bleeding should get lighter and more white/light brown with time.  For bleeding soaking more than a pad an hour or passing clots greater than the size of your fist, come in to the emergency department.    Follow up with your doctor in 2 weeks for incision check.  Call clinic for noticeable increase in redness or swelling at incision, discharge from incision, or opening of skin at incision site.

## 2024-10-28 NOTE — DISCHARGE NOTE OB - FALL HARM RISK - PT AGE POPULATION HIDDEN
Chief Complaint   Patient presents with    Abdomen/Flank Pain     Patient aox3 to ed via private vehicle co of lower quadrant abd pain x yesterday, patient also started on her menstrual cycle.     Patient changed into gown on nibp and spo2 monitors. Side railsx2 call light within reach, family at bedside  
Dr. Perez at bedside for re-assessment   
Adult

## 2024-10-28 NOTE — PRE-ANESTHESIA EVALUATION ADULT - NSANTHRISKNONERD_GEN_ALL_CORE
----- Message from Tucker Westfall LPN sent at 12/6/2019 10:28 AM CST -----  Contact: self      ----- Message -----  From: Zuri May  Sent: 12/6/2019  10:12 AM CST  To: [facility] Staff    Patient need to speak to nurse regarding patient has procedure for endoscopy to be done in [facility] by another doctor , patient states she will like to have Dr. Gonzales do procedure instead , procedure is scheduled for 12/20 per patient       Please call to advice [phone] (home)     Patient states she need appt this month if possible due to deductible   
Called the patient in reference to request for another Dr to do an EGD, Reference to Dr Gonzales, left message.  
No risk alerts present

## 2024-10-28 NOTE — OB RN DELIVERY SUMMARY - NS_PLACENTA_OBGYN_ALL_OB_DT
TCM chart review.  No TCM as patient follows with outside [facility] PCP.  Encounter closing.      28-Oct-2024 10:37

## 2024-10-28 NOTE — DISCHARGE NOTE OB - INSTRUCTIONS
any increase in temp 100.4 or above or bleeding call MD or go to Emergency Room Take and record BP 2x/day if BP is 140/90 or above call MD if /60 or below hold medication x 1 hr then retake BP if still below 110/60 call MD S&S of PEC and PBWS reviewed

## 2024-10-28 NOTE — PRE-ANESTHESIA EVALUATION ADULT - NSANTHPMHFT_GEN_ALL_CORE
sPEC now controlled with antihypertensives, childhood asthma which is exacerbated by COVID per patient (no current symptoms, last used rescue inhaler in march).

## 2024-10-28 NOTE — PROGRESS NOTE ADULT - ASSESSMENT
24y  at 34w60 admitted as a transfer from [facility] due to sPEC in the setting of severe FGR of baby B (di/di tiUP). Pt s/p Labetalol 20 IVP x1 at [facility] and has not required any more short acting antihypertensive medications during her inpatient hospital course. She has been on Nifedipine 30mg XL qd since her transfer here. BPs reassuring overnight and patient without any preeclamptic sx this AM    Plan is for delivery via pCS today (consents signed at the bedside). Will be for 24hrs of Mg postpartum    #Preeclampsia with severe features  - Tylenol and reglan PRN for headache. Patient asymptomatic this morning  - cw BP monitorin-143/83-86 overnight  - HELLP wnl (repeat q3d). Labs this AM  - s/p Mg x24h  - s/p Lab 20 ()  - Continue Procardia 30XL daily    #Fetal wellbeing  - ATU(10/25):A:breech, ant plac, MVP 4.12, BPP8/8,UADwnl;B:trv, ant plac, MVP 3.92, BPP8/8,Elev UAD  -ATU (10/21): A. Breech. Ant plac. MVP 3.12. 1911g (18%). BPP 8/8. nl UAD | B Trv. Anterior placenta. MVP 2.77. 1095g (<1%, AC <1%). BPP 8/8. Elevated UAD  -ATU (10/17): A: breech. ant plac. MVP 5.98, BPP 8/8. B: Trv back down, head maternal right. Ant placenta. MVP 5.14. BPP 8/8 and intermittently elevated UAD (no AEDV)  -ATU (10/4): A: breech, maternal R, anterior placenta, 1494g, MVP 4.19, BPP 8/8. B: transverse head mat R, anterior placenta, MVP 3.39, 888g (<1%, AC <1%, intermittently elevated UAD), BPP 8/8  - NST BID  - PNV  - s/p BMZ (-). s/p rescue BMZ (10/24-25)  - s/p NICU consult  - GBS: neg  - Having a male and female. Wants an IUD 6wks postpartum but deferring any bridging contraception    #Maternal wellbeing  - T&S q3d  - Regular diet  - HSQ for DVT    Edilberto Bolden, PGY-3  Obstetrics & Gynecology

## 2024-10-28 NOTE — OB RN INTRAOPERATIVE NOTE - NSSELHIDDEN_OBGYN_ALL_OB_FT
[NS_DeliveryAttending1_OBGYN_ALL_OB_FT:[id number]],[NS_DeliveryAssist1_OBGYN_ALL_OB_FT:[id number]],[NS_DeliveryRN_OBGYN_ALL_OB_FT:[id number]],[NS_CirculateRN2_OBGYN_ALL_OB_FT:[id number]]

## 2024-10-28 NOTE — DISCHARGE NOTE OB - MEDICATION SUMMARY - MEDICATIONS TO TAKE
I will START or STAY ON the medications listed below when I get home from the hospital:    BP cuff  -- Take BP 2-3/day  -- Indication: For preeclampsia    acetaminophen 325 mg oral tablet  -- 3 tab(s) by mouth every 6 hours As needed Mild Pain (1 - 3), Moderate Pain (4 - 6)  -- Indication: For pain    ibuprofen 600 mg oral tablet  -- 1 tab(s) by mouth every 6 hours  -- Indication: For pain    acetaminophen 325 mg oral tablet  -- 3 tab(s) by mouth every 6 hours  -- Indication: For pain    NIFEdipine (Eqv-Procardia XL) 30 mg oral tablet, extended release  -- 1 tab(s) by mouth once a day  -- Indication: For preeclampsia

## 2024-10-28 NOTE — DISCHARGE NOTE OB - CARE PLAN
Principal Discharge DX:	Severe preeclampsia  Assessment and plan of treatment:	Please obtain a blood pressure cuff. A prescription was sent to [facility] pharmacy. Take your blood pressure two times a day at the same time. Call the clinic if your blood pressure if greater than 140 systolic (top number) and/or 90 diastolic (bottom number). Please go to the hospital if the systolic (top number) is 160 or greater and/or if the diastolic (bottom number) is 110 or greater. Follow-up with your Ob/Gyn in 2-3 days for a blood pressure check    Call the clinic and/or go the hospital if you experience a headache not relieved by medication, severe abdominal pain, new onset changes in vision, worsening of lower extremity swelling or edema.    Please take your Nifedipine (aka Procardia) as prescribed. Do not take the medication if your blood pressure is less than 90 (top number) and/or less than 60 (bottom number)  Secondary Diagnosis:	Twin pregnancy, delivered by  section, current hospitalization  Assessment and plan of treatment:	After discharge, please stay on pelvic rest for 6 weeks, meaning no sexual intercourse, no tampons and no douching.  No driving for 2 weeks as women can loose a lot of blood during delivery and there is a possibility of being lightheaded/fainting. No lifting objects heavier than baby for two weeks.  Expect to have vaginal bleeding/spotting for up to six weeks. The bleeding should get lighter and more white/light brown with time.  For bleeding soaking more than a pad an hour or passing clots greater than the size of your fist, come in to the emergency department.    Follow up with your doctor in 2 weeks for incision check.  Call clinic for noticeable increase in redness or swelling at incision, discharge from incision, or opening of skin at incision site.   1

## 2024-10-28 NOTE — DISCHARGE NOTE OB - NS AS DC PROVIDER CONTACT Y/N MULTI
Returned call to Pt, Daughter Chiara answered call, informed Chiara cochran is now out of office and has not responded to my messages. Informed pt We can wait to see what dimas response once she is back in office tomorrow and in the mean time offered to reschedule apt again for tomorrow 08/01/24 at 5pm. Petra Guadarrama refused and continued to get upset and repeating pt cannot come into office in person for appt. Asked Chiara if she would like for me to cancel today's virtual appt. Informed me yes and would like a call back once dimas responds. No further questions at the moment.     Yes

## 2024-10-28 NOTE — OB RN DELIVERY SUMMARY - NSSELHIDDEN_OBGYN_ALL_OB_FT
[NS_DeliveryAttending1_OBGYN_ALL_OB_FT:[id number]],[NS_DeliveryAssist1_OBGYN_ALL_OB_FT:[id number]],[NS_DeliveryRN_OBGYN_ALL_OB_FT:[id number]],[NS_CirculateRN2_OBGYN_ALL_OB_FT:[id number]]

## 2024-10-28 NOTE — OB PROVIDER LABOR PROGRESS NOTE - ASSESSMENT
patient aware of need for C/S due to discussions with MFM. Discussed RBA to primary C/S for twins at 34 weeks, breech/transverse with twin B FGR. Offered to answer any questions for patient and family. Expressed understanding of possible hemorrhage due to overdistended uterus necessitating the following - including but not limited to transfusion, various medications, additional procedures including hysterectomy if necessary. Patient accepts all of the above.  Carolina BONNER

## 2024-10-28 NOTE — DISCHARGE NOTE OB - FINANCIAL ASSISTANCE
[facility] provides services at a reduced cost to those who are determined to be eligible through [facility]’s financial assistance program. Information regarding [facility]’s financial assistance program can be found by going to [email or web address] or by calling [phone].

## 2024-10-28 NOTE — OB PROVIDER DELIVERY SUMMARY - NSPROVIDERDELIVERYNOTE_OBGYN_ALL_OB_FT
pLTCS for malpresentation of twin A. Delivered at 34w0d in the setting of PEC with severe features    Liveborn male infant delivered in elvia breech presentation. APGARS 8/8. 2160g. 1min of delayed cord clamping  Liveborn female infant (B) delivered in [place] breech presentation. APGARS 8/9. Cord immediately clamped and cut   Grossly normal uterus and ovaries. Left tube w/ paratubal cysts, largest 1.5cm  Hysterotomy closed in x1 layer w/ 1-0 PDS   - Intrauterine Hemabate injected near focal area of bleeding with good effect   Fascia reapproximated w/ 0-0 Vicryl in a running fashion   SubQ reapproximated w/ 2-0 plain gut in a running fashion in x3 layers  Skin reapproximated w/ 3-0 Monocryl in a subcuticular fashion     1335/1700/425  - For 24hrs of Mg Postpartum     Dictation #: pLTCS for malpresentation of twin A. Delivered at 34w0d in the setting of PEC with severe features    Liveborn male infant delivered in elvia breech presentation. APGARS 8/8. 2160g. 1min of delayed cord clamping  Liveborn female infant (B) delivered in [place] breech presentation. APGARS 8/9. Cord immediately clamped and cut   Grossly normal uterus and ovaries. Left tube w/ paratubal cysts, largest 1.5cm, three others subcentimeter  Hysterotomy thin - closed in x1 layer w/ 1-0 PDS   - Intramyometrial Hemabate injected near focal area of bleeding with good effect   Fascia reapproximated w/ 0-0 Vicryl in a running fashion   SubQ reapproximated w/ 2-0 plain gut in a running fashion in x3 layers  Skin reapproximated w/ 3-0 Monocryl in a subcuticular fashion     1335/1700/425  - For 24hrs of Mg Postpartum     Dictation #:

## 2024-10-28 NOTE — OB RN DELIVERY SUMMARY - NS_SEPSISRSKCALC_OBGYN_ALL_OB_FT
No temperature has been documented for this patient in CPN or on the OB Flowsheet. Ensure the highest temperature during labor was documented on the OB Flowsheet.  No gestational age at birth has been documented. Ensure delivery date/time has been entered above.  Rupture of membranes must be entered above.   EOS calculated successfully. EOS Risk Factor: 0.5/1000 live births ([facility] national incidence); GA=34w;Temp=98.8; ROM=0; GBS='Negative'; Antibiotics='No antibiotics or any antibiotics < 2 hrs prior to birth'

## 2024-10-28 NOTE — OB NEONATOLOGY/PEDIATRICIAN DELIVERY SUMMARY - NSPEDSNEONOTESA_OBGYN_ALL_OB_FT
Requested to attend primary C/S for PEC and severe growth restriction of twin B of 34 week di di twins. Mother is a 24 year old  with prenatal labs neg, NR and immune, GBS neg, blood type O pos. Pregnancy complicated by PEC on Procardia and Magnesium, severe IUGR if twin B < 1 %, and breech presentation in twin A and B. Received BMZ on - and 10/24-10/25. AROM at delivery with clear fluid, infant emerged breech with good tone and cry. Delayed cord clamping x 1 minute. Routine resuscitation Apgars 8/9. Transferred to NICU in room air for prematurity.

## 2024-10-28 NOTE — PROGRESS NOTE ADULT - SUBJECTIVE AND OBJECTIVE BOX
R3 Antepartum Note, HD#30     Patient seen and examined at bedside, no acute overnight events. No acute complaints.   Pt reports +FM, denies LOF, VB, or ctx  Denies HA, epigastric pain, blurred vision, CP, SOB, or n/v    Vital Signs Last 24 Hours  T(C): 37 (10-28-24 @ 05:36), Max: 37 (10-28-24 @ 05:36)  HR: 109 (10-28-24 @ 05:36) (98 - 114)  BP: 123/83 (10-28-24 @ 05:36) (117/75 - 143/83)  RR: 18 (10-28-24 @ 05:36) (18 - 18)  SpO2: 97% (10-28-24 @ 05:36) (95% - 98%)    CAPILLARY BLOOD GLUCOSE          Physical Exam:  General: No acute distress. Resting comfortably  Pulm: Unlabored breathing. No respiratory distress   Abdomen: Soft. Non-tender. Gravid   Ext: No calf tenderness bilaterally        Labs:            Uric Acid: (10-26 @ 06:06)  3.6      Fibrinogen: (10-26 @ 06:06)  --       LDH: (10-26 @ 06:06)  163        MEDICATIONS  (STANDING):  lactated ringers. 1000 milliLiter(s) (50 mL/Hr) IV Continuous <Continuous>  lactated ringers. 1000 milliLiter(s) (125 mL/Hr) IV Continuous <Continuous>  NIFEdipine XL 30 milliGRAM(s) Oral every 24 hours  prenatal multivitamin 1 Tablet(s) Oral daily    MEDICATIONS  (PRN):  acetaminophen     Tablet .. 975 milliGRAM(s) Oral every 6 hours PRN Mild Pain (1 - 3), Moderate Pain (4 - 6)  metoclopramide 10 milliGRAM(s) Oral every 8 hours PRN headache  ondansetron Injectable 4 milliGRAM(s) IV Push every 8 hours PRN Nausea and/or Vomiting

## 2024-10-29 ENCOUNTER — APPOINTMENT (OUTPATIENT)
Dept: ANTEPARTUM | Facility: CLINIC | Age: 24
End: 2024-10-29

## 2024-10-29 LAB
ALBUMIN SERPL ELPH-MCNC: 3.2 G/DL — LOW (ref 3.3–5)
ALP SERPL-CCNC: 155 U/L — HIGH (ref 40–120)
ALT FLD-CCNC: 14 U/L — SIGNIFICANT CHANGE UP (ref 10–45)
ANION GAP SERPL CALC-SCNC: 12 MMOL/L — SIGNIFICANT CHANGE UP (ref 5–17)
AST SERPL-CCNC: 19 U/L — SIGNIFICANT CHANGE UP (ref 10–40)
BASOPHILS # BLD AUTO: 0.03 K/UL — SIGNIFICANT CHANGE UP (ref 0–0.2)
BASOPHILS NFR BLD AUTO: 0.2 % — SIGNIFICANT CHANGE UP (ref 0–2)
BILIRUB SERPL-MCNC: 0.2 MG/DL — SIGNIFICANT CHANGE UP (ref 0.2–1.2)
BUN SERPL-MCNC: 8 MG/DL — SIGNIFICANT CHANGE UP (ref 7–23)
CALCIUM SERPL-MCNC: 8 MG/DL — LOW (ref 8.4–10.5)
CHLORIDE SERPL-SCNC: 99 MMOL/L — SIGNIFICANT CHANGE UP (ref 96–108)
CO2 SERPL-SCNC: 21 MMOL/L — LOW (ref 22–31)
CREAT SERPL-MCNC: 0.43 MG/DL — LOW (ref 0.5–1.3)
EGFR: 137 ML/MIN/1.73M2 — SIGNIFICANT CHANGE UP
EOSINOPHIL # BLD AUTO: 0.04 K/UL — SIGNIFICANT CHANGE UP (ref 0–0.5)
EOSINOPHIL NFR BLD AUTO: 0.3 % — SIGNIFICANT CHANGE UP (ref 0–6)
GLUCOSE SERPL-MCNC: 128 MG/DL — HIGH (ref 70–99)
HCT VFR BLD CALC: 30.7 % — LOW (ref 34.5–45)
HGB BLD-MCNC: 10.2 G/DL — LOW (ref 11.5–15.5)
IMM GRANULOCYTES NFR BLD AUTO: 0.7 % — SIGNIFICANT CHANGE UP (ref 0–0.9)
LDH SERPL L TO P-CCNC: 167 U/L — SIGNIFICANT CHANGE UP (ref 50–242)
LYMPHOCYTES # BLD AUTO: 1.56 K/UL — SIGNIFICANT CHANGE UP (ref 1–3.3)
LYMPHOCYTES # BLD AUTO: 11.1 % — LOW (ref 13–44)
MAGNESIUM SERPL-MCNC: 5.1 MG/DL — HIGH (ref 1.6–2.6)
MCHC RBC-ENTMCNC: 27.8 PG — SIGNIFICANT CHANGE UP (ref 27–34)
MCHC RBC-ENTMCNC: 33.2 GM/DL — SIGNIFICANT CHANGE UP (ref 32–36)
MCV RBC AUTO: 83.7 FL — SIGNIFICANT CHANGE UP (ref 80–100)
MONOCYTES # BLD AUTO: 1.39 K/UL — HIGH (ref 0–0.9)
MONOCYTES NFR BLD AUTO: 9.9 % — SIGNIFICANT CHANGE UP (ref 2–14)
NEUTROPHILS # BLD AUTO: 10.94 K/UL — HIGH (ref 1.8–7.4)
NEUTROPHILS NFR BLD AUTO: 77.8 % — HIGH (ref 43–77)
NRBC # BLD: 0 /100 WBCS — SIGNIFICANT CHANGE UP (ref 0–0)
PLATELET # BLD AUTO: 252 K/UL — SIGNIFICANT CHANGE UP (ref 150–400)
POTASSIUM SERPL-MCNC: 3.9 MMOL/L — SIGNIFICANT CHANGE UP (ref 3.5–5.3)
POTASSIUM SERPL-SCNC: 3.9 MMOL/L — SIGNIFICANT CHANGE UP (ref 3.5–5.3)
PROT SERPL-MCNC: 5.7 G/DL — LOW (ref 6–8.3)
RBC # BLD: 3.67 M/UL — LOW (ref 3.8–5.2)
RBC # FLD: 14.1 % — SIGNIFICANT CHANGE UP (ref 10.3–14.5)
SODIUM SERPL-SCNC: 132 MMOL/L — LOW (ref 135–145)
URATE SERPL-MCNC: 4.4 MG/DL — SIGNIFICANT CHANGE UP (ref 2.5–7)
WBC # BLD: 14.06 K/UL — HIGH (ref 3.8–10.5)
WBC # FLD AUTO: 14.06 K/UL — HIGH (ref 3.8–10.5)

## 2024-10-29 RX ORDER — IBUPROFEN 200 MG
600 TABLET ORAL EVERY 6 HOURS
Refills: 0 | Status: COMPLETED | OUTPATIENT
Start: 2024-10-29 | End: 2025-09-27

## 2024-10-29 RX ORDER — IBUPROFEN 200 MG
600 TABLET ORAL EVERY 6 HOURS
Refills: 0 | Status: DISCONTINUED | OUTPATIENT
Start: 2024-10-29 | End: 2024-11-01

## 2024-10-29 RX ADMIN — Medication 1 TABLET(S): at 12:26

## 2024-10-29 RX ADMIN — Medication 975 MILLIGRAM(S): at 04:04

## 2024-10-29 RX ADMIN — Medication 50 MILLILITER(S): at 06:05

## 2024-10-29 RX ADMIN — Medication 975 MILLIGRAM(S): at 17:10

## 2024-10-29 RX ADMIN — HEPARIN SODIUM 5000 UNIT(S): 10000 INJECTION INTRAVENOUS; SUBCUTANEOUS at 20:39

## 2024-10-29 RX ADMIN — Medication 975 MILLIGRAM(S): at 09:40

## 2024-10-29 RX ADMIN — Medication 975 MILLIGRAM(S): at 09:04

## 2024-10-29 RX ADMIN — Medication 975 MILLIGRAM(S): at 03:34

## 2024-10-29 RX ADMIN — HEPARIN SODIUM 5000 UNIT(S): 10000 INJECTION INTRAVENOUS; SUBCUTANEOUS at 09:04

## 2024-10-29 RX ADMIN — Medication 600 MILLIGRAM(S): at 23:36

## 2024-10-29 RX ADMIN — KETOROLAC TROMETHAMINE 30 MILLIGRAM(S): 30 INJECTION INTRAMUSCULAR; INTRAVENOUS at 06:36

## 2024-10-29 RX ADMIN — KETOROLAC TROMETHAMINE 30 MILLIGRAM(S): 30 INJECTION INTRAMUSCULAR; INTRAVENOUS at 00:16

## 2024-10-29 RX ADMIN — Medication 30 MILLIGRAM(S): at 06:07

## 2024-10-29 RX ADMIN — KETOROLAC TROMETHAMINE 30 MILLIGRAM(S): 30 INJECTION INTRAMUSCULAR; INTRAVENOUS at 12:26

## 2024-10-29 RX ADMIN — KETOROLAC TROMETHAMINE 30 MILLIGRAM(S): 30 INJECTION INTRAMUSCULAR; INTRAVENOUS at 13:49

## 2024-10-29 RX ADMIN — KETOROLAC TROMETHAMINE 30 MILLIGRAM(S): 30 INJECTION INTRAMUSCULAR; INTRAVENOUS at 06:06

## 2024-10-29 RX ADMIN — Medication 975 MILLIGRAM(S): at 20:39

## 2024-10-29 RX ADMIN — Medication 50 GM/HR: at 06:05

## 2024-10-29 RX ADMIN — Medication 975 MILLIGRAM(S): at 16:27

## 2024-10-29 RX ADMIN — Medication 975 MILLIGRAM(S): at 21:09

## 2024-10-29 NOTE — PROGRESS NOTE ADULT - SUBJECTIVE AND OBJECTIVE BOX
OB Progress Note:  Delivery, POD#1    S: 25yo POD#1 s/p LTCS . Her pain is well controlled. She is tolerating a regular diet. Not yet passing flatus. Denies N/V. Denies CP/SOB/lightheadedness/dizziness. Pt denies sxs of PEC: denies headache, visual changes, RUQ pain, respiratory distress      O:   Vital Signs Last 24 Hrs  T(C): 36.8 (29 Oct 2024 06:10), Max: 36.8 (28 Oct 2024 07:44)  T(F): 98.3 (29 Oct 2024 06:10), Max: 98.3 (29 Oct 2024 00:05)  HR: 93 (29 Oct 2024 06:10) (88 - 136)  BP: 111/66 (29 Oct 2024 06:10) (98/61 - 135/86)  BP(mean): 84 (28 Oct 2024 14:00) (84 - 95)  RR: 18 (29 Oct 2024 06:10) (18 - 18)  SpO2: 98% (29 Oct 2024 06:10) (96% - 99%)    Parameters below as of 29 Oct 2024 06:10  Patient On (Oxygen Delivery Method): room air        Labs:  Blood type: O Positive  Rubella IgG: RPR:                           10.2[L]   14.06[H] >-----------< 252    ( 10-29 @ 04:20 )             30.7[L]                        12.2   10.88[H] >-----------< 237    ( 10-28 @ 07:13 )             37.4    10-29-24 @ 04:20      132[L]  |  99  |  8   ----------------------------<  128[H]  3.9   |  21[L]  |  0.43[L]    10-28-24 @ 07:14      136  |  103  |  7   ----------------------------<  82  3.7   |  19[L]  |  0.36[L]        Ca    8.0[L]      29 Oct 2024 04:20  Ca    9.2      28 Oct 2024 07:14  Mg     5.1[H]     10-29  Mg     4.8[H]     10-28  Mg     4.4[H]     10-28    TPro  5.7[L]  /  Alb  3.2[L]  /  TBili  0.2  /  DBili  x   /  AST  19  /  ALT  14  /  AlkPhos  155[H]  10-29-24 @ 04:20  TPro  6.5  /  Alb  3.4  /  TBili  0.2  /  DBili  x   /  AST  14  /  ALT  9[L]  /  AlkPhos  192[H]  10-28-24 @ 07:14          PE:  General: NAD  Abdomen: Mildly distended, appropriately tender, incision c/d/i.  Extremities: No erythema, no pitting edema

## 2024-10-29 NOTE — PROGRESS NOTE ADULT - ASSESSMENT
23yo POD#1 from pLTCS for di/di TIUP @34wk for sPEC, QBL 1335. Patient is clinically stable and recovering well postoperatively.    #sPEC  - Continue MgSO4 for seizure ppx  - Continue Procardia 30XL   - Denies sxs sPEC  - AM HELLP labs  - Patient to follow up with cardio OB outpatient    #Postpartum  - DTV  - HSQ for DVT ppx  - Encourage ambulation  - Tylenol, Motrin, Oxy for pain control    Danae Roca, PGY3

## 2024-10-29 NOTE — PROGRESS NOTE ADULT - SUBJECTIVE AND OBJECTIVE BOX
Day 1 of Anesthesia Pain Management Service    SUBJECTIVE: Doing ok  Pain Scale Score:          [X] Refer to charted pain scores    THERAPY:    s/p    100 mcg PF morphine on 10\28\24      MEDICATIONS  (STANDING):  acetaminophen     Tablet .. 975 milliGRAM(s) Oral <User Schedule>  heparin   Injectable 5000 Unit(s) SubCutaneous every 12 hours  ketorolac   Injectable 30 milliGRAM(s) IV Push every 6 hours  lactated ringers. 1000 milliLiter(s) (125 mL/Hr) IV Continuous <Continuous>  lactated ringers. 1000 milliLiter(s) (50 mL/Hr) IV Continuous <Continuous>  magnesium sulfate Infusion 2 Gm/Hr (50 mL/Hr) IV Continuous <Continuous>  morphine PF Spinal 0.1 milliGRAM(s) IntraThecal. once  NIFEdipine XL 30 milliGRAM(s) Oral every 24 hours  prenatal multivitamin 1 Tablet(s) Oral daily    MEDICATIONS  (PRN):  acetaminophen     Tablet .. 975 milliGRAM(s) Oral every 6 hours PRN Mild Pain (1 - 3), Moderate Pain (4 - 6)  dexAMETHasone  Injectable 4 milliGRAM(s) IV Push every 6 hours PRN Nausea  metoclopramide 10 milliGRAM(s) Oral every 8 hours PRN headache  nalbuphine Injectable 2.5 milliGRAM(s) IV Push every 6 hours PRN Pruritus  naloxone Injectable 0.1 milliGRAM(s) IV Push every 3 minutes PRN For ANY of the following changes in patient status:  A. Breaths Per Minute LESS THAN 10, B. Oxygen saturation LESS THAN 90%, C. Sedation score of 6 for Stop After: 4 Times  ondansetron Injectable 4 milliGRAM(s) IV Push every 6 hours PRN Nausea  ondansetron Injectable 4 milliGRAM(s) IV Push every 8 hours PRN Nausea and/or Vomiting  oxyCODONE    IR 5 milliGRAM(s) Oral every 3 hours PRN Mild Pain (1 - 3)      OBJECTIVE:    Sedation:        	[X] Alert	 [ ] Drowsy	[ ] Arousable      [ ] Asleep       [ ] Unresponsive    Side Effects:	[X] None 	[ ] Nausea	[ ] Vomiting         [ ] Pruritus  		[ ] Weakness            [ ] Numbness	          [ ] Other:    Vital Signs Last 24 Hrs  T(C): 36.8 (29 Oct 2024 08:19), Max: 36.8 (28 Oct 2024 14:00)  T(F): 98.2 (29 Oct 2024 08:19), Max: 98.3 (29 Oct 2024 00:05)  HR: 98 (29 Oct 2024 08:19) (88 - 108)  BP: 134/79 (29 Oct 2024 08:19) (98/61 - 134/79)  BP(mean): 84 (28 Oct 2024 14:00) (84 - 95)  RR: 18 (29 Oct 2024 08:19) (18 - 18)  SpO2: 110% (29 Oct 2024 08:19) (96% - 110%)    Parameters below as of 29 Oct 2024 08:19  Patient On (Oxygen Delivery Method): room air        ASSESSMENT/ PLAN  [X] Patient transitioned to prn analgesics  [X] Pain management per primary service, pain service to sign off   [X]Documentation and Verification of current medications

## 2024-10-30 RX ORDER — ALBUTEROL 90 MCG
2 AEROSOL (GRAM) INHALATION EVERY 6 HOURS
Refills: 0 | Status: DISCONTINUED | OUTPATIENT
Start: 2024-10-30 | End: 2024-11-01

## 2024-10-30 RX ADMIN — Medication 600 MILLIGRAM(S): at 00:06

## 2024-10-30 RX ADMIN — Medication 975 MILLIGRAM(S): at 09:05

## 2024-10-30 RX ADMIN — Medication 2 PUFF(S): at 20:58

## 2024-10-30 RX ADMIN — Medication 975 MILLIGRAM(S): at 09:50

## 2024-10-30 RX ADMIN — Medication 975 MILLIGRAM(S): at 03:10

## 2024-10-30 RX ADMIN — HEPARIN SODIUM 5000 UNIT(S): 10000 INJECTION INTRAVENOUS; SUBCUTANEOUS at 09:04

## 2024-10-30 RX ADMIN — Medication 600 MILLIGRAM(S): at 23:54

## 2024-10-30 RX ADMIN — Medication 30 MILLIGRAM(S): at 06:11

## 2024-10-30 RX ADMIN — Medication 600 MILLIGRAM(S): at 13:09

## 2024-10-30 RX ADMIN — Medication 975 MILLIGRAM(S): at 21:20

## 2024-10-30 RX ADMIN — Medication 600 MILLIGRAM(S): at 06:11

## 2024-10-30 RX ADMIN — HEPARIN SODIUM 5000 UNIT(S): 10000 INJECTION INTRAVENOUS; SUBCUTANEOUS at 20:36

## 2024-10-30 RX ADMIN — Medication 600 MILLIGRAM(S): at 06:41

## 2024-10-30 RX ADMIN — Medication 1 TABLET(S): at 13:09

## 2024-10-30 RX ADMIN — Medication 600 MILLIGRAM(S): at 19:07

## 2024-10-30 RX ADMIN — Medication 975 MILLIGRAM(S): at 02:39

## 2024-10-30 RX ADMIN — Medication 975 MILLIGRAM(S): at 20:36

## 2024-10-30 NOTE — PROVIDER CONTACT NOTE (OTHER) - ACTION/TREATMENT ORDERED:
Dr. Roca to assess pt at bedside. Pt instructed to use incentive spirometer.
MD aware, will watch monitor for contractions.

## 2024-10-30 NOTE — PROVIDER CONTACT NOTE (OTHER) - ASSESSMENT
Pt comfortable in bed at this time, but reports the first cramp she felt was very painful and took her breath away. Unsure how many she has felt or how often. Denies LOF/VB.
Pt coughing. Lung sounds are abnormal. Heart rate 111, other VS stable.

## 2024-10-30 NOTE — PROGRESS NOTE ADULT - ASSESSMENT
23yo POD#2 from pLTCS for di/di TIUP @34wk for sPEC, QBL 1335. Patient is clinically stable and recovering well postoperatively.    #sPEC  - Continue MgSO4 for seizure ppx  - Continue Procardia 30XL   - Denies sxs sPEC  - Patient to follow up with cardio OB outpatient    #Postpartum  - HSQ for DVT ppx  - Encourage ambulation  - Tylenol, Motrin, Oxy for pain control    Danae Roca, PGY3

## 2024-10-30 NOTE — PROGRESS NOTE ADULT - SUBJECTIVE AND OBJECTIVE BOX
OB Progress Note: pLTCS, POD#2    S: 25yo POD#2 s/p pLTCS. Pain is well controlled. She is tolerating a regular diet and passing flatus. She is voiding spontaneously, and ambulating without difficulty. Denies CP/SOB. Denies lightheadedness/dizziness. Denies N/V. Denies sxs od PEC: denies headache, visual changes, RUQ pain, respiratory distress    O:  Vitals:  Vital Signs Last 24 Hrs  T(C): 37.3 (30 Oct 2024 00:04), Max: 37.3 (30 Oct 2024 00:04)  T(F): 99.1 (30 Oct 2024 00:04), Max: 99.1 (30 Oct 2024 00:04)  HR: 101 (30 Oct 2024 00:04) (93 - 101)  BP: 131/80 (30 Oct 2024 00:04) (111/66 - 134/79)  BP(mean): --  RR: 18 (30 Oct 2024 00:04) (18 - 18)  SpO2: 98% (30 Oct 2024 00:04) (97% - 98%)    Parameters below as of 30 Oct 2024 00:04  Patient On (Oxygen Delivery Method): room air        MEDICATIONS  (STANDING):  acetaminophen     Tablet .. 975 milliGRAM(s) Oral <User Schedule>  heparin   Injectable 5000 Unit(s) SubCutaneous every 12 hours  ibuprofen  Tablet. 600 milliGRAM(s) Oral every 6 hours  lactated ringers. 1000 milliLiter(s) (125 mL/Hr) IV Continuous <Continuous>  lactated ringers. 1000 milliLiter(s) (50 mL/Hr) IV Continuous <Continuous>  magnesium sulfate Infusion 2 Gm/Hr (50 mL/Hr) IV Continuous <Continuous>  NIFEdipine XL 30 milliGRAM(s) Oral every 24 hours  prenatal multivitamin 1 Tablet(s) Oral daily      MEDICATIONS  (PRN):  acetaminophen     Tablet .. 975 milliGRAM(s) Oral every 6 hours PRN Mild Pain (1 - 3), Moderate Pain (4 - 6)  metoclopramide 10 milliGRAM(s) Oral every 8 hours PRN headache  ondansetron Injectable 4 milliGRAM(s) IV Push every 8 hours PRN Nausea and/or Vomiting      Labs:  Blood type: O Positive  Rubella IgG: RPR:                           10.2[L]   14.06[H] >-----------< 252    ( 10-29 @ 04:20 )             30.7[L]                        12.2   10.88[H] >-----------< 237    ( 10-28 @ 07:13 )             37.4    10-29-24 @ 04:20      132[L]  |  99  |  8   ----------------------------<  128[H]  3.9   |  21[L]  |  0.43[L]    10-28-24 @ 07:14      136  |  103  |  7   ----------------------------<  82  3.7   |  19[L]  |  0.36[L]        Ca    8.0[L]      29 Oct 2024 04:20  Ca    9.2      28 Oct 2024 07:14  Mg     5.1[H]     10-29  Mg     4.8[H]     10-28  Mg     4.4[H]     10-28    TPro  5.7[L]  /  Alb  3.2[L]  /  TBili  0.2  /  DBili  x   /  AST  19  /  ALT  14  /  AlkPhos  155[H]  10-29-24 @ 04:20  TPro  6.5  /  Alb  3.4  /  TBili  0.2  /  DBili  x   /  AST  14  /  ALT  9[L]  /  AlkPhos  192[H]  10-28-24 @ 07:14          PE:  General: NAD  Abdomen: Soft, appropriately tender, incision c/d/i.  Extremities: No erythema, no pitting edema

## 2024-10-31 RX ADMIN — HEPARIN SODIUM 5000 UNIT(S): 10000 INJECTION INTRAVENOUS; SUBCUTANEOUS at 09:26

## 2024-10-31 RX ADMIN — Medication 975 MILLIGRAM(S): at 10:15

## 2024-10-31 RX ADMIN — Medication 975 MILLIGRAM(S): at 21:07

## 2024-10-31 RX ADMIN — Medication 30 MILLIGRAM(S): at 05:36

## 2024-10-31 RX ADMIN — Medication 600 MILLIGRAM(S): at 00:40

## 2024-10-31 RX ADMIN — Medication 600 MILLIGRAM(S): at 06:17

## 2024-10-31 RX ADMIN — HEPARIN SODIUM 5000 UNIT(S): 10000 INJECTION INTRAVENOUS; SUBCUTANEOUS at 21:09

## 2024-10-31 RX ADMIN — Medication 600 MILLIGRAM(S): at 18:45

## 2024-10-31 RX ADMIN — Medication 600 MILLIGRAM(S): at 12:46

## 2024-10-31 RX ADMIN — Medication 1 TABLET(S): at 12:47

## 2024-10-31 RX ADMIN — Medication 975 MILLIGRAM(S): at 09:26

## 2024-10-31 RX ADMIN — Medication 600 MILLIGRAM(S): at 19:30

## 2024-10-31 RX ADMIN — Medication 600 MILLIGRAM(S): at 13:30

## 2024-10-31 RX ADMIN — Medication 600 MILLIGRAM(S): at 05:35

## 2024-10-31 NOTE — PROGRESS NOTE ADULT - ASSESSMENT
25yo POD#3 from pLTCS for di/di TIUP @34wk for sPEC, QBL 1335. Overnight patient complaining of shortness of breath with wheezing on exam, likely asthma exacerbation now s/p albuterol     #sPEC  - Continue MgSO4 for seizure ppx  - Continue Procardia 30XL  - Denies sxs sPEC  - Patient to follow up with cardio OB outpatient    #Asthma  -Albuterol q6h PRN    #Postpartum  - HSQ for DVT ppx  - Encourage ambulation  - Tylenol, Motrin, Oxy for pain control    Danae Roca, PGY3

## 2024-10-31 NOTE — PROGRESS NOTE ADULT - SUBJECTIVE AND OBJECTIVE BOX
OB Postpartum Note:  Delivery, POD#3    S: 25yo POD#3 s/p pLTCS. The patient feels well.  Pain is well controlled. She is tolerating a regular diet and passing flatus. She is voiding spontaneously, and ambulating without difficulty. Denies CP/SOB. Denies lightheadedness/dizziness. Denies N/V. Denies sxs of PEC: no headaches, visual changes, RUQ pain, and respiratory distress    O:  Vitals:  Vital Signs Last 24 Hrs  T(C): 36.7 (31 Oct 2024 00:43), Max: 36.9 (30 Oct 2024 05:39)  T(F): 98 (31 Oct 2024 00:43), Max: 98.4 (30 Oct 2024 05:39)  HR: 109 (31 Oct 2024 00:43) (102 - 111)  BP: 112/72 (31 Oct 2024 00:43) (100/65 - 129/86)  BP(mean): --  RR: 18 (31 Oct 2024 00:43) (18 - 18)  SpO2: 95% (31 Oct 2024 00:43) (95% - 99%)    Parameters below as of 30 Oct 2024 20:43  Patient On (Oxygen Delivery Method): room air        MEDICATIONS  (STANDING):  acetaminophen     Tablet .. 975 milliGRAM(s) Oral <User Schedule>  heparin   Injectable 5000 Unit(s) SubCutaneous every 12 hours  ibuprofen  Tablet. 600 milliGRAM(s) Oral every 6 hours  lactated ringers. 1000 milliLiter(s) (50 mL/Hr) IV Continuous <Continuous>  lactated ringers. 1000 milliLiter(s) (125 mL/Hr) IV Continuous <Continuous>  magnesium sulfate Infusion 2 Gm/Hr (50 mL/Hr) IV Continuous <Continuous>  NIFEdipine XL 30 milliGRAM(s) Oral every 24 hours  prenatal multivitamin 1 Tablet(s) Oral daily    MEDICATIONS  (PRN):  acetaminophen     Tablet .. 975 milliGRAM(s) Oral every 6 hours PRN Mild Pain (1 - 3), Moderate Pain (4 - 6)  albuterol    90 MICROgram(s) HFA Inhaler 2 Puff(s) Inhalation every 6 hours PRN Shortness of Breath and/or Wheezing  metoclopramide 10 milliGRAM(s) Oral every 8 hours PRN headache  ondansetron Injectable 4 milliGRAM(s) IV Push every 8 hours PRN Nausea and/or Vomiting      LABS:  Blood type: O Positive  Rubella IgG: RPR:                           10.2[L]   14.06[H] >-----------< 252    ( 10-29 @ 04:20 )             30.7[L]                        12.2   10.88[H] >-----------< 237    ( 10-28 @ 07:13 )             37.4    10-29-24 @ 04:20      132[L]  |  99  |  8   ----------------------------<  128[H]  3.9   |  21[L]  |  0.43[L]    10-28-24 @ 07:14      136  |  103  |  7   ----------------------------<  82  3.7   |  19[L]  |  0.36[L]        Ca    8.0[L]      29 Oct 2024 04:20  Ca    9.2      28 Oct 2024 07:14  Mg     5.1[H]     10-29  Mg     4.8[H]     10-28  Mg     4.4[H]     10-28    TPro  5.7[L]  /  Alb  3.2[L]  /  TBili  0.2  /  DBili  x   /  AST  19  /  ALT  14  /  AlkPhos  155[H]  10-29-24 @ 04:20  TPro  6.5  /  Alb  3.4  /  TBili  0.2  /  DBili  x   /  AST  14  /  ALT  9[L]  /  AlkPhos  192[H]  10-28-24 @ 07:14          Physical exam:  Gen: NAD  Abdomen: Soft, nontender, no distension , firm uterine fundus at umbilicus.  Incision: Clean, dry, and intact   Pelvic: Normal lochia noted  Ext: No calf tenderness

## 2024-11-01 ENCOUNTER — APPOINTMENT (OUTPATIENT)
Dept: ANTEPARTUM | Facility: CLINIC | Age: 24
End: 2024-11-01

## 2024-11-01 VITALS
RESPIRATION RATE: 18 BRPM | DIASTOLIC BLOOD PRESSURE: 80 MMHG | HEART RATE: 111 BPM | OXYGEN SATURATION: 95 % | SYSTOLIC BLOOD PRESSURE: 126 MMHG | TEMPERATURE: 99 F

## 2024-11-01 LAB — SURGICAL PATHOLOGY STUDY: SIGNIFICANT CHANGE UP

## 2024-11-01 PROCEDURE — 83615 LACTATE (LD) (LDH) ENZYME: CPT

## 2024-11-01 PROCEDURE — 94640 AIRWAY INHALATION TREATMENT: CPT

## 2024-11-01 PROCEDURE — 88307 TISSUE EXAM BY PATHOLOGIST: CPT

## 2024-11-01 PROCEDURE — 36415 COLL VENOUS BLD VENIPUNCTURE: CPT

## 2024-11-01 PROCEDURE — 83735 ASSAY OF MAGNESIUM: CPT

## 2024-11-01 PROCEDURE — 76818 FETAL BIOPHYS PROFILE W/NST: CPT

## 2024-11-01 PROCEDURE — 87653 STREP B DNA AMP PROBE: CPT

## 2024-11-01 PROCEDURE — 59025 FETAL NON-STRESS TEST: CPT

## 2024-11-01 PROCEDURE — 86901 BLOOD TYPING SEROLOGIC RH(D): CPT

## 2024-11-01 PROCEDURE — 84550 ASSAY OF BLOOD/URIC ACID: CPT

## 2024-11-01 PROCEDURE — 59050 FETAL MONITOR W/REPORT: CPT

## 2024-11-01 PROCEDURE — 76820 UMBILICAL ARTERY ECHO: CPT

## 2024-11-01 PROCEDURE — 80053 COMPREHEN METABOLIC PANEL: CPT

## 2024-11-01 PROCEDURE — 85025 COMPLETE CBC W/AUTO DIFF WBC: CPT

## 2024-11-01 PROCEDURE — 86900 BLOOD TYPING SEROLOGIC ABO: CPT

## 2024-11-01 PROCEDURE — 86850 RBC ANTIBODY SCREEN: CPT

## 2024-11-01 PROCEDURE — 90715 TDAP VACCINE 7 YRS/> IM: CPT

## 2024-11-01 RX ORDER — NIFEDIPINE 90 MG
1 TABLET, EXTENDED RELEASE 24 HR ORAL
Qty: 30 | Refills: 3
Start: 2024-11-01

## 2024-11-01 RX ORDER — IBUPROFEN 200 MG
1 TABLET ORAL
Qty: 0 | Refills: 0 | DISCHARGE
Start: 2024-11-01

## 2024-11-01 RX ORDER — ASPIRIN/MAG CARB/ALUMINUM AMIN 325 MG
1 TABLET ORAL
Refills: 0 | DISCHARGE

## 2024-11-01 RX ORDER — ACETAMINOPHEN 500 MG
3 TABLET ORAL
Qty: 0 | Refills: 0 | DISCHARGE
Start: 2024-11-01

## 2024-11-01 RX ADMIN — Medication 30 MILLIGRAM(S): at 05:45

## 2024-11-01 NOTE — PROGRESS NOTE ADULT - ATTENDING COMMENTS
MFM Attending     Pt evaluated and counseled on MFM rounds today.  Agree with the assessment and plan above.    In summary, Ms. Martinez is a 23yo G1 @ 31 5/7 weeks HD 14 s/p admission for preeclampsia with severe features by BP criteria.  This is a dichorionic twin gestation complicated by severe fetal growth restriction of twin B likely due to placental insufficiency with abnormal umbilical artery Doppler studies, most recently elevated S/Ds.      She is asymptomatic at this time.  Her BPs are controlled on nifedipine 30mg XL.  She completed a course of  corticosteroids on 10/1.  The FHRTs are reactive today.    Her plan is for admission until delivery.  Continue close BP monitoring and nifedipine 30mg XL daily.  HELLP labs every three days, next on 10/14.  Fetal monitoring with twice daily NSTs and twice weekly BPP/Dopplers.  Next growth on/after 10/18.  MOD:  for malpresentation twin B, at 34 weeks unless indicated earlier.  For magnesium at delivery and 24hrs postpartum.
Obstetrical  8am-6pm:  Patient seen and examined by me. Agree with above resident note. Incision clean, dry and intact. (+) flatus  Carolina BONNER
Patient seen and evaluated on Rounds with the resident. Agree with the documentation as above.     In brief, this is a 24y  at 30w0d, transferred from [facility], admitted for preeclampsia with severe features and severe FGR of baby B (EFW 701g <1%). Pt is s/p Labetalol 20 IVP x1 at [facility], and was initiated on Procardia 30XL. She is s/p Mg for seizure prophylaxis. Receiving betamethasone (-). S/p NICU consult. Pt feels well this morning, without signs or symptoms of preeclampsia. BPs have been normotensive to mild range. For repeat HELLP labs today. Reports good fetal movement x2, denies ctx, VB or LOF. Reassuring maternal and fetal status x2.     For ultrasound today. NST BID. Receiving Heparin SQ for DVT prophylaxis.     Юлия Frias MD  MFM Attending
MFM Attending     Pt evaluated and counseled on MFM rounds today.  Agree with the assessment and plan above.    In summary, Ms. Martinez is a 23yo G1 @ 31 2/7 weeks HD 11 s/p admission for preeclampsia with severe features by BP criteria.  This is a dichorionic twin gestation complicated by severe fetal growth restriction of twin B likely due to placental insufficiency with abnormal umbilical artery Doppler studies, most recently elevated S/Ds.      She is asymptomatic - intermittent mild headaches always resolve with acetaminophen/sleep.  Her BPs are controlled on nifedipine 30mg XL.  She completed a course of  corticosteroids on 10/1.      Her plan is for admission until delivery.  Continue close BP monitoring and nifedipine 30mg XL daily.  HELLP labs every three days, next on 10/11.  Fetal monitoring with twice daily NSTs and twice weekly BPP/Dopplers.  Next growth on/after 10/18.  MOD:  for malpresentation twin B, at 34 weeks unless indicated earlier.
MFM Attending     Pt evaluated and counseled on MFM rounds today.  Agree with the assessment and plan above.    In summary, Ms. Martinez is a 23yo G1 @ 31 6/7 weeks HD 15 s/p admission for preeclampsia with severe features by BP criteria.  This is a dichorionic twin gestation complicated by severe fetal growth restriction of twin B likely due to placental insufficiency with abnormal umbilical artery Doppler studies, most recently elevated S/Ds.      She is asymptomatic at this time.  Her BPs are controlled on nifedipine 30mg XL.  She completed a course of  corticosteroids on 10/1.  The FHRTs are reactive today.    Her plan is for admission until delivery.  Continue close BP monitoring and nifedipine 30mg XL daily.  HELLP labs every three days, next on 10/14.  Fetal monitoring with twice daily NSTs and twice weekly BPP/Dopplers.  Next growth on/after 10/18.  MOD:  for malpresentation twin B, at 34 weeks unless indicated earlier.  For magnesium at delivery and 24hrs postpartum
MFM Attending     Pt evaluated and counseled on MFM rounds today.  Agree with the assessment and plan above.    In summary, Ms. Martinez is a 25yo G1 @ 31 1/7 weeks HD 10 s/p admission for preeclampsia with severe features by BP criteria.  This is a dichorionic twin gestation complicated by severe fetal growth restriction of twin B likely due to placental insufficiency with abnormal umbilical artery Doppler studies, most recently elevated S/Ds.  She is asymptomatic - a mild headache this morning responded to acetaminophen and sleep.  Her BPs are controlled on nifedipine 30mg XL.  She completed a course of  corticosteroids on 10/1.      Her plan is for admission until delivery.  Continue close BP monitoring and nifedipine 30mg XL daily.  HELLP labs every three days, next on 10/8.  Fetal monitoring with twice daily NSTs and twice weekly BPP/Dopplers.  Next growth on/after 10/18.  MOD:  for malpresentation twin B, at 34 weeks unless indicated earlier.
MFM Attending Addendum    The patient was seen and evaluated on AM rounds with the MFM Team - PA Ms. Loredo.  I agree with the assessment and plan as listed.    Di/Di twins at 32 2/7 weeks, admitted just over 2 weeks ago as a transfer from [facility] for PEC with severe features based on BP criteria, FGR of twin B.  She is s/p course of ACS, and BPs stable on Procardia 30XL once daily, with normal and stable HELLP labs.  Patient is asymptomatic and feels well.  Agree with inpatient management until delivery via  (breech x 2, FGR of twin B) at 34 weeks (scheduled for 10/28/24); would deliver earlier than that if: severe range BPs not amenable to medication, HELLP, NRFHT, or persistent symptoms of severe disease.  MFM to follow.    SUN Samaniego MD
MFM Attending Addendum    The patient was seen and evaluated on AM rounds with the MFM Team, PA Ms. Loredo, Resident Dr. Bolden, and Fellow Dr. Bear.  H&P was reviewed and appreciated, and I agree with the assessment and plan as listed.    Di/Di twins at 32 0/7 weeks, admitted just over 2 weeks ago as a transfer from [facility] for PEC with severe features based on BP criteria, FGR of twin B.  She is s/p course of ACS, and BPs stable on Procardia 30XL once daily, with normal and stable HELLP labs.  Patient is asymptomatic and feels well.  Agree with inpatient management until delivery via  (breech x 2, FGR of twin B) at 34 weeks; would deliver earlier than that if: severe range BPs not amenable to medication, HELLP, NRFHT, or persistent symptoms of severe disease.  Confirming we have prenatals from [facility] provider.  MFM to follow.    SUN Samaniego MD
MFM Attending Addendum    The patient was seen and evaluated on AM rounds with the MFM Team.  I agree with the assessment and plan as listed.    Di/Di twins at 32 3/7 weeks, admitted just over 2 weeks ago as a transfer from [facility] for PEC with severe features based on BP and proteinuria criteria, FGR of twin B.  She is s/p course of ACS, and BPs stable on Procardia 30XL once daily, with normal and stable HELLP labs.  Patient is asymptomatic and feels well.  Agree with inpatient management until delivery via  (breech x 2, FGR of twin B) at 34 weeks (scheduled for 10/28/24); would deliver earlier than that if: severe range BPs not amenable to medication, HELLP, NRFHT, or persistent symptoms of severe disease.  MFM to follow.    SUN Samaniego MD
MFM Attending Addendum    The patient was seen and evaluated on AM rounds with the MFM Team.  I agree with the assessment and plan as listed.    Di/Di twins at 32 5/7 weeks, admitted 3 weeks ago as a transfer from [facility] for PEC with severe features based on BP and proteinuria criteria, FGR of twin B.  She is s/p 1 course of ACS, and BPs stable on Procardia 30XL once daily (only one mild range overnight), with normal and stable HELLP labs.  Patient is asymptomatic and feels well.  Agree with inpatient management until delivery via  (breech x 2, FGR of twin B) at 34 weeks (scheduled for 10/28/24 - will consider rescue course of ACS leading up to delivery); would deliver earlier than that if: severe range BPs not amenable to medication, HELLP, NRFHT, or persistent symptoms of severe disease.  MFM to follow.    SUN Samaniego MD
MFM Attending Addendum  The patient was seen and evaluated on AM rounds with r3 Dr. Bolden, mfm fellow Dr. Bear, and PA Jaleesa Loredo.    Di/Di twins at 33 3/7 weeks transferred from [facility] for PEC with severe features based on BP and proteinuria criteria plus FGR of twin B.    - s/p 1 course of BMZ; will give rescue course starting today  - serial BPs for goal < 160 systolic and < 110 diastolic; stable on Procardia 30XL once daily, with normal and stable HELLP labs.    - Inpatient management until delivery via  (breech x 2, FGR of twin B) at 34 weeks (scheduled for 10/28/24)  - Delivery < 34 weeks if: severe range BPs not improving with medication, HELLP, NRFHT, poor fetal growth, or persistent symptoms of severe disease.    - Given association of early onset severe PEC + FGR with future cardiovascular disease, will recommend CardioObstetrics consultation and follow up.  - If delivery indicated < 34 weeks, evaluate for APLS  - Appreciate NICU consult    Deyanira Nguyen
Obstetrical  8am-6pm:  Patient seen and examined by me. Agree with above resident note. Incision clean, dry and intact.  Carolina BONNER
Obstetrical  8am-6pm:  Patient seen and examined by me. Agree with above resident note. Incision clean, dry and intact.  Carolina BONNER
Obstetrical  8am-6pm:  Patient seen and examined by me. Agree with above resident note. Incision clean, dry and intact. (+) flatus. Feeling well per patient.  Carolina BONNER
Patient seen and evaluated on Rounds. Agree with the documentation as above.     In brief, this is a 24y  at 30w5d, transferred from [facility], admitted for preeclampsia with severe features and severe FGR of baby B (EFW 888g <1%). Pt is s/p Labetalol 20 IVP x1 at [facility], and receiving Procardia 30XL daily. S/p Mg for seizure prophylaxis (-). S/p betamethasone (-) and s/p NICU consult. Pt asymptomatic this morning, without signs or symptoms of preeclampsia. BPs have been normotensive to mild range, 120-144/80-91 today. Most recent HELLP labs WNL, next set in 3 days or sooner if there is a change in clinical status. Reports good fetal movement x2, denies ctx, VB or LOF. Reassuring maternal and fetal status x2.     Most recent BPP/growth with intermittently elevated umbilical artery Dopplers for Baby B and suboptimal growth. Will continue to reassess BPP 2x week. NST BID.    Юлия Frias MD  MFM Attending
Patient seen and evaluated on Rounds. Agree with the documentation as above.     In brief, this is a 24y  at 30w6d, transferred from [facility], admitted for preeclampsia with severe features and severe FGR of baby B (EFW 888g <1%). Pt is s/p Labetalol 20 IVP x1 at [facility], and receiving Procardia 30XL daily. S/p Mg for seizure prophylaxis (-). S/p betamethasone (-) and s/p NICU consult. Pt asymptomatic this morning, without signs or symptoms of preeclampsia. BPs have been normotensive to mild range, 110-140s/70-90s today. Most recent HELLP labs WNL, next set in 2 days or sooner if there is a change in clinical status. Reports good fetal movement x2, denies ctx, VB or LOF. Reassuring maternal and fetal status x2.     Most recent BPP/growth with intermittently elevated umbilical artery Dopplers for Baby B and suboptimal growth. Will continue to reassess BPP 2x week. NST BID.    Юлия Frias MD  MFM Attending
MFM Attending     Pt evaluated and counseled on MFM rounds today.  Agree with the assessment and plan above.    In summary, Ms. Martinez is a 25yo G1 @ 31 0/7 weeks HD 9 s/p admission for preeclampsia with severe features by BP criteria.  This is a dichorionic twin gestation complicated by severe fetal growth restriction of twin B likely due to placental insufficiency with abnormal umbilical artery Doppler studies, most recently elevated S/Ds.  She is asymptomatic.  Her BPs are controlled on nifedipine 30mg XL.  She completed a course of  corticosteroids on 10/1.      Her plan is for admission until delivery.  Continue close BP monitoring and nifedipine 30mg XL daily.  HELLP labs every three days, next on 10/8.  Fetal monitoring with twice daily NSTs and twice weekly BPP/Dopplers.  Next growth on/after 10/18.  MOD:  for malpresentation twin B, at 34 weeks unless indicated earlier.
MFM Attending     Pt evaluated and counseled on MFM rounds today.  Agree with the assessment and plan above.    In summary, Ms. Martinez is a 25yo G1 @ 31 4/7 weeks HD 13 s/p admission for preeclampsia with severe features by BP criteria.  This is a dichorionic twin gestation complicated by severe fetal growth restriction of twin B likely due to placental insufficiency with abnormal umbilical artery Doppler studies, most recently elevated S/Ds.      She is asymptomatic at this time.  Her BPs are controlled on nifedipine 30mg XL.  She completed a course of  corticosteroids on 10/1.      HELLP labs sent today thus far normal - CMP pending.    Her plan is for admission until delivery.  Continue close BP monitoring and nifedipine 30mg XL daily.  HELLP labs every three days, next on 10/14.  Fetal monitoring with twice daily NSTs and twice weekly BPP/Dopplers.  Next growth on/after 10/18.  MOD:  for malpresentation twin B, at 34 weeks unless indicated earlier.  For magnesium at delivery and 24hrs postpartum.
MFM Attending Addendum    The patient was seen and evaluated on AM rounds with the MFM Resident Dr. Bolden.  I agree with the assessment and plan as listed.    Di/Di twins at 32 6/7 weeks, admitted 3 weeks ago as a transfer from [facility] for PEC with severe features based on BP and proteinuria criteria, FGR of twin B.  She is s/p 1 course of ACS, and BPs stable on Procardia 30XL once daily, with normal and stable HELLP labs.  Patient is asymptomatic and feels well (overnight she had one episode of seeing spots which was transient and has not reoccurred).  Agree with inpatient management until delivery via  (breech x 2, FGR of twin B) at 34 weeks (scheduled for 10/28/24 - will consider rescue course of ACS leading up to delivery); would deliver earlier than that if: severe range BPs not amenable to medication, HELLP, NRFHT, or persistent symptoms of severe disease.  MFM to follow.    SUN Samaniego MD
MFM Attending Addendum    The patient was seen and evaluated on AM rounds with the MFM Team, PA Ms. Loredo, and Fellow Dr. Bear.  I agree with the assessment and plan as listed.    Di/Di twins at 32 1/7 weeks, admitted just over 2 weeks ago as a transfer from [facility] for PEC with severe features based on BP criteria, FGR of twin B.  She is s/p course of ACS, and BPs stable on Procardia 30XL once daily, with normal and stable HELLP labs.  Patient is asymptomatic and feels well.  Agree with inpatient management until delivery via  (breech x 2, FGR of twin B) at 34 weeks; would deliver earlier than that if: severe range BPs not amenable to medication, HELLP, NRFHT, or persistent symptoms of severe disease.  MFM to follow.    SUN Samaniego MD
MFM Attending Addendum    The patient was seen and evaluated on AM rounds with the MFM Team.  I agree with the assessment and plan as listed.    Di/Di twins at 32 4/7 weeks, admitted 3 weeks ago as a transfer from [facility] for PEC with severe features based on BP and proteinuria criteria, FGR of twin B.  She is s/p course of ACS, and BPs stable on Procardia 30XL once daily (only one mild range overnight), with normal and stable HELLP labs.  Patient is asymptomatic and feels well.  Agree with inpatient management until delivery via  (breech x 2, FGR of twin B) at 34 weeks (scheduled for 10/28/24); would deliver earlier than that if: severe range BPs not amenable to medication, HELLP, NRFHT, or persistent symptoms of severe disease.  MFM to follow.    SUN Samaniego MD
MFM Attending Addendum  The patient was seen and evaluated on AM rounds with CHANDRA Loredo, and discussed with the the MFM Resident Dr. Bolden and MFM Fellow Dr. Bear.  I agree with the assessment and plan as listed.    Di/Di twins at 33 0/7 weeks transferred 3 weeks ago from [facility] for PEC with severe features based on BP and proteinuria criteria plus FGR of twin B.  - s/p 1 course of BMZ  - serial BPs for goal < 160 systolic and < 110 diastolic; stable on Procardia 30XL once daily, with normal and stable HELLP labs as of this am.    - Inpatient management until delivery via  (breech x 2, FGR of twin B) at 34 weeks (scheduled for 10/28/24)  - Delivery < 34 weeks if: severe range BPs not improving with medication, HELLP, NRFHT, poor fetal growth, or persistent symptoms of severe disease.    - Given association of early onset severe PEC + FGR with future cardiovascular disease, will recommend CardioObstetrics consultation and follow up.  - If delivery indicated < 34 weeks, evaluate for APLS  - Appreciate NICU consult    Deyanira Nguyen
MFM Attending Addendum  The patient was seen and evaluated on AM rounds with r3 Dr. Bolden, mfm fellow Dr. Bear, and PA Jaleesa Loredo.    Di/Di twins at 33 4/7 weeks transferred from [facility] for PEC with severe features based on BP and proteinuria criteria plus FGR of twin B.    - s/p 1 course of BMZ; completing 2nd dose of rescue course today  - serial BPs for goal < 160 systolic and < 110 diastolic; stable on Procardia 30XL once daily, with normal and stable HELLP labs.    - Inpatient management until delivery via  (breech x 2, FGR of twin B) at 34 weeks (scheduled for 10/28/24)  - Delivery < 34 weeks if: severe range BPs not improving with medication, HELLP, NRFHT, poor fetal growth, or persistent symptoms of severe disease.    - Given association of early onset severe PEC + FGR with future cardiovascular disease, will recommend CardioObstetrics consultation and follow up.  - If delivery indicated < 34 weeks, evaluate for APLS  - Appreciate NICU consult    Deyanira Nguyen
MFM Attending Addendum  The patient was seen and evaluated on AM rounds with r3 Dr. Rose.    Di/Di twins at 33 6/7 weeks transferred from [facility] for PEC with severe features based on BP and proteinuria criteria plus FGR of twin B.    - s/p 2 courses of BMZ; completed 2nd course 10/25/24 in anticipation of delivery tomorrow  - serial BPs for goal < 160 systolic and < 110 diastolic; stable on Procardia 30XL once daily, with normal and stable HELLP labs.      - Inpatient management until delivery via  (breech x 2, FGR of twin B) at 34 weeks (scheduled for 10/28/24).   - Magnesium intrapartum and x24 hours postpartum for seizure prophlyaxis  - Delivery < 34 weeks if: severe range BPs not improving with medication, HELLP, NRFHT, poor fetal growth, or persistent symptoms of severe disease.    - Given association of early onset severe PEC + FGR with future cardiovascular disease, will recommend CardioObstetrics consultation and follow up.  - If delivery indicated < 34 weeks, evaluate for APLS  - Appreciate NICU consult    Deyanira Nguyen
Patient seen and evaluated on Rounds. Agree with the documentation as above.     In brief, this is a 24y  at 30w1d, transferred from [facility], admitted for preeclampsia with severe features and severe FGR of baby B (EFW 701g <1%). Pt is s/p Labetalol 20 IVP x1 at [facility], and was initiated on Procardia 30XL. S/p Mg for seizure prophylaxis (-). S/p betamethasone (-). S/p NICU consult. Pt feels well this morning, without signs or symptoms of preeclampsia. BPs have been normotensive to mild range. Reports good fetal movement x2, denies ctx, VB or LOF. Reassuring maternal and fetal status x2.     NST BID. Receiving Heparin SQ for DVT prophylaxis.     Юлия Frias MD  M Attending
Patient seen and evaluated on Rounds. Agree with the documentation as above.     In brief, this is a 24y  at 30w2d, transferred from [facility], admitted for preeclampsia with severe features and severe FGR of baby B (EFW 701g <1%). Pt is s/p Labetalol 20 IVP x1 at [facility], and receiving Procardia 30XL daily. S/p Mg for seizure prophylaxis (-). S/p betamethasone (-) and s/p NICU consult. Pt feels well this morning, without signs or symptoms of preeclampsia. BPs have been normotensive, 121-132/76-81 today. HELLP labs today WNL, next set in 3 days or sooner if there is a change in clinical status. Reports good fetal movement x2, denies ctx, VB or LOF. Reassuring maternal and fetal status x2.     NST BID. Receiving Heparin SQ for DVT prophylaxis. For BPP tomorrow.    Юлия CHRISTENSENM Attending
Patient seen and evaluated on Rounds. Agree with the documentation as above.     In brief, this is a 24y  at 30w3d, transferred from [facility], admitted for preeclampsia with severe features and severe FGR of baby B (EFW 701g <1%). Pt is s/p Labetalol 20 IVP x1 at [facility], and receiving Procardia 30XL daily. S/p Mg for seizure prophylaxis (-). S/p betamethasone (-) and s/p NICU consult. Pt feels well this morning, without signs or symptoms of preeclampsia. BPs have been normotensive, 117-127/77-85 today. Most recent HELLP labs WNL, next set in 2 days or sooner if there is a change in clinical status. Reports good fetal movement x2, denies ctx, VB or LOF. Reassuring maternal and fetal status x2.     NST BID. Receiving Heparin SQ for DVT prophylaxis. For BPP tomorrow.    Юлия JASSO Attending
Patient seen and evaluated on Rounds. Agree with the documentation as above.     In brief, this is a 24y  at 30w4d, transferred from [facility], admitted for preeclampsia with severe features and severe FGR of baby B (EFW 888g <1%). Pt is s/p Labetalol 20 IVP x1 at [facility], and receiving Procardia 30XL daily. S/p Mg for seizure prophylaxis (-). S/p betamethasone (-) and s/p NICU consult. Pt had mild FRAGOSO this morning, which resolved with Tylenol administration. Otherwise feels well, without signs or symptoms of preeclampsia. BPs have been normotensive, 120-130s/70-80s today. Most recent HELLP labs WNL, next set in tomorrow or sooner if there is a change in clinical status. Reports good fetal movement x2, denies ctx, VB or LOF. Reassuring maternal and fetal status x2.     S/p BPP and growth today. Growth Baby A: 3lb5oz (21%), Baby B: 1lb 15oz (<1%, AC<1%, suboptimal growth). Baby B with intermittently elevated umbilical artery Dopplers. Will continue to reassess BPP 2x week. NST BID.    Юлия JASSO Attending
MFM Attending Addendum  The patient was seen and evaluated on AM rounds with r3 Dr. Bolden, mfm fellow Dr. Bear, and PA Jaleesa Loredo.    Di/Di twins at 33 1/7 weeks transferred 3 weeks ago from [facility] for PEC with severe features based on BP and proteinuria criteria plus FGR of twin B.  - s/p 1 course of BMZ  - serial BPs for goal < 160 systolic and < 110 diastolic; stable on Procardia 30XL once daily, with normal and stable HELLP labs.    - Inpatient management until delivery via  (breech x 2, FGR of twin B) at 34 weeks (scheduled for 10/28/24)  - Delivery < 34 weeks if: severe range BPs not improving with medication, HELLP, NRFHT, poor fetal growth, or persistent symptoms of severe disease.    - Given association of early onset severe PEC + FGR with future cardiovascular disease, will recommend CardioObstetrics consultation and follow up.  - If delivery indicated < 34 weeks, evaluate for APLS  - Appreciate NICU consult    Deyanira Nguyen
MFM Attending Addendum  The patient was seen and evaluated on AM rounds with r3 Dr. Rose and mfm fellow / OB  Dr. Dai.    Di/Di twins at 33 5/7 weeks transferred from [facility] for PEC with severe features based on BP and proteinuria criteria plus FGR of twin B.    - s/p 2 courses of BMZ; completed 2nd course 10/25/24 in anticipation of delivery next week  - serial BPs for goal < 160 systolic and < 110 diastolic; stable on Procardia 30XL once daily, with normal and stable HELLP labs.    - Inpatient management until delivery via  (breech x 2, FGR of twin B) at 34 weeks (scheduled for 10/28/24)  - Delivery < 34 weeks if: severe range BPs not improving with medication, HELLP, NRFHT, poor fetal growth, or persistent symptoms of severe disease.    - Given association of early onset severe PEC + FGR with future cardiovascular disease, will recommend CardioObstetrics consultation and follow up.  - If delivery indicated < 34 weeks, evaluate for APLS  - Appreciate NICU consult    Deyanira Nguyen
MFM Attending     Pt evaluated and counseled on MFM rounds today.  Agree with the assessment and plan above.    In summary, Ms. Martinez is a 23yo G1 @ 31 3/7 weeks HD 12 s/p admission for preeclampsia with severe features by BP criteria.  This is a dichorionic twin gestation complicated by severe fetal growth restriction of twin B likely due to placental insufficiency with abnormal umbilical artery Doppler studies, most recently elevated S/Ds.      She is asymptomatic - intermittent mild headaches always resolve with acetaminophen/sleep.  Her BPs are controlled on nifedipine 30mg XL.  She completed a course of  corticosteroids on 10/1.      Her plan is for admission until delivery.  Continue close BP monitoring and nifedipine 30mg XL daily.  HELLP labs every three days, next on 10/11.  Fetal monitoring with twice daily NSTs and twice weekly BPP/Dopplers.  Next growth on/after 10/18.  MOD:  for malpresentation twin B, at 34 weeks unless indicated earlier.  For magnesium at delivery and 24hrs postpartum.
MFM Attending Addendum  The patient was seen and evaluated on AM rounds with r3 Dr. Bolden, mfm fellow Dr. Bear, and PA Jaleesa Loredo.    Di/Di twins at 33 2/7 weeks transferred from [facility] for PEC with severe features based on BP and proteinuria criteria plus FGR of twin B.    - s/p 1 course of BMZ  - serial BPs for goal < 160 systolic and < 110 diastolic; stable on Procardia 30XL once daily, with normal and stable HELLP labs.    - Inpatient management until delivery via  (breech x 2, FGR of twin B) at 34 weeks (scheduled for 10/28/24)  - Delivery < 34 weeks if: severe range BPs not improving with medication, HELLP, NRFHT, poor fetal growth, or persistent symptoms of severe disease.    - Given association of early onset severe PEC + FGR with future cardiovascular disease, will recommend CardioObstetrics consultation and follow up.  - If delivery indicated < 34 weeks, evaluate for APLS  - Appreciate NICU consult    Deyanira Nguyen

## 2024-11-01 NOTE — LACTATION INITIAL EVALUATION - LACTATION INTERVENTIONS
met with mother in room. Pumping guidelines reviewed. stressed importance of frequency and impact on supply. Provided mother with a cooler bag and reusable ice pack to transport expressed human milk to NICU from home. pump rental encouraged. support provided. needs met at this time./initiate/review hand expression/initiate/review pumping guidelines and safe milk handling
Gave written lactation resources for when a  delivery is expected. discussed the option of DHM if desired until her larger milk volumes are in.
met with mother in room. Pumping guidelines reviewed. stressed importance of frequency and impact on supply. pump rental encouraged. support provided. needs met at this time./initiate/review hand expression/initiate/review pumping guidelines and safe milk handling
Reinforced pumping guidelines, storage & handling of EHM.  pump consult was done/initiate/review safe skin-to-skin/initiate/review hand expression/initiate/review pumping guidelines and safe milk handling/initiate/review supplementation plan due to medical indications
Reinforced pumping guidelines, storage & handling of EHM. Provided mother with a cooler bag and reusable ice pack to transport expressed human milk to NICU from home. Mom pumping 6 times yesterday, Discussed importance of 8 times per day, reviewed strategies to achieve goals.  discussed pump hygiene, to continue to was parts after each use and sterilize once per 24 hours/initiate/review safe skin-to-skin/initiate/review hand expression/initiate/review pumping guidelines and safe milk handling/initiate/review supplementation plan due to medical indications/review techniques to increase milk supply

## 2024-11-01 NOTE — LACTATION INITIAL EVALUATION - NS LACT CON REASON FOR REQ
34 week infant twins in nicu for prematurity/primaparous mom/premature infant/follow up consultation
follow up lactation visit, mom being d/c home today, twin babies will remain in NICU/primaparous mom/premature infant/follow up consultation
antepartum consult/primaparous mom
34 week infant twins in nicu for prematurity/primaparous mom/premature infant/NICU admission
pump request/premature infant/provider request/NICU admission

## 2024-11-01 NOTE — PROGRESS NOTE ADULT - ASSESSMENT
23yo POD#4 from pLTCS for di/di TIUP @34wk for sPEC, QBL 1335. Overnight, patient has been without any complaints. Patient is otherwise progressing appropriately post-op and is asymptomatic at time of evaluation.     #sPEC  - s/p 2hrs of Mg for seizure ppx  - Continue Procardia 30XL. Normotensive 110-120s/70-80s  - Denies sxs sPEC  - Patient to follow up with cardio OB outpatient    #Asthma  -Albuterol q6h PRN. Asymptomatic at time of eval     #Postpartum  - HSQ for DVT ppx  - Encourage ambulation  - Tylenol, Motrin, Oxy for pain control  - Discharge planning. Patient wishes to follow up with Dr. SUKHWINDER Bolden, PGY-3  Obstetrics & Gynecology

## 2024-11-01 NOTE — LACTATION INITIAL EVALUATION - INTERVENTION OUTCOME
verbalizes understanding/demonstrates understanding of teaching/good return demonstration/needs met
verbalizes understanding/demonstrates understanding of teaching/good return demonstration/needs met/Lactation team to follow up
verbalizes understanding/demonstrates understanding of teaching/needs met
verbalizes understanding/demonstrates understanding of teaching/good return demonstration/needs met
verbalizes understanding/demonstrates understanding of teaching/good return demonstration/needs met/Lactation team to follow up

## 2024-11-01 NOTE — PROGRESS NOTE ADULT - ATTENDING SUPERVISION STATEMENT
Resident
Resident/Fellow
Resident
Resident/Fellow
Resident
Resident/Fellow
Resident/Fellow
Resident
Resident/Fellow
Resident
Resident
Within functional limits

## 2024-11-01 NOTE — LACTATION INITIAL EVALUATION - DELIVERY COMPLICATIONS; MALPRESENTATION
Double Footling Breech Delivery of Baby A & Baby B

## 2024-11-01 NOTE — LACTATION INITIAL EVALUATION - POTENTIAL FOR
knowledge deficit
ineffective breastfeeding/knowledge deficit
knowledge deficit
ineffective breastfeeding/knowledge deficit
knowledge deficit

## 2024-11-01 NOTE — PROGRESS NOTE ADULT - PROVIDER SPECIALTY LIST ADULT
OB
Anesthesia
Anesthesia
OB

## 2024-11-01 NOTE — LACTATION INITIAL EVALUATION - AS EVIDENCED BY
prematurity/multiple birth/infant  from mother
observation/prematurity/multiple birth/infant  from mother
patient stated
patient stated/prematurity/multiple birth/infant  from mother
patient stated/prematurity/multiple birth/infant  from mother

## 2024-11-01 NOTE — PROGRESS NOTE ADULT - SUBJECTIVE AND OBJECTIVE BOX
OB Postpartum Note:  Delivery, POD#4    S: Pain controlled. She is tolerating a regular diet and passing flatus. She is voiding spontaneously, and ambulating. Denies CP/SOB. Denies lightheadedness/dizziness. Denies N/V.    Denies any headaches or scotomas     O:  Vitals:  Vital Signs Last 24 Hrs  T(C): 36.6 (2024 05:44), Max: 37.4 (31 Oct 2024 20:10)  T(F): 97.9 (2024 05:44), Max: 99.3 (31 Oct 2024 20:10)  HR: 102 (2024 05:44) (99 - 119)  BP: 116/83 (2024 05:44) (110/79 - 128/82)  BP(mean): --  RR: 18 (2024 05:44) (18 - 18)  SpO2: 98% (2024 05:44) (95% - 98%)    Parameters below as of 2024 05:44  Patient On (Oxygen Delivery Method): room air        MEDICATIONS  (STANDING):  acetaminophen     Tablet .. 975 milliGRAM(s) Oral <User Schedule>  heparin   Injectable 5000 Unit(s) SubCutaneous every 12 hours  ibuprofen  Tablet. 600 milliGRAM(s) Oral every 6 hours  lactated ringers. 1000 milliLiter(s) (50 mL/Hr) IV Continuous <Continuous>  lactated ringers. 1000 milliLiter(s) (125 mL/Hr) IV Continuous <Continuous>  magnesium sulfate Infusion 2 Gm/Hr (50 mL/Hr) IV Continuous <Continuous>  NIFEdipine XL 30 milliGRAM(s) Oral every 24 hours  prenatal multivitamin 1 Tablet(s) Oral daily    MEDICATIONS  (PRN):  acetaminophen     Tablet .. 975 milliGRAM(s) Oral every 6 hours PRN Mild Pain (1 - 3), Moderate Pain (4 - 6)  albuterol    90 MICROgram(s) HFA Inhaler 2 Puff(s) Inhalation every 6 hours PRN Shortness of Breath and/or Wheezing  metoclopramide 10 milliGRAM(s) Oral every 8 hours PRN headache  ondansetron Injectable 4 milliGRAM(s) IV Push every 8 hours PRN Nausea and/or Vomiting      LABS:  Blood type: O Positive  Rubella IgG: RPR:                     Physical exam:  Gen: NAD. Resting comfortably prior to eval   Pulm: Unlabored breathing. No respiratory distress  Abdomen: Soft. Non-tender. Non-distended. Firm fundus   Incision: Clean, dry, and intact   Ext: No calf tenderness bilaterally

## 2024-11-02 ENCOUNTER — EMERGENCY (EMERGENCY)
Facility: HOSPITAL | Age: 24
LOS: 1 days | Discharge: HOME | End: 2024-11-02
Attending: EMERGENCY MEDICINE
Payer: MEDICAID

## 2024-11-02 VITALS
OXYGEN SATURATION: 99 % | HEART RATE: 116 BPM | SYSTOLIC BLOOD PRESSURE: 150 MMHG | WEIGHT: 179.9 LBS | DIASTOLIC BLOOD PRESSURE: 94 MMHG | TEMPERATURE: 98 F | HEIGHT: 65 IN | RESPIRATION RATE: 20 BRPM

## 2024-11-02 VITALS
OXYGEN SATURATION: 99 % | RESPIRATION RATE: 17 BRPM | HEART RATE: 84 BPM | SYSTOLIC BLOOD PRESSURE: 116 MMHG | DIASTOLIC BLOOD PRESSURE: 81 MMHG | TEMPERATURE: 98 F

## 2024-11-02 DIAGNOSIS — Z98.891 HISTORY OF UTERINE SCAR FROM PREVIOUS SURGERY: Chronic | ICD-10-CM

## 2024-11-02 LAB
ADD ON TEST-SPECIMEN IN LAB: SIGNIFICANT CHANGE UP
ALBUMIN SERPL ELPH-MCNC: 3.9 G/DL — SIGNIFICANT CHANGE UP (ref 3.3–5)
ALP SERPL-CCNC: 195 U/L — HIGH (ref 40–120)
ALT FLD-CCNC: 51 U/L — HIGH (ref 10–45)
ANION GAP SERPL CALC-SCNC: 14 MMOL/L — SIGNIFICANT CHANGE UP (ref 5–17)
APTT BLD: 27.2 SEC — SIGNIFICANT CHANGE UP (ref 24.5–35.6)
AST SERPL-CCNC: 39 U/L — SIGNIFICANT CHANGE UP (ref 10–40)
BASOPHILS # BLD AUTO: 0.05 K/UL — SIGNIFICANT CHANGE UP (ref 0–0.2)
BASOPHILS NFR BLD AUTO: 0.4 % — SIGNIFICANT CHANGE UP (ref 0–2)
BILIRUB DIRECT SERPL-MCNC: <0.1 MG/DL — SIGNIFICANT CHANGE UP (ref 0–0.3)
BILIRUB INDIRECT FLD-MCNC: >0.2 MG/DL — SIGNIFICANT CHANGE UP (ref 0.2–1)
BILIRUB SERPL-MCNC: 0.3 MG/DL — SIGNIFICANT CHANGE UP (ref 0.2–1.2)
BUN SERPL-MCNC: 18 MG/DL — SIGNIFICANT CHANGE UP (ref 7–23)
CALCIUM SERPL-MCNC: 9.6 MG/DL — SIGNIFICANT CHANGE UP (ref 8.4–10.5)
CHLORIDE SERPL-SCNC: 104 MMOL/L — SIGNIFICANT CHANGE UP (ref 96–108)
CO2 SERPL-SCNC: 22 MMOL/L — SIGNIFICANT CHANGE UP (ref 22–31)
CREAT SERPL-MCNC: 0.52 MG/DL — SIGNIFICANT CHANGE UP (ref 0.5–1.3)
EGFR: 131 ML/MIN/1.73M2 — SIGNIFICANT CHANGE UP
EOSINOPHIL # BLD AUTO: 0.17 K/UL — SIGNIFICANT CHANGE UP (ref 0–0.5)
EOSINOPHIL NFR BLD AUTO: 1.4 % — SIGNIFICANT CHANGE UP (ref 0–6)
GAS PNL BLDV: SIGNIFICANT CHANGE UP
GLUCOSE SERPL-MCNC: 95 MG/DL — SIGNIFICANT CHANGE UP (ref 70–99)
HCT VFR BLD CALC: 36.4 % — SIGNIFICANT CHANGE UP (ref 34.5–45)
HGB BLD-MCNC: 11.2 G/DL — LOW (ref 11.5–15.5)
IMM GRANULOCYTES NFR BLD AUTO: 1.6 % — HIGH (ref 0–0.9)
INR BLD: 0.88 RATIO — SIGNIFICANT CHANGE UP (ref 0.85–1.16)
LDH SERPL L TO P-CCNC: 200 U/L — SIGNIFICANT CHANGE UP (ref 50–242)
LYMPHOCYTES # BLD AUTO: 18.3 % — SIGNIFICANT CHANGE UP (ref 13–44)
LYMPHOCYTES # BLD AUTO: 2.28 K/UL — SIGNIFICANT CHANGE UP (ref 1–3.3)
MAGNESIUM SERPL-MCNC: 2.1 MG/DL — SIGNIFICANT CHANGE UP (ref 1.6–2.6)
MCHC RBC-ENTMCNC: 27.1 PG — SIGNIFICANT CHANGE UP (ref 27–34)
MCHC RBC-ENTMCNC: 30.8 G/DL — LOW (ref 32–36)
MCV RBC AUTO: 87.9 FL — SIGNIFICANT CHANGE UP (ref 80–100)
MONOCYTES # BLD AUTO: 0.84 K/UL — SIGNIFICANT CHANGE UP (ref 0–0.9)
MONOCYTES NFR BLD AUTO: 6.7 % — SIGNIFICANT CHANGE UP (ref 2–14)
NEUTROPHILS # BLD AUTO: 8.92 K/UL — HIGH (ref 1.8–7.4)
NEUTROPHILS NFR BLD AUTO: 71.6 % — SIGNIFICANT CHANGE UP (ref 43–77)
NRBC # BLD: 0 /100 WBCS — SIGNIFICANT CHANGE UP (ref 0–0)
NT-PROBNP SERPL-SCNC: <36 PG/ML — SIGNIFICANT CHANGE UP (ref 0–300)
PLATELET # BLD AUTO: 355 K/UL — SIGNIFICANT CHANGE UP (ref 150–400)
POTASSIUM SERPL-MCNC: 4.2 MMOL/L — SIGNIFICANT CHANGE UP (ref 3.5–5.3)
POTASSIUM SERPL-SCNC: 4.2 MMOL/L — SIGNIFICANT CHANGE UP (ref 3.5–5.3)
PROT SERPL-MCNC: 7.5 G/DL — SIGNIFICANT CHANGE UP (ref 6–8.3)
PROTHROM AB SERPL-ACNC: 10 SEC — SIGNIFICANT CHANGE UP (ref 9.9–13.4)
RBC # BLD: 4.14 M/UL — SIGNIFICANT CHANGE UP (ref 3.8–5.2)
RBC # FLD: 14.6 % — HIGH (ref 10.3–14.5)
SODIUM SERPL-SCNC: 140 MMOL/L — SIGNIFICANT CHANGE UP (ref 135–145)
TROPONIN T, HIGH SENSITIVITY RESULT: <6 NG/L — SIGNIFICANT CHANGE UP (ref 0–51)
URATE SERPL-MCNC: 5.5 MG/DL — SIGNIFICANT CHANGE UP (ref 2.5–7)
WBC # BLD: 12.46 K/UL — HIGH (ref 3.8–10.5)
WBC # FLD AUTO: 12.46 K/UL — HIGH (ref 3.8–10.5)

## 2024-11-02 PROCEDURE — 83880 ASSAY OF NATRIURETIC PEPTIDE: CPT

## 2024-11-02 PROCEDURE — 82248 BILIRUBIN DIRECT: CPT

## 2024-11-02 PROCEDURE — 83615 LACTATE (LD) (LDH) ENZYME: CPT

## 2024-11-02 PROCEDURE — 84295 ASSAY OF SERUM SODIUM: CPT

## 2024-11-02 PROCEDURE — 96375 TX/PRO/DX INJ NEW DRUG ADDON: CPT

## 2024-11-02 PROCEDURE — 99285 EMERGENCY DEPT VISIT HI MDM: CPT

## 2024-11-02 PROCEDURE — 85018 HEMOGLOBIN: CPT

## 2024-11-02 PROCEDURE — 93005 ELECTROCARDIOGRAM TRACING: CPT

## 2024-11-02 PROCEDURE — 83605 ASSAY OF LACTIC ACID: CPT

## 2024-11-02 PROCEDURE — 82803 BLOOD GASES ANY COMBINATION: CPT

## 2024-11-02 PROCEDURE — 82330 ASSAY OF CALCIUM: CPT

## 2024-11-02 PROCEDURE — 84132 ASSAY OF SERUM POTASSIUM: CPT

## 2024-11-02 PROCEDURE — 96374 THER/PROPH/DIAG INJ IV PUSH: CPT

## 2024-11-02 PROCEDURE — 82435 ASSAY OF BLOOD CHLORIDE: CPT

## 2024-11-02 PROCEDURE — 85610 PROTHROMBIN TIME: CPT

## 2024-11-02 PROCEDURE — 82947 ASSAY GLUCOSE BLOOD QUANT: CPT

## 2024-11-02 PROCEDURE — 80053 COMPREHEN METABOLIC PANEL: CPT

## 2024-11-02 PROCEDURE — 85014 HEMATOCRIT: CPT

## 2024-11-02 PROCEDURE — 83735 ASSAY OF MAGNESIUM: CPT

## 2024-11-02 PROCEDURE — 84550 ASSAY OF BLOOD/URIC ACID: CPT

## 2024-11-02 PROCEDURE — 71045 X-RAY EXAM CHEST 1 VIEW: CPT

## 2024-11-02 PROCEDURE — 85025 COMPLETE CBC W/AUTO DIFF WBC: CPT

## 2024-11-02 PROCEDURE — 99285 EMERGENCY DEPT VISIT HI MDM: CPT | Mod: 25

## 2024-11-02 PROCEDURE — 84484 ASSAY OF TROPONIN QUANT: CPT

## 2024-11-02 PROCEDURE — 85730 THROMBOPLASTIN TIME PARTIAL: CPT

## 2024-11-02 PROCEDURE — 71045 X-RAY EXAM CHEST 1 VIEW: CPT | Mod: 26

## 2024-11-02 RX ORDER — DIPHENHYDRAMINE HCL 12.5MG/5ML
25 ELIXIR ORAL ONCE
Refills: 0 | Status: COMPLETED | OUTPATIENT
Start: 2024-11-02 | End: 2024-11-02

## 2024-11-02 RX ORDER — NIFEDIPINE 90 MG
30 TABLET, EXTENDED RELEASE 24 HR ORAL ONCE
Refills: 0 | Status: COMPLETED | OUTPATIENT
Start: 2024-11-02 | End: 2024-11-02

## 2024-11-02 RX ORDER — ACETAMINOPHEN 500 MG
1000 TABLET ORAL ONCE
Refills: 0 | Status: COMPLETED | OUTPATIENT
Start: 2024-11-02 | End: 2024-11-02

## 2024-11-02 RX ADMIN — Medication 25 MILLIGRAM(S): at 07:46

## 2024-11-02 RX ADMIN — Medication 30 MILLIGRAM(S): at 07:45

## 2024-11-02 RX ADMIN — Medication 400 MILLIGRAM(S): at 07:45

## 2024-11-02 NOTE — ED PROVIDER NOTE - NSFOLLOWUPINSTRUCTIONS_ED_ALL_ED_FT
You come to the emergency department for high blood pressure and headache.  We gave you Tylenol and Benadryl.  Your headache is currently resolved.  I give you your home dose Procardia 30 while you are in the emergency department.  Your blood pressure has been at goal after the medication.  Discussed with OB/GYN, they are comfortable with you going home with close outpatient follow-up, which is already scheduled.  Please follow-up with your OB/GYN as scheduled.  Congratulations on your new babies.     What warning signs do I need to watch for?  BP that starts to increase or reaches 130/80 or higher  Shortness of breath  Nausea and vomiting, or severe stomach pain  Severe headaches  Vision changes, or seeing spots in front of your eyes  Arm or leg swelling    *********************************************************************  Preeclampsia and Eclampsia After Delivery    WHAT YOU NEED TO KNOW:    What do I need to know about preeclampsia and eclampsia after delivery? Preeclampsia is high blood pressure (BP) that usually develops after week 20 of pregnancy. When it develops days to weeks after delivery, it may be called postpartum preeclampsia. Preeclampsia causes your BP to be 140/90 or higher. You may also have protein in your urine or organ damage. Preeclampsia can lead to life-threatening conditions such as a stroke or HELLP syndrome (blood cell destruction). It can also lead to eclampsia, a condition that causes seizures from high BP.    Blood Pressure Readings  What warning signs do I need to watch for?  BP that starts to increase or reaches 130/80 or higher  Shortness of breath  Nausea and vomiting, or severe stomach pain  Severe headaches  Vision changes, or seeing spots in front of your eyes  Arm or leg swelling    What can I do to manage or prevent preeclampsia or eclampsia after delivery?    Go to follow-up appointments as directed. Your obstetrician will check your blood pressure regularly until it is normal. He or she may also order other tests.  Take medicines as directed. Medicines may be given to lower your blood pressure, protect your organs, or prevent seizures.  Rest during the day. Your healthcare provider may tell you to rest more often if you have mild symptoms of preeclampsia.  Do not drink alcohol or smoke. Alcohol, nicotine, and other chemicals in cigarettes and cigars, can increase your BP. They can also harm your baby. Ask your healthcare provider for information if you currently drink alcohol or smoke and need help to quit. E-cigarettes or smokeless tobacco still contain nicotine. Talk to your healthcare provider before you use these products.    Call your local emergency number (911 in the [place]) if:  You have a seizure.  You have chest pain.  You have severe nausea and vomiting.    When should I call my doctor?  You develop a severe headache that does not go away with medicine.  You have new or increased vision changes, such as blurred or spotted vision.  You have new or increased swelling in your face or hands.  You have severe abdominal pain with or without nausea and vomiting.  You have shortness of breath.  Your blood pressure is higher than you were told it should be.  You have questions or concerns about your condition or care.    CARE AGREEMENT:  You have the right to help plan your care. Learn about your health condition and how it may be treated. Discuss treatment options with your healthcare providers to decide what care you want to receive. You always have the right to refuse treatment.

## 2024-11-02 NOTE — CONSULT NOTE ADULT - NS ATTEND AMEND GEN_ALL_CORE FT
23yo now P2 POD#5 sp pLTCS (10/28) @34wks di/di TIUP with history of preeclampsia with severe feature s/p Magnesium postpartum and recently discharged on 11/1 on Procardia 30mg Xl. BPs on discharge were well controlled. Patient presented to ED for systolic BP of 170s at home prior to taking Procardia dose. In the ED, her bps have been in the non-severe range and patient's headache resolved. She received her Procardia 30mg in the ED at 0745 and BPs have been normal since then. Labs were reviewed and were wnl except for slightly elevated ALT to 50s. Patient stable for discharge with BP and preeclampsia precautions. We will arrange for close followup in the next week for BP check and repeat labs.

## 2024-11-02 NOTE — ED PROVIDER NOTE - ATTENDING CONTRIBUTION TO CARE
attending Sal: 24yF  s/p planned c section 10/28, discharged on  (yesterday) after being treated for preeclampsia after a month long admission. Now with elevated BPs this morning with associated mild headache. Exam as above. Will review prior admission records, obtain serial BPs, ekg, labs, discuss with OBGYN, reassess

## 2024-11-02 NOTE — ED PROVIDER NOTE - OBJECTIVE STATEMENT
24 year old female complaining of hypertension. . Planned c section 10/28, discharged on  (yesterday) after being treated for preeclampsia after a month long admission. Patient discharged on NIFEdipine XL 30 milliGRAM(s) and was advised to take blood pressure at home. Patient's blood pressure at home was 170/111 associated with headache this morning. Called clinic, told to not take procadia and return to ED. No fever, no chill. + HA. no blurred vision, no numbness tingling. mild abdominal pain, vaginal bleeding but slowed down since delivery. No other concern. 24 year old female complaining of hypertension. . Planned c section 10/28, discharged on  (yesterday) after being treated for preeclampsia after a month long admission. Patient discharged on NIFEdipine XL 30 milliGRAM(s) and was advised to take blood pressure at home. Patient's blood pressure at home was 170/111 associated with headache this morning. Called clinic, told to not take procadia and return to ED. No fever, no chill. + HA. no blurred vision, no numbness tingling. mild abdominal pain, vaginal bleeding but slowed down since delivery. No other concern. Pt is breast feeding.

## 2024-11-02 NOTE — ED PROVIDER NOTE - PHYSICAL EXAMINATION
GENERAL: Alert. No acute distress.   EYES: EOMI grossly normal. Anicteric.   HENT: Moist mucous membranes.   RESP: No conversation dyspnea, no resp distress, CTAB   CARDIOVASCULAR: RRR, no m/g/r  ABDOMEN: soft, non distended, nttp, Well-healed  scar  MSK: ROM grossly normal in all 4 extremities. No deformities  SKIN: warm and dry  NEUROLOGIC: Alert and oriented x3,  5/5 strength in all 4 extremities. Sensation grossly intact in all 4 extremities.   PSYCHIATRIC: Cooperative. Appropriate mood and affect

## 2024-11-02 NOTE — ED ADULT NURSE NOTE - OBJECTIVE STATEMENT
Patient is a 24 year old female complaining of hypertension. . Planned c section 10/28, discharged on  after being treated for preeclampsia after a month long admission. Patient discharged on NIFEdipine XL 30 milliGRAM(s) and was advised to take blood pressure at home. Patient's blood pressure at home was 170/111 associated with headache. On assessment patient is A&Ox4, breathing comfortably on room air, no accessory muscle use, no cough, chest rise and fall equal, sinus tach on the cardiac monitor, no JVD, no edema noted, strong bilateral peripheral pulses, abdomen soft nontender,  steri strips in place clean dry and intake, skin warm and normal for race. Patient denies dizziness, palpitations, cough, SOB, abdominal pain, n/v/d, urinary symptoms, fevers, chills, weakness at this time.

## 2024-11-02 NOTE — ED PROVIDER NOTE - PROGRESS NOTE DETAILS
Fran Adams) [facility] PGY-3: initial pressure 151/101, repeat 120/84. Now 132/89. Fran Adams) [facility] PGY-3: initial pressure 151/101, repeat 120/84. Now 132/89. discussion with OB resident. Agreed with Lab. will add on LDH per OB recs. Treat HA with tylenol and benadryl. Give home dose procardia. dispo pending lab work. Fran Adams) [facility] PGY-3: Discussed with OB/GYN again.   Happy with blood pressure trends since in the emergency department.  Okay to discharge home with close follow-up, which is already scheduled  in a few days.   Patient has not give a urine sample, however, per your OB/GYN, they do not require a urine sample.  Reexamined patient at bedside, headache completely resolved right now.  Will discharge patient.

## 2024-11-02 NOTE — CONSULT NOTE ADULT - ASSESSMENT
23yo  POD#5 sp pLTCS @34wks di/di TIUP c/b sPEC sp Mg, now presents to ED with elevated BPs. BPs initially 150/98, most recently 120s/80s.    Recommendations:  - Monitor vital signs [id number]  - F/u HELLP labs (CBC/CMP/lactate dehydrogenase/uric acid)  - Continue to monitor    Doc Nair PGY2  23yo  POD#5 sp pLTCS (10/28) @34wks di/di TIUP c/b sPEC sp Mg, now presents to ED with elevated BPs. BPs initially 150/98, most recently 120s/80s.    Recommendations:  - Monitor vital signs [id number]  - F/u HELLP labs (CBC/CMP/lactate dehydrogenase/uric acid)  - Continue to monitor    Doc Nair PGY2  23yo  POD#5 sp pLTCS (10/28) @34wks di/di TIUP c/b sPEC sp Mg, now presents to ED with elevated BPs. BPs initially 150/98, most recently 110-120s/70-80s.    Recommendations:  - Monitor vital signs [id number]  - F/u HELLP labs (CBC/CMP/lactate dehydrogenase/uric acid) - ALT slightly elevated at 51, however all other labs wnl and not yet meeting criteria for exacerbation of sPEC  - Pt asymptomatic   - Pt received Pro30, BPs wnl  - Continue to monitor    oDc Nair PGY2  23yo  POD#5 sp pLTCS (10/28) @34wks di/di TIUP c/b sPEC sp Mg, now presents to ED with elevated BPs. BPs initially 150/98, most recently 110-120s/70-80s.    Recommendations:  - Monitor vital signs [id number]  - F/u HELLP labs (CBC/CMP/lactate dehydrogenase/uric acid) - ALT slightly elevated at 51, however all other labs wnl and not yet meeting criteria for exacerbation of sPEC  - Pt asymptomatic   - Pt received Pro30, BPs wnl  - Continue to monitor    Doc Nair PGY2    ADDENDUM 2024 @ 850a  - BPs wnl  - Pt asymptomatic  - HELLP wnl  - No acute GYN interventions at this time. Will email clinic for BP check appointment for this week  - Continue BP monitoring TID. Recommended taking BP cuff to pharmacy to make sure appropriate size and making sure taking BP correctly. Continue Pro30 daily. Discussed signs/sx to monitor and return to hospital. Advised pt to return to hospital/contact office if BPs are >150/100.     d/w Dr. Blas PGY5  Doc Nair PGY2

## 2024-11-02 NOTE — ED PROVIDER NOTE - PATIENT PORTAL LINK FT
You can access the FollowMyHealth Patient Portal offered by [facility] by registering at the following website: [email or web address]. By joining [employer]’s FollowMyHealth portal, you will also be able to view your health information using other applications (apps) compatible with our system.

## 2024-11-02 NOTE — ED PROVIDER NOTE - CLINICAL SUMMARY MEDICAL DECISION MAKING FREE TEXT BOX
24 year old female complaining of hypertension. . Planned c section 10/28, discharged on  (yesterday) after being treated for preeclampsia after a month long admission. Return this AM with home /111. Will consult OB. Dispo pending BP and lab result. likely can be discharge home 24 year old female complaining of hypertension. . Planned c section 10/28, discharged on  (yesterday) after being treated for preeclampsia after a month long admission. Return this AM with home /111. Mild headache.  No other neuro symptoms.  No right upper quadrant tenderness.  Able to urinate. Will consult OB. Dispo pending BP and lab result. likely can be discharge home

## 2024-11-02 NOTE — CONSULT NOTE ADULT - SUBJECTIVE AND OBJECTIVE BOX
**incomplete note** **incomplete note**    RAYNE ALDRIDGE    24y    Female    [id number]    HPI:  25yo  POD#5 sp pLTCS @34wks di/di TIUP c/b sPEC sp Mg, now presents to ED with elevated BPs.       Name of Ob/Gyn Physician:  POB:   : pLTCS @34wks di/di TIUP c/b sPEC sp Mg,  PGYN: -fibroids, -ovarian cysts, denies STD hx, denies abnormal PAPs   PMH: Denies  PSH: c/s  SH: Denies EtOH, tobacco and illicit drug use during this pregnancy; feels safe at home   Meds: PNVs  Allergies: NKDA        Vital Signs Last 24 Hrs  T(C): 37 (2024 07:00), Max: 37.2 (2024 13:49)  T(F): 98.6 (2024 07:00), Max: 98.9 (2024 13:49)  HR: 98 (2024 07:17) (95 - 116)  BP: 125/80 (2024 07:17) (124/81 - 150/94)  BP(mean): 108 (2024 07:00) (108 - 108)  RR: 18 (2024 07:17) (18 - 20)  SpO2: 99% (2024 07:17) (95% - 99%)    Parameters below as of 2024 07:17  Patient On (Oxygen Delivery Method): room air        PHYSICAL EXAM:   Gen: NAD   Cardiovascular: Clinically well perfused  Respiratory: Breathing comfortably on RA  Abd: Soft, non-tender, non-distended  Extremities: Non-tender, non-edematous; able to move all ext equally  Neuro: AOx3      LABS:          PT/INR - ( 2024 07:15 )   PT: 10.0 sec;   INR: 0.88 ratio         PTT - ( 2024 07:15 )  PTT:27.2 sec      RADIOLOGY & ADDITIONAL STUDIES:   **incomplete note**    RAYNE ALDRIDGE    24y    Female    [id number]    HPI:  23yo  POD#5 sp pLTCS (10/28) @34wks di/di TIUP c/b sPEC sp Mg, now presents to ED with elevated BPs.       Name of Ob/Gyn Physician:  POB:   : pLTCS @34wks di/di TIUP c/b sPEC sp Mg,  PGYN: -fibroids, -ovarian cysts, denies STD hx, denies abnormal PAPs   PMH: Denies  PSH: c/s  SH: Denies EtOH, tobacco and illicit drug use during this pregnancy; feels safe at home   Meds: PNVs  Allergies: NKDA        Vital Signs Last 24 Hrs  T(C): 37 (2024 07:00), Max: 37.2 (2024 13:49)  T(F): 98.6 (2024 07:00), Max: 98.9 (2024 13:49)  HR: 98 (2024 07:17) (95 - 116)  BP: 125/80 (2024 07:17) (124/81 - 150/94)  BP(mean): 108 (2024 07:00) (108 - 108)  RR: 18 (2024 07:17) (18 - 20)  SpO2: 99% (2024 07:17) (95% - 99%)    Parameters below as of 2024 07:17  Patient On (Oxygen Delivery Method): room air        PHYSICAL EXAM:   Gen: NAD   Cardiovascular: Clinically well perfused  Respiratory: Breathing comfortably on RA  Abd: Soft, non-tender, non-distended  Extremities: Non-tender, non-edematous; able to move all ext equally  Neuro: AOx3      LABS:          PT/INR - ( 2024 07:15 )   PT: 10.0 sec;   INR: 0.88 ratio         PTT - ( 2024 07:15 )  PTT:27.2 sec      RADIOLOGY & ADDITIONAL STUDIES:   **incomplete note**    RAYNE ALDRIDGE    24y    Female    [id number]    HPI:  23yo  POD#5 sp pLTCS (10/28) @34wks di/di TIUP c/b sPEC sp Mg, now presents to ED with elevated BPs. Pt reports taking BP for first time this AM and it was 170s/100s after breastfeeding so came to hospital. Had 4/10 HA for 45 mins, now resolved.  Denies vision changes, CP/SOB, lightheadedness, dizziness, N/V.      Name of Ob/Gyn Physician:  POB:   : pLTCS @34wks di/di TIUP c/b sPEC sp Mg,  PGYN: -fibroids, -ovarian cysts, denies STD hx, denies abnormal PAPs   PMH: Denies  PSH: c/s  SH: Denies EtOH, tobacco and illicit drug use during this pregnancy; feels safe at home   Meds: PNVs  Allergies: NKDA        Vital Signs Last 24 Hrs  T(C): 37 (2024 07:00), Max: 37.2 (2024 13:49)  T(F): 98.6 (2024 07:00), Max: 98.9 (2024 13:49)  HR: 98 (2024 07:17) (95 - 116)  BP: 125/80 (2024 07:17) (124/81 - 150/94)  BP(mean): 108 (2024 07:00) (108 - 108)  RR: 18 (2024 07:17) (18 - 20)  SpO2: 99% (2024 07:17) (95% - 99%)    Parameters below as of 2024 07:17  Patient On (Oxygen Delivery Method): room air        PHYSICAL EXAM:   Gen: NAD   Cardiovascular: Clinically well perfused  Respiratory: Breathing comfortably on RA  Abd: Soft, non-tender, non-distended  Extremities: Non-tender, non-edematous; able to move all ext equally  Neuro: AOx3      LABS:          PT/INR - ( 2024 07:15 )   PT: 10.0 sec;   INR: 0.88 ratio         PTT - ( 2024 07:15 )  PTT:27.2 sec      RADIOLOGY & ADDITIONAL STUDIES:   RAYNE ALDRIDGE    24y    Female    [id number]    HPI:  25yo  POD#5 sp pLTCS (10/28) @34wks di/di TIUP c/b sPEC sp Mg, now presents to ED with elevated BPs. Pt reports taking BP for first time this AM and it was 170s/100s after breastfeeding so came to hospital. Had 4/10 HA for 45 mins, now resolved.  Denies vision changes, CP/SOB, lightheadedness, dizziness, N/V.      Name of Ob/Gyn Physician:  POB:   : pLTCS @34wks di/di TIUP c/b sPEC sp Mg,  PGYN: -fibroids, -ovarian cysts, denies STD hx, denies abnormal PAPs   PMH: Denies  PSH: c/s  SH: Denies EtOH, tobacco and illicit drug use during this pregnancy; feels safe at home   Meds: PNVs  Allergies: NKDA        Vital Signs Last 24 Hrs  T(C): 37 (2024 07:00), Max: 37.2 (2024 13:49)  T(F): 98.6 (2024 07:00), Max: 98.9 (2024 13:49)  HR: 98 (2024 07:17) (95 - 116)  BP: 125/80 (2024 07:17) (124/81 - 150/94)  BP(mean): 108 (2024 07:00) (108 - 108)  RR: 18 (2024 07:17) (18 - 20)  SpO2: 99% (2024 07:17) (95% - 99%)    Parameters below as of 2024 07:17  Patient On (Oxygen Delivery Method): room air        PHYSICAL EXAM:   Gen: NAD   Cardiovascular: Clinically well perfused  Respiratory: Breathing comfortably on RA  Abd: Soft, non-tender, non-distended  Extremities: Non-tender, non-edematous; able to move all ext equally  Neuro: AOx3      LABS:          PT/INR - ( 2024 07:15 )   PT: 10.0 sec;   INR: 0.88 ratio         PTT - ( 2024 07:15 )  PTT:27.2 sec      RADIOLOGY & ADDITIONAL STUDIES:

## 2024-11-04 ENCOUNTER — NON-APPOINTMENT (OUTPATIENT)
Age: 24
End: 2024-11-04

## 2024-11-04 PROBLEM — O14.90 UNSPECIFIED PRE-ECLAMPSIA, UNSPECIFIED TRIMESTER: Chronic | Status: ACTIVE | Noted: 2024-11-02

## 2024-11-05 ENCOUNTER — APPOINTMENT (OUTPATIENT)
Dept: ANTEPARTUM | Facility: CLINIC | Age: 24
End: 2024-11-05

## 2024-11-05 ENCOUNTER — LABORATORY RESULT (OUTPATIENT)
Age: 24
End: 2024-11-05

## 2024-11-05 ENCOUNTER — OUTPATIENT (OUTPATIENT)
Dept: OUTPATIENT SERVICES | Facility: HOSPITAL | Age: 24
LOS: 1 days | End: 2024-11-05
Payer: MEDICAID

## 2024-11-05 ENCOUNTER — APPOINTMENT (OUTPATIENT)
Dept: MATERNAL FETAL MEDICINE | Facility: CLINIC | Age: 24
End: 2024-11-05
Payer: COMMERCIAL

## 2024-11-05 VITALS
TEMPERATURE: 98.3 F | HEART RATE: 97 BPM | WEIGHT: 170.25 LBS | OXYGEN SATURATION: 99 % | DIASTOLIC BLOOD PRESSURE: 80 MMHG | SYSTOLIC BLOOD PRESSURE: 118 MMHG | HEIGHT: 65 IN | BODY MASS INDEX: 28.36 KG/M2

## 2024-11-05 DIAGNOSIS — O09.899 SUPERVISION OF OTHER HIGH RISK PREGNANCIES, UNSPECIFIED TRIMESTER: ICD-10-CM

## 2024-11-05 DIAGNOSIS — O30.041 TWIN PREGNANCY, DICHORIONIC/DIAMNIOTIC, FIRST TRIMESTER: ICD-10-CM

## 2024-11-05 DIAGNOSIS — O36.5992: ICD-10-CM

## 2024-11-05 DIAGNOSIS — Z98.891 HISTORY OF UTERINE SCAR FROM PREVIOUS SURGERY: Chronic | ICD-10-CM

## 2024-11-05 DIAGNOSIS — O14.13 SEVERE PRE-ECLAMPSIA, THIRD TRIMESTER: ICD-10-CM

## 2024-11-05 LAB
ALBUMIN SERPL ELPH-MCNC: 3.9 G/DL — SIGNIFICANT CHANGE UP (ref 3.3–5)
ALP SERPL-CCNC: 156 U/L — HIGH (ref 40–120)
ALT FLD-CCNC: 23 U/L — SIGNIFICANT CHANGE UP (ref 10–45)
ANION GAP SERPL CALC-SCNC: 12 MMOL/L — SIGNIFICANT CHANGE UP (ref 5–17)
AST SERPL-CCNC: 25 U/L — SIGNIFICANT CHANGE UP (ref 10–40)
BILIRUB SERPL-MCNC: 0.2 MG/DL — SIGNIFICANT CHANGE UP (ref 0.2–1.2)
BUN SERPL-MCNC: 15 MG/DL — SIGNIFICANT CHANGE UP (ref 7–23)
CALCIUM SERPL-MCNC: 9.6 MG/DL — SIGNIFICANT CHANGE UP (ref 8.4–10.5)
CHLORIDE SERPL-SCNC: 107 MMOL/L — SIGNIFICANT CHANGE UP (ref 96–108)
CO2 SERPL-SCNC: 22 MMOL/L — SIGNIFICANT CHANGE UP (ref 22–31)
CREAT SERPL-MCNC: 0.56 MG/DL — SIGNIFICANT CHANGE UP (ref 0.5–1.3)
EGFR: 129 ML/MIN/1.73M2 — SIGNIFICANT CHANGE UP
GLUCOSE SERPL-MCNC: 77 MG/DL — SIGNIFICANT CHANGE UP (ref 70–99)
HCT VFR BLD CALC: 37.4 % — SIGNIFICANT CHANGE UP (ref 34.5–45)
HGB BLD-MCNC: 11.1 G/DL — LOW (ref 11.5–15.5)
MCHC RBC-ENTMCNC: 26.9 PG — LOW (ref 27–34)
MCHC RBC-ENTMCNC: 29.7 G/DL — LOW (ref 32–36)
MCV RBC AUTO: 90.8 FL — SIGNIFICANT CHANGE UP (ref 80–100)
PLATELET # BLD AUTO: 369 K/UL — SIGNIFICANT CHANGE UP (ref 150–400)
POTASSIUM SERPL-MCNC: 4.3 MMOL/L — SIGNIFICANT CHANGE UP (ref 3.5–5.3)
POTASSIUM SERPL-SCNC: 4.3 MMOL/L — SIGNIFICANT CHANGE UP (ref 3.5–5.3)
PROT SERPL-MCNC: 6.6 G/DL — SIGNIFICANT CHANGE UP (ref 6–8.3)
RBC # BLD: 4.12 M/UL — SIGNIFICANT CHANGE UP (ref 3.8–5.2)
RBC # FLD: 14.4 % — SIGNIFICANT CHANGE UP (ref 10.3–14.5)
SODIUM SERPL-SCNC: 141 MMOL/L — SIGNIFICANT CHANGE UP (ref 135–145)
WBC # BLD: 9.95 K/UL — SIGNIFICANT CHANGE UP (ref 3.8–10.5)
WBC # FLD AUTO: 9.95 K/UL — SIGNIFICANT CHANGE UP (ref 3.8–10.5)

## 2024-11-05 PROCEDURE — G0463: CPT

## 2024-11-05 PROCEDURE — 80053 COMPREHEN METABOLIC PANEL: CPT

## 2024-11-05 PROCEDURE — 99213 OFFICE O/P EST LOW 20 MIN: CPT | Mod: GC

## 2024-11-05 PROCEDURE — 36415 COLL VENOUS BLD VENIPUNCTURE: CPT

## 2024-11-05 PROCEDURE — 85027 COMPLETE CBC AUTOMATED: CPT

## 2024-11-06 ENCOUNTER — NON-APPOINTMENT (OUTPATIENT)
Age: 24
End: 2024-11-06

## 2024-11-06 PROBLEM — O14.13 SEVERE PRE-ECLAMPSIA IN THIRD TRIMESTER: Status: ACTIVE | Noted: 2024-11-06

## 2024-11-08 ENCOUNTER — APPOINTMENT (OUTPATIENT)
Dept: ANTEPARTUM | Facility: CLINIC | Age: 24
End: 2024-11-08

## 2024-11-08 DIAGNOSIS — O30.041 TWIN PREGNANCY, DICHORIONIC/DIAMNIOTIC, FIRST TRIMESTER: ICD-10-CM

## 2024-11-08 DIAGNOSIS — O14.13 SEVERE PRE-ECLAMPSIA, THIRD TRIMESTER: ICD-10-CM

## 2024-11-08 DIAGNOSIS — O36.5992: ICD-10-CM

## 2024-11-12 ENCOUNTER — APPOINTMENT (OUTPATIENT)
Dept: ANTEPARTUM | Facility: CLINIC | Age: 24
End: 2024-11-12

## 2024-11-13 ENCOUNTER — APPOINTMENT (OUTPATIENT)
Dept: CARDIOLOGY | Facility: CLINIC | Age: 24
End: 2024-11-13
Payer: MEDICAID

## 2024-11-13 ENCOUNTER — TRANSCRIPTION ENCOUNTER (OUTPATIENT)
Age: 24
End: 2024-11-13

## 2024-11-13 VITALS — DIASTOLIC BLOOD PRESSURE: 80 MMHG | SYSTOLIC BLOOD PRESSURE: 112 MMHG

## 2024-11-13 VITALS
WEIGHT: 163 LBS | BODY MASS INDEX: 27.16 KG/M2 | HEART RATE: 96 BPM | DIASTOLIC BLOOD PRESSURE: 78 MMHG | SYSTOLIC BLOOD PRESSURE: 110 MMHG | OXYGEN SATURATION: 100 % | HEIGHT: 65 IN

## 2024-11-13 DIAGNOSIS — Z34.90 ENCOUNTER FOR SUPERVISION OF NORMAL PREGNANCY, UNSPECIFIED, UNSPECIFIED TRIMESTER: ICD-10-CM

## 2024-11-13 DIAGNOSIS — O14.90 UNSPECIFIED PRE-ECLAMPSIA, UNSPECIFIED TRIMESTER: ICD-10-CM

## 2024-11-13 PROCEDURE — 93000 ELECTROCARDIOGRAM COMPLETE: CPT

## 2024-11-13 PROCEDURE — 99213 OFFICE O/P EST LOW 20 MIN: CPT | Mod: 25

## 2024-11-13 RX ORDER — NIFEDIPINE 30 MG/1
30 TABLET, EXTENDED RELEASE ORAL DAILY
Qty: 90 | Refills: 1 | Status: ACTIVE | COMMUNITY
Start: 2024-11-13

## 2024-11-15 ENCOUNTER — APPOINTMENT (OUTPATIENT)
Dept: ANTEPARTUM | Facility: CLINIC | Age: 24
End: 2024-11-15

## 2024-11-19 ENCOUNTER — APPOINTMENT (OUTPATIENT)
Dept: ANTEPARTUM | Facility: CLINIC | Age: 24
End: 2024-11-19

## 2024-11-22 ENCOUNTER — APPOINTMENT (OUTPATIENT)
Dept: ANTEPARTUM | Facility: CLINIC | Age: 24
End: 2024-11-22

## 2024-11-26 ENCOUNTER — APPOINTMENT (OUTPATIENT)
Dept: ANTEPARTUM | Facility: CLINIC | Age: 24
End: 2024-11-26

## 2024-11-26 ENCOUNTER — APPOINTMENT (OUTPATIENT)
Dept: CARDIOLOGY | Facility: CLINIC | Age: 24
End: 2024-11-26
Payer: MEDICAID

## 2024-11-26 DIAGNOSIS — O14.90 UNSPECIFIED PRE-ECLAMPSIA, UNSPECIFIED TRIMESTER: ICD-10-CM

## 2024-11-26 PROCEDURE — 99441: CPT

## 2024-11-29 ENCOUNTER — NON-APPOINTMENT (OUTPATIENT)
Age: 24
End: 2024-11-29

## 2024-12-03 ENCOUNTER — APPOINTMENT (OUTPATIENT)
Dept: ANTEPARTUM | Facility: CLINIC | Age: 24
End: 2024-12-03

## 2024-12-06 ENCOUNTER — APPOINTMENT (OUTPATIENT)
Dept: ANTEPARTUM | Facility: CLINIC | Age: 24
End: 2024-12-06

## 2024-12-10 ENCOUNTER — NON-APPOINTMENT (OUTPATIENT)
Age: 24
End: 2024-12-10

## 2024-12-10 ENCOUNTER — APPOINTMENT (OUTPATIENT)
Dept: MATERNAL FETAL MEDICINE | Facility: CLINIC | Age: 24
End: 2024-12-10

## 2024-12-10 ENCOUNTER — APPOINTMENT (OUTPATIENT)
Dept: ANTEPARTUM | Facility: CLINIC | Age: 24
End: 2024-12-10

## 2024-12-11 ENCOUNTER — APPOINTMENT (OUTPATIENT)
Dept: OBGYN | Facility: CLINIC | Age: 24
End: 2024-12-11
Payer: MEDICAID

## 2024-12-11 VITALS
DIASTOLIC BLOOD PRESSURE: 60 MMHG | WEIGHT: 161 LBS | HEIGHT: 65 IN | BODY MASS INDEX: 26.82 KG/M2 | SYSTOLIC BLOOD PRESSURE: 122 MMHG

## 2024-12-11 DIAGNOSIS — Z11.3 ENCOUNTER FOR SCREENING FOR INFECTIONS WITH A PREDOMINANTLY SEXUAL MODE OF TRANSMISSION: ICD-10-CM

## 2024-12-11 DIAGNOSIS — N76.0 ACUTE VAGINITIS: ICD-10-CM

## 2024-12-11 PROCEDURE — 99213 OFFICE O/P EST LOW 20 MIN: CPT | Mod: 24

## 2024-12-11 PROCEDURE — 0503F POSTPARTUM CARE VISIT: CPT

## 2024-12-18 ENCOUNTER — APPOINTMENT (OUTPATIENT)
Dept: OBGYN | Facility: CLINIC | Age: 24
End: 2024-12-18

## 2024-12-18 ENCOUNTER — APPOINTMENT (OUTPATIENT)
Dept: ANTEPARTUM | Facility: CLINIC | Age: 24
End: 2024-12-18

## 2024-12-30 NOTE — DISCHARGE NOTE OB - DRINK 8 TO 10 GLASSES OF FLUID EACH DAY
Last seen: 08/06/2024  RTC: n/a  Cancel: 0  No-show: 0  Next appt: n/a  Last filled: 11/22/2024 (Qty 30, 30d)     Incoming refill from pharmacy via fax by pharmacy    Medication requested:   Pending Prescriptions:                       Disp   Refills    sertraline (ZOLOFT) 100 MG tablet         30 tab*1            Sig: Take 1 tablet (100 mg) by mouth daily.        From chart note:   Sertraline 100 mg daily     Refill sent to [facility] for final review.     Statement Selected

## 2025-01-08 NOTE — PROGRESS NOTE ADULT - ASSESSMENT
24y  at 32w0d GA by LMP admitted as a transfer from [facility] due to sPEC in the setting of severe FGR of baby B. Pt s/p Labetalol 20 IVP x1 at [facility] and has not required any more short acting antihypertensive medications during her inpatient hospital course. Patient was without complaints this morning     #Preeclampsia with severe features   - Tylenol and reglan PRN for headache. Patient asymptomatic this morning  - BP monitorin-137/67-84 overnight  - HELLP wnl (repeat q3d)  - s/p Mg x24h  - s/p Lab 20  - Continue Procardia 30XL daily    #Fetal wellbeing  -[facility](10/10): A: breech, anterior placenta, MVP 3.73, BPP 8/8, UAD wnl. B: transverse superior, back down maternal L, anterior placenta, MVP 3.31, BPP 8/8, UAD intermittently elevated  -[facility](10/7): A: breech, anterior placenta, MVP 5.23, BPP 8/8. B: back down head R, anterior placenta, MVP 3.75, BPP 8/8  -[facility] (10/4): A: breech, maternal R, anterior placenta, 1494g, MVP 4.19, BPP 8/8. B: transverse head mat R, anterior placenta, MVP 3.39, 888g (<1%, AC <1%, intermittently elevated UAD), BPP 8/8  -[facility](): A: beech inferior maternal R, anterior placenta, MVP 6, BPP 8/8, UAD wnl. Fetus B: breech superior maternal L, anterior placenta, MVP 6.3, BPP 8/8, UAD wnl  - NST BID  - PNV  - s/p BMZ (-)  - s/p NICU consult   - GBS: neg    #Maternal wellbeing  - T&S q3d  - regular diet   - HSQ for DVT prophylaxis    Edilberto Bolden, PGY-3  Obstetrics & Gynecology    What Is The Reason For Today's Visit?: History of Non-Melanoma Skin Cancer How Many Skin Cancers Have You Had?: more than one When Was Your Last Cancer Diagnosed?: 5/2024